# Patient Record
Sex: FEMALE | Race: WHITE | NOT HISPANIC OR LATINO | Employment: UNEMPLOYED | URBAN - METROPOLITAN AREA
[De-identification: names, ages, dates, MRNs, and addresses within clinical notes are randomized per-mention and may not be internally consistent; named-entity substitution may affect disease eponyms.]

---

## 2017-08-31 ENCOUNTER — HOSPITAL ENCOUNTER (OUTPATIENT)
Dept: FAMILY MEDICINE CLINIC | Facility: CLINIC | Age: 47
Setting detail: SPECIMEN
Discharge: HOME | End: 2017-08-31
Attending: FAMILY MEDICINE | Admitting: FAMILY MEDICINE

## 2017-08-31 LAB
ALBUMIN SERPL-MCNC: 3.5 G/DL (ref 3.5–4.8)
ALBUMIN/GLOB SERPL: 1.2 {RATIO} (ref 1–1.7)
ALP SERPL-CCNC: 50 IU/L (ref 32–91)
ALT SERPL-CCNC: 15 IU/L (ref 14–54)
ANION GAP SERPL CALC-SCNC: 17.3 MMOL/L (ref 10–20)
AST SERPL-CCNC: 23 IU/L (ref 15–41)
BASOPHILS # BLD AUTO: 0.1 10*3/UL (ref 0–0.2)
BASOPHILS NFR BLD AUTO: 0 % (ref 0–2)
BILIRUB SERPL-MCNC: 0.2 MG/DL (ref 0.3–1.2)
BUN SERPL-MCNC: 24 MG/DL (ref 8–20)
BUN/CREAT SERPL: 16 (ref 5.4–26.2)
CALCIUM SERPL-MCNC: 9.6 MG/DL (ref 8.9–10.3)
CHLORIDE SERPL-SCNC: 105 MMOL/L (ref 101–111)
CHOLEST SERPL-MCNC: 245 MG/DL
CHOLEST/HDLC SERPL: 6.6 {RATIO}
CONV CO2: 20 MMOL/L (ref 22–32)
CONV LDL CHOLESTEROL DIRECT: 148 MG/DL (ref 0–100)
CONV TOTAL PROTEIN: 6.5 G/DL (ref 6.1–7.9)
CREAT UR-MCNC: 1.5 MG/DL (ref 0.4–1)
DIFFERENTIAL METHOD BLD: (no result)
EOSINOPHIL # BLD AUTO: 0 % (ref 0–3)
EOSINOPHIL # BLD AUTO: 0 10*3/UL (ref 0–0.3)
ERYTHROCYTE [DISTWIDTH] IN BLOOD BY AUTOMATED COUNT: 13 % (ref 11.5–14.5)
GLOBULIN UR ELPH-MCNC: 3 G/DL (ref 2.5–3.8)
GLUCOSE SERPL-MCNC: 154 MG/DL (ref 65–99)
HCT VFR BLD AUTO: 35.6 % (ref 35–49)
HDLC SERPL-MCNC: 37 MG/DL
HGB BLD-MCNC: 11.5 G/DL (ref 12–15)
LDLC/HDLC SERPL: 4 {RATIO}
LIPID INTERPRETATION: ABNORMAL
LYMPHOCYTES # BLD AUTO: 2.4 10*3/UL (ref 0.8–4.8)
LYMPHOCYTES NFR BLD AUTO: 14 % (ref 18–42)
MCH RBC QN AUTO: 27.9 PG (ref 26–32)
MCHC RBC AUTO-ENTMCNC: 32.2 G/DL (ref 32–36)
MCV RBC AUTO: 86.6 FL (ref 80–94)
MONOCYTES # BLD AUTO: 0.9 10*3/UL (ref 0.1–1.3)
MONOCYTES NFR BLD AUTO: 5 % (ref 2–11)
NEUTROPHILS # BLD AUTO: 14.3 10*3/UL (ref 2.3–8.6)
NEUTROPHILS NFR BLD AUTO: 81 % (ref 50–75)
NRBC BLD AUTO-RTO: 0 /100{WBCS}
NRBC/RBC NFR BLD MANUAL: 0 10*3/UL
PLATELET # BLD AUTO: 446 10*3/UL (ref 150–450)
PMV BLD AUTO: 8.1 FL (ref 7.4–10.4)
POTASSIUM SERPL-SCNC: 4.3 MMOL/L (ref 3.6–5.1)
RBC # BLD AUTO: 4.11 10*6/UL (ref 4–5.4)
SODIUM SERPL-SCNC: 138 MMOL/L (ref 136–144)
TRIGL SERPL-MCNC: 439 MG/DL
VLDLC SERPL CALC-MCNC: 59.8 MG/DL
WBC # BLD AUTO: 17.6 10*3/UL (ref 4.5–11.5)

## 2018-01-15 ENCOUNTER — HOSPITAL ENCOUNTER (OUTPATIENT)
Dept: FAMILY MEDICINE CLINIC | Facility: CLINIC | Age: 48
Setting detail: SPECIMEN
Discharge: HOME | End: 2018-01-15
Attending: FAMILY MEDICINE | Admitting: FAMILY MEDICINE

## 2018-01-15 LAB
ALBUMIN SERPL-MCNC: 3.5 G/DL (ref 3.5–4.8)
ALBUMIN/GLOB SERPL: 1.2 {RATIO} (ref 1–1.7)
ALP SERPL-CCNC: 72 IU/L (ref 32–91)
ALT SERPL-CCNC: 18 IU/L (ref 14–54)
ANION GAP SERPL CALC-SCNC: 14.9 MMOL/L (ref 10–20)
AST SERPL-CCNC: 22 IU/L (ref 15–41)
BASOPHILS # BLD AUTO: 0.1 10*3/UL (ref 0–0.2)
BASOPHILS NFR BLD AUTO: 1 % (ref 0–2)
BILIRUB SERPL-MCNC: 0.3 MG/DL (ref 0.3–1.2)
BUN SERPL-MCNC: 17 MG/DL (ref 8–20)
BUN/CREAT SERPL: 13.1 (ref 5.4–26.2)
CALCIUM SERPL-MCNC: 9.4 MG/DL (ref 8.9–10.3)
CHLORIDE SERPL-SCNC: 103 MMOL/L (ref 101–111)
CHOLEST SERPL-MCNC: 143 MG/DL
CHOLEST/HDLC SERPL: 4.2 {RATIO}
CONV CO2: 25 MMOL/L (ref 22–32)
CONV LDL CHOLESTEROL DIRECT: 78 MG/DL (ref 0–100)
CONV TOTAL PROTEIN: 6.4 G/DL (ref 6.1–7.9)
CREAT UR-MCNC: 1.3 MG/DL (ref 0.4–1)
DIFFERENTIAL METHOD BLD: (no result)
EOSINOPHIL # BLD AUTO: 0.3 10*3/UL (ref 0–0.3)
EOSINOPHIL # BLD AUTO: 2 % (ref 0–3)
ERYTHROCYTE [DISTWIDTH] IN BLOOD BY AUTOMATED COUNT: 14.5 % (ref 11.5–14.5)
GLOBULIN UR ELPH-MCNC: 2.9 G/DL (ref 2.5–3.8)
GLUCOSE SERPL-MCNC: 155 MG/DL (ref 65–99)
HCT VFR BLD AUTO: 38.7 % (ref 35–49)
HDLC SERPL-MCNC: 34 MG/DL
HGB BLD-MCNC: 12.1 G/DL (ref 12–15)
LDLC/HDLC SERPL: 2.3 {RATIO}
LIPID INTERPRETATION: ABNORMAL
LYMPHOCYTES # BLD AUTO: 2.7 10*3/UL (ref 0.8–4.8)
LYMPHOCYTES NFR BLD AUTO: 21 % (ref 18–42)
MCH RBC QN AUTO: 25.8 PG (ref 26–32)
MCHC RBC AUTO-ENTMCNC: 31.3 G/DL (ref 32–36)
MCV RBC AUTO: 82.3 FL (ref 80–94)
MONOCYTES # BLD AUTO: 0.6 10*3/UL (ref 0.1–1.3)
MONOCYTES NFR BLD AUTO: 5 % (ref 2–11)
NEUTROPHILS # BLD AUTO: 9.3 10*3/UL (ref 2.3–8.6)
NEUTROPHILS NFR BLD AUTO: 71 % (ref 50–75)
NRBC BLD AUTO-RTO: 0 /100{WBCS}
NRBC/RBC NFR BLD MANUAL: 0 10*3/UL
PLATELET # BLD AUTO: 426 10*3/UL (ref 150–450)
PMV BLD AUTO: 9.1 FL (ref 7.4–10.4)
POTASSIUM SERPL-SCNC: 3.9 MMOL/L (ref 3.6–5.1)
RBC # BLD AUTO: 4.71 10*6/UL (ref 4–5.4)
SODIUM SERPL-SCNC: 139 MMOL/L (ref 136–144)
TRIGL SERPL-MCNC: 232 MG/DL
VLDLC SERPL CALC-MCNC: 31.1 MG/DL
WBC # BLD AUTO: 13 10*3/UL (ref 4.5–11.5)

## 2018-03-08 ENCOUNTER — HOSPITAL ENCOUNTER (OUTPATIENT)
Dept: FAMILY MEDICINE CLINIC | Facility: CLINIC | Age: 48
Setting detail: SPECIMEN
Discharge: HOME | End: 2018-03-08
Attending: FAMILY MEDICINE | Admitting: FAMILY MEDICINE

## 2018-05-02 ENCOUNTER — HOSPITAL ENCOUNTER (OUTPATIENT)
Dept: FAMILY MEDICINE CLINIC | Facility: CLINIC | Age: 48
Setting detail: SPECIMEN
Discharge: HOME | End: 2018-05-02
Attending: FAMILY MEDICINE | Admitting: FAMILY MEDICINE

## 2018-07-20 ENCOUNTER — HOSPITAL ENCOUNTER (OUTPATIENT)
Dept: OTHER | Facility: HOSPITAL | Age: 48
Setting detail: SPECIMEN
Discharge: HOME | End: 2018-07-20
Attending: NURSE PRACTITIONER | Admitting: NURSE PRACTITIONER

## 2018-07-20 LAB
ALBUMIN SERPL-MCNC: 3.4 G/DL (ref 3.5–4.8)
ALBUMIN/GLOB SERPL: 1 {RATIO} (ref 1–1.7)
ALP SERPL-CCNC: 72 IU/L (ref 32–91)
ALT SERPL-CCNC: 19 IU/L (ref 14–54)
ANION GAP SERPL CALC-SCNC: 15.5 MMOL/L (ref 10–20)
AST SERPL-CCNC: 30 IU/L (ref 15–41)
BASOPHILS # BLD AUTO: 0.1 10*3/UL (ref 0–0.2)
BASOPHILS NFR BLD AUTO: 1 % (ref 0–2)
BILIRUB SERPL-MCNC: 0.3 MG/DL (ref 0.3–1.2)
BUN SERPL-MCNC: 15 MG/DL (ref 8–20)
BUN/CREAT SERPL: 13.6 (ref 5.4–26.2)
CALCIUM SERPL-MCNC: 9.1 MG/DL (ref 8.9–10.3)
CHLORIDE SERPL-SCNC: 102 MMOL/L (ref 101–111)
CHOLEST SERPL-MCNC: 138 MG/DL
CHOLEST/HDLC SERPL: 4.8 {RATIO}
CONV CO2: 22 MMOL/L (ref 22–32)
CONV LDL CHOLESTEROL DIRECT: 61 MG/DL (ref 0–100)
CONV TOTAL PROTEIN: 6.7 G/DL (ref 6.1–7.9)
CREAT UR-MCNC: 1.1 MG/DL (ref 0.4–1)
DIFFERENTIAL METHOD BLD: (no result)
EOSINOPHIL # BLD AUTO: 0.2 10*3/UL (ref 0–0.3)
EOSINOPHIL # BLD AUTO: 2 % (ref 0–3)
ERYTHROCYTE [DISTWIDTH] IN BLOOD BY AUTOMATED COUNT: 15.5 % (ref 11.5–14.5)
GLOBULIN UR ELPH-MCNC: 3.3 G/DL (ref 2.5–3.8)
GLUCOSE SERPL-MCNC: 162 MG/DL (ref 65–99)
HCT VFR BLD AUTO: 38.1 % (ref 35–49)
HDLC SERPL-MCNC: 29 MG/DL
HGB BLD-MCNC: 12.5 G/DL (ref 12–15)
IRON SATN MFR SERPL: 6 % (ref 15–50)
IRON SERPL-MCNC: 27 UG/DL (ref 28–170)
LDLC/HDLC SERPL: 2.1 {RATIO}
LIPID INTERPRETATION: ABNORMAL
LYMPHOCYTES # BLD AUTO: 2.9 10*3/UL (ref 0.8–4.8)
LYMPHOCYTES NFR BLD AUTO: 28 % (ref 18–42)
MCH RBC QN AUTO: 25.8 PG (ref 26–32)
MCHC RBC AUTO-ENTMCNC: 32.8 G/DL (ref 32–36)
MCV RBC AUTO: 78.8 FL (ref 80–94)
MONOCYTES # BLD AUTO: 0.5 10*3/UL (ref 0.1–1.3)
MONOCYTES NFR BLD AUTO: 5 % (ref 2–11)
NEUTROPHILS # BLD AUTO: 6.8 10*3/UL (ref 2.3–8.6)
NEUTROPHILS NFR BLD AUTO: 64 % (ref 50–75)
NRBC BLD AUTO-RTO: 0 /100{WBCS}
NRBC/RBC NFR BLD MANUAL: 0 10*3/UL
PLATELET # BLD AUTO: 483 10*3/UL (ref 150–450)
PMV BLD AUTO: 8.6 FL (ref 7.4–10.4)
POTASSIUM SERPL-SCNC: 3.5 MMOL/L (ref 3.6–5.1)
RBC # BLD AUTO: 4.83 10*6/UL (ref 4–5.4)
SODIUM SERPL-SCNC: 136 MMOL/L (ref 136–144)
TIBC SERPL-MCNC: 457 UG/DL (ref 228–428)
TRIGL SERPL-MCNC: 442 MG/DL
VLDLC SERPL CALC-MCNC: 48.1 MG/DL
WBC # BLD AUTO: 10.5 10*3/UL (ref 4.5–11.5)

## 2019-06-19 RX ORDER — METFORMIN HYDROCHLORIDE 500 MG/1
TABLET, EXTENDED RELEASE ORAL
Qty: 60 TABLET | Refills: 0 | Status: SHIPPED | OUTPATIENT
Start: 2019-06-19 | End: 2019-09-07 | Stop reason: SDUPTHER

## 2019-06-19 RX ORDER — HYDROCHLOROTHIAZIDE 25 MG/1
TABLET ORAL
Qty: 30 TABLET | Refills: 1 | Status: SHIPPED | OUTPATIENT
Start: 2019-06-19 | End: 2019-08-23 | Stop reason: SDUPTHER

## 2019-06-20 RX ORDER — HYDRALAZINE HYDROCHLORIDE 25 MG/1
TABLET, FILM COATED ORAL
Qty: 60 TABLET | Refills: 0 | Status: SHIPPED | OUTPATIENT
Start: 2019-06-20 | End: 2019-07-21 | Stop reason: SDUPTHER

## 2019-07-12 RX ORDER — METOPROLOL TARTRATE 50 MG/1
TABLET, FILM COATED ORAL
Qty: 60 TABLET | Refills: 1 | Status: SHIPPED | OUTPATIENT
Start: 2019-07-12 | End: 2019-09-13 | Stop reason: SDUPTHER

## 2019-07-22 RX ORDER — HYDRALAZINE HYDROCHLORIDE 25 MG/1
TABLET, FILM COATED ORAL
Qty: 60 TABLET | Refills: 0 | Status: SHIPPED | OUTPATIENT
Start: 2019-07-22 | End: 2019-08-23 | Stop reason: SDUPTHER

## 2019-08-19 RX ORDER — HYDRALAZINE HYDROCHLORIDE 25 MG/1
TABLET, FILM COATED ORAL
Qty: 60 TABLET | Refills: 0 | OUTPATIENT
Start: 2019-08-19

## 2019-08-19 RX ORDER — HYDROCHLOROTHIAZIDE 25 MG/1
TABLET ORAL
Qty: 30 TABLET | Refills: 0 | OUTPATIENT
Start: 2019-08-19

## 2019-08-23 RX ORDER — HYDRALAZINE HYDROCHLORIDE 25 MG/1
TABLET, FILM COATED ORAL
Qty: 60 TABLET | Refills: 0 | Status: SHIPPED | OUTPATIENT
Start: 2019-08-23 | End: 2019-09-23 | Stop reason: SDUPTHER

## 2019-08-23 RX ORDER — HYDROCHLOROTHIAZIDE 25 MG/1
25 TABLET ORAL DAILY
Qty: 30 TABLET | Refills: 1 | Status: SHIPPED | OUTPATIENT
Start: 2019-08-23 | End: 2019-10-21 | Stop reason: SDUPTHER

## 2019-08-30 RX ORDER — LOSARTAN POTASSIUM 100 MG/1
TABLET ORAL
Qty: 90 TABLET | Refills: 0 | Status: SHIPPED | OUTPATIENT
Start: 2019-08-30 | End: 2019-11-27 | Stop reason: SDUPTHER

## 2019-09-04 ENCOUNTER — OFFICE VISIT (OUTPATIENT)
Dept: FAMILY MEDICINE CLINIC | Facility: CLINIC | Age: 49
End: 2019-09-04

## 2019-09-04 VITALS
WEIGHT: 243 LBS | BODY MASS INDEX: 39.05 KG/M2 | SYSTOLIC BLOOD PRESSURE: 119 MMHG | HEIGHT: 66 IN | HEART RATE: 84 BPM | OXYGEN SATURATION: 96 % | DIASTOLIC BLOOD PRESSURE: 80 MMHG

## 2019-09-04 DIAGNOSIS — E11.9 CONTROLLED TYPE 2 DIABETES MELLITUS WITHOUT COMPLICATION, WITHOUT LONG-TERM CURRENT USE OF INSULIN (HCC): ICD-10-CM

## 2019-09-04 DIAGNOSIS — F31.9 BIPOLAR AFFECTIVE DISORDER, REMISSION STATUS UNSPECIFIED (HCC): ICD-10-CM

## 2019-09-04 DIAGNOSIS — Z00.00 PREVENTATIVE HEALTH CARE: ICD-10-CM

## 2019-09-04 DIAGNOSIS — I10 ESSENTIAL HYPERTENSION: Primary | ICD-10-CM

## 2019-09-04 DIAGNOSIS — Z12.11 SCREENING FOR MALIGNANT NEOPLASM OF COLON: ICD-10-CM

## 2019-09-04 DIAGNOSIS — D50.9 IRON DEFICIENCY ANEMIA, UNSPECIFIED IRON DEFICIENCY ANEMIA TYPE: ICD-10-CM

## 2019-09-04 DIAGNOSIS — E53.8 VITAMIN B12 DEFICIENCY: ICD-10-CM

## 2019-09-04 DIAGNOSIS — N17.9 ACUTE RENAL FAILURE, UNSPECIFIED ACUTE RENAL FAILURE TYPE (HCC): ICD-10-CM

## 2019-09-04 DIAGNOSIS — R53.83 FATIGUE, UNSPECIFIED TYPE: ICD-10-CM

## 2019-09-04 DIAGNOSIS — N92.0 MENORRHAGIA WITH REGULAR CYCLE: ICD-10-CM

## 2019-09-04 PROBLEM — R01.1 HEART MURMUR: Status: ACTIVE | Noted: 2018-07-20

## 2019-09-04 PROBLEM — F41.9 ANXIETY DISORDER: Status: ACTIVE | Noted: 2019-09-04

## 2019-09-04 PROBLEM — N18.30 CHRONIC RENAL INSUFFICIENCY, STAGE III (MODERATE): Status: ACTIVE | Noted: 2018-07-20

## 2019-09-04 PROBLEM — Z30.9 CONTRACEPTION MANAGEMENT: Status: ACTIVE | Noted: 2018-12-05

## 2019-09-04 PROBLEM — R94.31 ABNORMAL EKG: Status: ACTIVE | Noted: 2018-12-05

## 2019-09-04 PROCEDURE — 99213 OFFICE O/P EST LOW 20 MIN: CPT | Performed by: NURSE PRACTITIONER

## 2019-09-04 RX ORDER — CHLORAL HYDRATE 500 MG
CAPSULE ORAL
COMMUNITY
Start: 2018-07-23 | End: 2021-08-17 | Stop reason: SINTOL

## 2019-09-04 RX ORDER — LAMOTRIGINE 100 MG/1
100 TABLET ORAL DAILY
COMMUNITY
Start: 2013-12-15 | End: 2021-05-17 | Stop reason: SDUPTHER

## 2019-09-04 RX ORDER — OMEPRAZOLE 20 MG/1
CAPSULE, DELAYED RELEASE ORAL
COMMUNITY
Start: 2017-08-30 | End: 2020-03-16 | Stop reason: SDUPTHER

## 2019-09-04 RX ORDER — ATORVASTATIN CALCIUM 40 MG/1
TABLET, FILM COATED ORAL EVERY 24 HOURS
COMMUNITY
Start: 2018-04-13 | End: 2020-03-16 | Stop reason: SDUPTHER

## 2019-09-04 RX ORDER — MONTELUKAST SODIUM 10 MG/1
TABLET ORAL EVERY 24 HOURS
COMMUNITY
Start: 2018-01-15 | End: 2019-12-06 | Stop reason: SDUPTHER

## 2019-09-04 RX ORDER — DULOXETIN HYDROCHLORIDE 60 MG/1
1 CAPSULE, DELAYED RELEASE ORAL EVERY 24 HOURS
COMMUNITY
Start: 2019-03-06 | End: 2019-11-27 | Stop reason: SDUPTHER

## 2019-09-04 RX ORDER — HYDROXYZINE HYDROCHLORIDE 25 MG/1
25 TABLET, FILM COATED ORAL DAILY PRN
COMMUNITY
Start: 2019-03-06 | End: 2020-04-16 | Stop reason: SDUPTHER

## 2019-09-04 NOTE — PROGRESS NOTES
Santosh Siegel is a 48 y.o. female.     Patient presents today for follow up. She continues to be without insurance. Patient is treated for hypertension with stable BP. She is treated for diabetes mellitus II and reports blood sugars are in good range.  Patient does have hx of CKD and is avoiding NSAIDs.  She is treated for GERD which is stable. Patient is also treated for hyperlipidemia.  She is followed by psych for bipolar d/o and depression. Patient is requesting referral to OBGYN. 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  She is wishing to have ablation.  She was noted to have low iron and anemia.               The following portions of the patient's history were reviewed and updated as appropriate: allergies, current medications, past family history, past medical history, past social history, past surgical history and problem list.    Review of Systems   Constitutional: Positive for fatigue. Negative for activity change, chills, diaphoresis and fever.   HENT: Negative for congestion, ear pain, facial swelling, mouth sores, rhinorrhea, sinus pressure and sore throat.    Eyes: Negative for blurred vision, double vision, photophobia, pain and visual disturbance.   Respiratory: Negative for cough, choking, chest tightness, shortness of breath, wheezing and stridor.    Cardiovascular: Negative for chest pain, palpitations and leg swelling.   Gastrointestinal: Negative for abdominal distention, abdominal pain, anal bleeding, blood in stool, constipation, diarrhea, nausea, rectal pain, vomiting, GERD and indigestion.   Endocrine: Negative for polydipsia, polyphagia and polyuria.   Genitourinary: Positive for menstrual problem and vaginal bleeding. Negative for difficulty urinating, frequency, hematuria, urgency and urinary incontinence.   Musculoskeletal: Negative for arthralgias, back pain and neck pain.   Skin: Negative for rash  and skin lesions.   Neurological: Negative for dizziness, tremors, weakness, light-headedness and headache.   Psychiatric/Behavioral: Positive for depressed mood and stress. Negative for agitation, behavioral problems and suicidal ideas. The patient is nervous/anxious.        Objective   Physical Exam   Constitutional: She is oriented to person, place, and time. She appears well-developed and well-nourished. No distress.   HENT:   Head: Normocephalic and atraumatic.   Right Ear: External ear normal.   Left Ear: External ear normal.   Nose: Nose normal.   Mouth/Throat: Oropharynx is clear and moist. No oropharyngeal exudate.   Eyes: Conjunctivae and EOM are normal. Pupils are equal, round, and reactive to light. Right eye exhibits no discharge. Left eye exhibits no discharge. No scleral icterus.   Neck: Normal range of motion. Neck supple. No JVD present. Carotid bruit is not present. No tracheal deviation present. No thyromegaly present.   Cardiovascular: Normal rate, regular rhythm, normal heart sounds and intact distal pulses. Exam reveals no gallop and no friction rub.   No murmur heard.  Pulmonary/Chest: Breath sounds normal. No stridor. No respiratory distress. She has no wheezes. She has no rales. She exhibits no tenderness.   Abdominal: Soft. Bowel sounds are normal. She exhibits no distension. There is no tenderness.   Musculoskeletal: Normal range of motion. She exhibits no edema, tenderness or deformity.   Lymphadenopathy:     She has no cervical adenopathy.   Neurological: She is alert and oriented to person, place, and time. No sensory deficit. She exhibits normal muscle tone. Coordination normal.   Skin: Skin is warm and dry. Capillary refill takes less than 2 seconds. No rash noted. She is not diaphoretic.   Psychiatric: She has a normal mood and affect. Her behavior is normal. Judgment and thought content normal.         Assessment/Plan   Rosita was seen today for hypertension.    Diagnoses and all  orders for this visit:    Essential hypertension  -     Comprehensive Metabolic Panel; Future  - Stable    Iron deficiency anemia, unspecified iron deficiency anemia type  -     Iron Profile; Future  -     CBC & Differential; Future  - Likely secondary to heavy menses but I do recommend Colonoscopy and EGD.  She thinks she will get insurance soon so we will go ahead and order colonoscopy. Referral to OBGYN for ablation.    Controlled type 2 diabetes mellitus without complication, without long-term current use of insulin (CMS/[facility])  -     Lipid Panel; Future  -     Hemoglobin A1c; Future    Menorrhagia with regular cycle  -     Ambulatory Referral to Obstetrics / Gynecology    Fatigue, unspecified type  -     TSH; Future    Vitamin B12 deficiency  -     Vitamin B12; Future    Screening for malignant neoplasm of colon  -     Ambulatory Referral For Screening Colonoscopy    Acute renal failure, unspecified acute renal failure type (CMS/HCC)   -  Repeat kidney function  Bipolar affective disorder, remission status unspecified (CMS/HCC)   -  Followed by psych.  Preventative health care   -  Patient due for mammogram, she will wait for insurance. She expects it to be active this year.    -  Recommend annual flu shot.

## 2019-09-09 RX ORDER — METFORMIN HYDROCHLORIDE 500 MG/1
TABLET, EXTENDED RELEASE ORAL
Qty: 60 TABLET | Refills: 0 | Status: SHIPPED | OUTPATIENT
Start: 2019-09-09 | End: 2019-10-08 | Stop reason: SDUPTHER

## 2019-09-13 RX ORDER — METOPROLOL TARTRATE 50 MG/1
TABLET, FILM COATED ORAL
Qty: 60 TABLET | Refills: 0 | Status: SHIPPED | OUTPATIENT
Start: 2019-09-13 | End: 2019-10-12 | Stop reason: SDUPTHER

## 2019-09-23 RX ORDER — HYDRALAZINE HYDROCHLORIDE 25 MG/1
25 TABLET, FILM COATED ORAL 2 TIMES DAILY
Qty: 60 TABLET | Refills: 0 | Status: SHIPPED | OUTPATIENT
Start: 2019-09-23 | End: 2019-10-22 | Stop reason: SDUPTHER

## 2019-10-08 RX ORDER — METFORMIN HYDROCHLORIDE 500 MG/1
1000 TABLET, EXTENDED RELEASE ORAL EVERY MORNING
Qty: 60 TABLET | Refills: 0 | Status: SHIPPED | OUTPATIENT
Start: 2019-10-08 | End: 2019-11-06 | Stop reason: SDUPTHER

## 2019-10-14 RX ORDER — METOPROLOL TARTRATE 50 MG/1
TABLET, FILM COATED ORAL
Qty: 60 TABLET | Refills: 0 | Status: SHIPPED | OUTPATIENT
Start: 2019-10-14 | End: 2019-11-14 | Stop reason: SDUPTHER

## 2019-10-21 RX ORDER — HYDROCHLOROTHIAZIDE 25 MG/1
25 TABLET ORAL DAILY
Qty: 30 TABLET | Refills: 1 | Status: SHIPPED | OUTPATIENT
Start: 2019-10-21 | End: 2020-01-17 | Stop reason: SDUPTHER

## 2019-10-22 RX ORDER — HYDRALAZINE HYDROCHLORIDE 25 MG/1
TABLET, FILM COATED ORAL
Qty: 60 TABLET | Refills: 0 | Status: SHIPPED | OUTPATIENT
Start: 2019-10-22 | End: 2019-11-21 | Stop reason: SDUPTHER

## 2019-10-24 ENCOUNTER — TELEPHONE (OUTPATIENT)
Dept: FAMILY MEDICINE CLINIC | Facility: CLINIC | Age: 49
End: 2019-10-24

## 2019-10-24 RX ORDER — FLUCONAZOLE 150 MG/1
150 TABLET ORAL ONCE
Qty: 1 TABLET | Refills: 0 | Status: SHIPPED | OUTPATIENT
Start: 2019-10-24 | End: 2021-04-26 | Stop reason: SDUPTHER

## 2019-11-06 RX ORDER — METFORMIN HYDROCHLORIDE 500 MG/1
TABLET, EXTENDED RELEASE ORAL
Qty: 60 TABLET | Refills: 0 | Status: SHIPPED | OUTPATIENT
Start: 2019-11-06 | End: 2019-12-09 | Stop reason: SDUPTHER

## 2019-11-15 RX ORDER — METOPROLOL TARTRATE 50 MG/1
TABLET, FILM COATED ORAL
Qty: 60 TABLET | Refills: 2 | Status: SHIPPED | OUTPATIENT
Start: 2019-11-15 | End: 2019-12-16 | Stop reason: SDUPTHER

## 2019-11-21 RX ORDER — HYDRALAZINE HYDROCHLORIDE 25 MG/1
TABLET, FILM COATED ORAL
Qty: 60 TABLET | Refills: 1 | Status: SHIPPED | OUTPATIENT
Start: 2019-11-21 | End: 2020-02-07

## 2019-11-27 RX ORDER — LOSARTAN POTASSIUM 100 MG/1
100 TABLET ORAL DAILY
Qty: 90 TABLET | Refills: 0 | Status: SHIPPED | OUTPATIENT
Start: 2019-11-27 | End: 2019-12-02 | Stop reason: SDUPTHER

## 2019-11-27 RX ORDER — DULOXETIN HYDROCHLORIDE 60 MG/1
CAPSULE, DELAYED RELEASE ORAL
Qty: 90 CAPSULE | Refills: 0 | Status: SHIPPED | OUTPATIENT
Start: 2019-11-27 | End: 2020-03-02 | Stop reason: SDUPTHER

## 2019-12-02 RX ORDER — LOSARTAN POTASSIUM 100 MG/1
100 TABLET ORAL DAILY
Qty: 90 TABLET | Refills: 0 | Status: SHIPPED | OUTPATIENT
Start: 2019-12-02 | End: 2019-12-16 | Stop reason: SDUPTHER

## 2019-12-06 RX ORDER — MONTELUKAST SODIUM 10 MG/1
10 TABLET ORAL EVERY 24 HOURS
Qty: 90 TABLET | Refills: 0 | Status: SHIPPED | OUTPATIENT
Start: 2019-12-06 | End: 2020-03-12

## 2019-12-09 RX ORDER — METFORMIN HYDROCHLORIDE 500 MG/1
1000 TABLET, EXTENDED RELEASE ORAL EVERY MORNING
Qty: 60 TABLET | Refills: 2 | Status: SHIPPED | OUTPATIENT
Start: 2019-12-09 | End: 2019-12-09 | Stop reason: SDUPTHER

## 2019-12-09 RX ORDER — METFORMIN HYDROCHLORIDE 500 MG/1
1000 TABLET, EXTENDED RELEASE ORAL EVERY MORNING
Qty: 180 TABLET | Refills: 0 | Status: SHIPPED | OUTPATIENT
Start: 2019-12-09 | End: 2019-12-16 | Stop reason: SDUPTHER

## 2019-12-16 RX ORDER — METFORMIN HYDROCHLORIDE 500 MG/1
1000 TABLET, EXTENDED RELEASE ORAL EVERY MORNING
Qty: 180 TABLET | Refills: 0 | Status: SHIPPED | OUTPATIENT
Start: 2019-12-16 | End: 2020-03-12

## 2019-12-16 RX ORDER — LOSARTAN POTASSIUM 100 MG/1
100 TABLET ORAL DAILY
Qty: 90 TABLET | Refills: 0 | Status: SHIPPED | OUTPATIENT
Start: 2019-12-16 | End: 2020-03-12

## 2019-12-16 RX ORDER — METOPROLOL TARTRATE 50 MG/1
50 TABLET, FILM COATED ORAL 2 TIMES DAILY
Qty: 60 TABLET | Refills: 2 | Status: SHIPPED | OUTPATIENT
Start: 2019-12-16 | End: 2020-03-16 | Stop reason: SDUPTHER

## 2020-01-17 RX ORDER — HYDROCHLOROTHIAZIDE 25 MG/1
25 TABLET ORAL DAILY
Qty: 30 TABLET | Refills: 1 | Status: SHIPPED | OUTPATIENT
Start: 2020-01-17 | End: 2020-03-16

## 2020-02-07 RX ORDER — HYDRALAZINE HYDROCHLORIDE 25 MG/1
TABLET, FILM COATED ORAL
Qty: 60 TABLET | Refills: 0 | Status: SHIPPED | OUTPATIENT
Start: 2020-02-07 | End: 2020-03-09

## 2020-03-02 RX ORDER — DULOXETIN HYDROCHLORIDE 60 MG/1
60 CAPSULE, DELAYED RELEASE ORAL DAILY
Qty: 90 CAPSULE | Refills: 0 | Status: SHIPPED | OUTPATIENT
Start: 2020-03-02 | End: 2020-03-03 | Stop reason: SDUPTHER

## 2020-03-03 RX ORDER — DULOXETIN HYDROCHLORIDE 60 MG/1
60 CAPSULE, DELAYED RELEASE ORAL DAILY
Qty: 90 CAPSULE | Refills: 0 | Status: SHIPPED | OUTPATIENT
Start: 2020-03-03 | End: 2020-03-09 | Stop reason: SDUPTHER

## 2020-03-09 ENCOUNTER — TELEPHONE (OUTPATIENT)
Dept: FAMILY MEDICINE CLINIC | Facility: CLINIC | Age: 50
End: 2020-03-09

## 2020-03-09 RX ORDER — HYDRALAZINE HYDROCHLORIDE 25 MG/1
TABLET, FILM COATED ORAL
Qty: 60 TABLET | Refills: 0 | Status: SHIPPED | OUTPATIENT
Start: 2020-03-09 | End: 2020-03-16 | Stop reason: SDUPTHER

## 2020-03-09 RX ORDER — DULOXETIN HYDROCHLORIDE 60 MG/1
60 CAPSULE, DELAYED RELEASE ORAL DAILY
Qty: 90 CAPSULE | Refills: 0 | Status: SHIPPED | OUTPATIENT
Start: 2020-03-09 | End: 2020-03-11 | Stop reason: SDUPTHER

## 2020-03-09 NOTE — TELEPHONE ENCOUNTER
Needs Zimbolta refilled until she can get in next week.  Said pharmacy had been trying to contact us. bm

## 2020-03-11 RX ORDER — DULOXETIN HYDROCHLORIDE 60 MG/1
60 CAPSULE, DELAYED RELEASE ORAL DAILY
Qty: 90 CAPSULE | Refills: 0 | Status: SHIPPED | OUTPATIENT
Start: 2020-03-11 | End: 2020-03-16

## 2020-03-12 RX ORDER — METFORMIN HYDROCHLORIDE 500 MG/1
TABLET, EXTENDED RELEASE ORAL
Qty: 180 TABLET | Refills: 0 | Status: SHIPPED | OUTPATIENT
Start: 2020-03-12 | End: 2020-06-15 | Stop reason: SDUPTHER

## 2020-03-12 RX ORDER — LOSARTAN POTASSIUM 100 MG/1
100 TABLET ORAL DAILY
Qty: 90 TABLET | Refills: 0 | Status: SHIPPED | OUTPATIENT
Start: 2020-03-12 | End: 2020-06-16

## 2020-03-12 RX ORDER — MONTELUKAST SODIUM 10 MG/1
TABLET ORAL
Qty: 90 TABLET | Refills: 0 | Status: SHIPPED | OUTPATIENT
Start: 2020-03-12 | End: 2020-03-16

## 2020-03-16 ENCOUNTER — OFFICE VISIT (OUTPATIENT)
Dept: FAMILY MEDICINE CLINIC | Facility: CLINIC | Age: 50
End: 2020-03-16

## 2020-03-16 VITALS
WEIGHT: 234.6 LBS | BODY MASS INDEX: 36.82 KG/M2 | DIASTOLIC BLOOD PRESSURE: 90 MMHG | HEIGHT: 67 IN | SYSTOLIC BLOOD PRESSURE: 129 MMHG | OXYGEN SATURATION: 95 % | HEART RATE: 104 BPM

## 2020-03-16 DIAGNOSIS — F41.9 ANXIETY DISORDER, UNSPECIFIED TYPE: ICD-10-CM

## 2020-03-16 DIAGNOSIS — E11.9 CONTROLLED TYPE 2 DIABETES MELLITUS WITHOUT COMPLICATION, WITHOUT LONG-TERM CURRENT USE OF INSULIN (HCC): Primary | ICD-10-CM

## 2020-03-16 DIAGNOSIS — I10 ESSENTIAL HYPERTENSION: ICD-10-CM

## 2020-03-16 DIAGNOSIS — J30.89 SEASONAL ALLERGIC RHINITIS DUE TO OTHER ALLERGIC TRIGGER: ICD-10-CM

## 2020-03-16 DIAGNOSIS — M26.649 TMJ ARTHRITIS: ICD-10-CM

## 2020-03-16 PROBLEM — F32.A DEPRESSION: Status: ACTIVE | Noted: 2020-03-16

## 2020-03-16 PROBLEM — J30.9 ALLERGIC RHINITIS DUE TO ALLERGEN: Status: ACTIVE | Noted: 2020-03-16

## 2020-03-16 PROCEDURE — 99212 OFFICE O/P EST SF 10 MIN: CPT | Performed by: NURSE PRACTITIONER

## 2020-03-16 RX ORDER — DESVENLAFAXINE 25 MG/1
25 TABLET, EXTENDED RELEASE ORAL DAILY
COMMUNITY
End: 2020-10-08 | Stop reason: SDUPTHER

## 2020-03-16 RX ORDER — HYDRALAZINE HYDROCHLORIDE 25 MG/1
25 TABLET, FILM COATED ORAL 2 TIMES DAILY
Qty: 180 TABLET | Refills: 1 | Status: SHIPPED | OUTPATIENT
Start: 2020-03-16 | End: 2020-10-08 | Stop reason: SDUPTHER

## 2020-03-16 RX ORDER — METOPROLOL TARTRATE 50 MG/1
50 TABLET, FILM COATED ORAL 2 TIMES DAILY
Qty: 180 TABLET | Refills: 1 | Status: SHIPPED | OUTPATIENT
Start: 2020-03-16 | End: 2020-10-08 | Stop reason: SDUPTHER

## 2020-03-16 RX ORDER — HYDROCHLOROTHIAZIDE 25 MG/1
TABLET ORAL
Qty: 30 TABLET | Refills: 0 | Status: SHIPPED | OUTPATIENT
Start: 2020-03-16 | End: 2020-03-16 | Stop reason: SDUPTHER

## 2020-03-16 RX ORDER — HYDROCHLOROTHIAZIDE 25 MG/1
25 TABLET ORAL DAILY
Qty: 90 TABLET | Refills: 1 | Status: SHIPPED | OUTPATIENT
Start: 2020-03-16 | End: 2020-10-08 | Stop reason: SDUPTHER

## 2020-03-16 RX ORDER — OMEPRAZOLE 20 MG/1
20 CAPSULE, DELAYED RELEASE ORAL DAILY
Qty: 90 CAPSULE | Refills: 1 | Status: SHIPPED | OUTPATIENT
Start: 2020-03-16 | End: 2020-09-15

## 2020-03-16 RX ORDER — ATORVASTATIN CALCIUM 40 MG/1
40 TABLET, FILM COATED ORAL NIGHTLY
Qty: 90 TABLET | Refills: 1 | Status: SHIPPED | OUTPATIENT
Start: 2020-03-16 | End: 2020-09-15

## 2020-03-16 NOTE — PROGRESS NOTES
Chief Complaint   Patient presents with   • Establish Care       HPI     HTN, stable on meds and takes as directed, denies chest pain, headache, shortness of air, palpitations and swelling of extremities.     Hyperlipidemia, The patient denies muscle aches, constipation, diarrhea, GI upset and fatigue.  Patient denies  chest pain/pressure, exercise intolerance, dyspnea, palpitations, syncope and pedal edema.      Diabetes mellitus type II, feels stable on meds, denies any signs/symptoms of hyper/hypoglycemia, blurry vision, polydipsia, polyuria, nocturia, and unintentional weight loss. BG <150 and checks 1-2 times/week.  Patient does not have insurance currently and last A1c in 2018 6.8    Depression/Anxiety, pt on multi meds, follows with Carmina Arora, psych. she reports she had a mental breakdown, quit her job, stopped duloxetine, now on pristiq. patient denies significant weight loss/gain, insomnia, hypersomnia, psychomotor agitation, psychomotor retardation, fatigue (loss of energy), feelings of worthlessness (guilt), impaired concentration (indecisiveness), thoughts of death or suicide.       Allergic rhinitis, stopped singulair d/t strong potential for depression exacerbation.     Past Medical History:   Diagnosis Date   • Allergies    • Depression    • Diabetes mellitus (CMS/[facility])    • GERD (gastroesophageal reflux disease)    • Hyperlipidemia    • Hypertension    • Pneumonia      Past Surgical History:   Procedure Laterality Date   • COLONOSCOPY       Family History   Problem Relation Age of Onset   • Heart failure Mother    • Cancer Father    • Heart failure Father    • Ovarian cancer Maternal Grandmother      Social History     Tobacco Use   • Smoking status: Never Smoker   • Smokeless tobacco: Never Used   Substance Use Topics   • Alcohol use: No     Frequency: Never         Current Outpatient Medications:   •  albuterol sulfate  (90 Base) MCG/ACT inhaler, Inhale 2 puffs Every 4 (Four) Hours As  Needed for Wheezing or Shortness of Air., Disp: 1 inhaler, Rfl: 0  •  Ascorbic Acid (VITAMIN C PO), Take  by mouth., Disp: , Rfl:   •  atorvastatin (LIPITOR) 40 MG tablet, Take 1 tablet by mouth Every Night., Disp: 90 tablet, Rfl: 1  •  Cetirizine HCl (ZYRTEC ALLERGY) 10 MG capsule, ZYRTEC ALLERGY 10 MG CAPS, Disp: , Rfl:   •  Desvenlafaxine Succinate ER (PRISTIQ) 25 MG tablet sustained-release 24 hour, Take 25 mg by mouth Daily., Disp: , Rfl:   •  hydrALAZINE (APRESOLINE) 25 MG tablet, Take 1 tablet by mouth 2 (Two) Times a Day., Disp: 180 tablet, Rfl: 1  •  hydroCHLOROthiazide (HYDRODIURIL) 25 MG tablet, Take 1 tablet by mouth Daily., Disp: 90 tablet, Rfl: 1  •  hydrOXYzine (ATARAX) 25 MG tablet, Take 25 mg by mouth Daily As Needed for Anxiety., Disp: , Rfl:   •  lamoTRIgine (LAMICTAL) 100 MG tablet, Take 100 mg by mouth Daily., Disp: , Rfl:   •  losartan (COZAAR) 100 MG tablet, Take 1 tablet by mouth Daily., Disp: 90 tablet, Rfl: 0  •  MAGNESIUM PO, Take  by mouth., Disp: , Rfl:   •  metFORMIN ER (GLUCOPHAGE-XR) 500 MG 24 hr tablet, TAKE TWO TABLETS BY MOUTH EVERY MORNING, Disp: 180 tablet, Rfl: 0  •  metoprolol tartrate (LOPRESSOR) 50 MG tablet, Take 1 tablet by mouth 2 (Two) Times a Day., Disp: 180 tablet, Rfl: 1  •  Omega-3 Fatty Acids (FISH OIL) 1000 MG capsule capsule, FISH OIL 1000 MG CAPS, Disp: , Rfl:   •  omeprazole (PrilOSEC) 20 MG capsule, Take 1 capsule by mouth Daily., Disp: 90 capsule, Rfl: 1  •  POTASSIUM PO, Take  by mouth., Disp: , Rfl:         The following portions of the patient's history were reviewed and updated as appropriate: allergies, current medications, past family history, past medical history, past social history, past surgical history and problem list.    Review of Systems   Constitutional: Negative for chills, fatigue and fever.   HENT: Positive for ear pain ( Right ear pain and jaw, frequently grinds her teeth). Negative for dental problem, sinus pressure and sore throat.    Eyes:  "Negative for visual disturbance.   Respiratory: Negative for cough, shortness of breath and wheezing.    Gastrointestinal: Negative for abdominal pain, blood in stool, constipation, diarrhea, nausea, vomiting and GERD.   Genitourinary: Negative for difficulty urinating, frequency, urgency and urinary incontinence.   Musculoskeletal: Negative for arthralgias, back pain, gait problem, joint swelling, myalgias and neck pain.   Skin: Negative for dry skin, pallor and rash.   Neurological: Negative for dizziness, seizures, speech difficulty and weakness.   Hematological: Negative for adenopathy.   Psychiatric/Behavioral: Positive for sleep disturbance, depressed mood and stress. The patient is nervous/anxious.         Vitals:    03/16/20 1305   BP: 129/90   BP Location: Left arm   Patient Position: Sitting   Cuff Size: Large Adult   Pulse: 104   SpO2: 95%   Weight: 106 kg (234 lb 9.6 oz)   Height: 170.2 cm (67.01\")     Body mass index is 36.73 kg/m².     Physical Exam   Constitutional: She is oriented to person, place, and time. She appears well-developed and well-nourished. No distress.   HENT:   Normal TMs, OP erythema with cobblestoning present.  Crepitus at right TM joint opening of the jaw   Eyes: Pupils are equal, round, and reactive to light.   Neck: Normal range of motion. Neck supple. No JVD present. No thyromegaly present.   Cardiovascular: Normal rate, regular rhythm, normal heart sounds and intact distal pulses.   No murmur heard.  Pulmonary/Chest: Effort normal and breath sounds normal. No respiratory distress.   Abdominal: Soft. Bowel sounds are normal. She exhibits no distension. There is no tenderness.   Musculoskeletal: Normal range of motion. She exhibits no tenderness.   Neurological: She is alert and oriented to person, place, and time. No sensory deficit.   Skin: Skin is warm and dry. She is not diaphoretic.   Psychiatric: Her behavior is normal. Judgment and thought content normal. Her mood appears " anxious. She exhibits a depressed mood.   Nursing note and vitals reviewed.       No visits with results within 7 Day(s) from this visit.   Latest known visit with results is:   No results found for any previous visit.         Diagnoses and all orders for this visit:    1. Controlled type 2 diabetes mellitus without complication, without long-term current use of insulin (CMS/[facility]) (Primary)  -     Basic Metabolic Panel; Future  -     Hemoglobin A1c; Future  -     CBC (No Diff); Future  Patient checks blood glucose 1-2 times weekly at home and less than 150.  Continue metformin XR 1000 mg daily.  Patient given order for BMP CBC and hemoglobin A1c to have done at [facility] for cash price    2. Seasonal allergic rhinitis due to other allergic trigger  Stopped Singulair due to potential for depression exacerbation.  Recommend patient to get Xyzal and Flonase over-the-counter    3. TMJ arthritis  Okay for intermittent ibuprofen as needed, limit chewy/hard candy and foods  Teeth should not touch unless chewing    4. Anxiety disorder, unspecified type  Keep follow-ups with Carmina Kohler, continue medications    5. Essential hypertension  BP stable on medications, refill and plan to return to clinic in 6 months or earlier as needed    Other orders  -     hydroCHLOROthiazide (HYDRODIURIL) 25 MG tablet; Take 1 tablet by mouth Daily.  Dispense: 90 tablet; Refill: 1  -     metoprolol tartrate (LOPRESSOR) 50 MG tablet; Take 1 tablet by mouth 2 (Two) Times a Day.  Dispense: 180 tablet; Refill: 1  -     omeprazole (PrilOSEC) 20 MG capsule; Take 1 capsule by mouth Daily.  Dispense: 90 capsule; Refill: 1  -     hydrALAZINE (APRESOLINE) 25 MG tablet; Take 1 tablet by mouth 2 (Two) Times a Day.  Dispense: 180 tablet; Refill: 1  -     atorvastatin (LIPITOR) 40 MG tablet; Take 1 tablet by mouth Every Night.  Dispense: 90 tablet; Refill: 1       Unable to obtain mammogram due to no insurance at this time, will re-evaluate in the  future  Return to office in 6 months or earlier as needed

## 2020-03-17 ENCOUNTER — RESULTS ENCOUNTER (OUTPATIENT)
Dept: FAMILY MEDICINE CLINIC | Facility: CLINIC | Age: 50
End: 2020-03-17

## 2020-03-17 DIAGNOSIS — E11.9 CONTROLLED TYPE 2 DIABETES MELLITUS WITHOUT COMPLICATION, WITHOUT LONG-TERM CURRENT USE OF INSULIN (HCC): ICD-10-CM

## 2020-04-16 RX ORDER — HYDROXYZINE HYDROCHLORIDE 25 MG/1
25 TABLET, FILM COATED ORAL DAILY PRN
Qty: 90 TABLET | Refills: 0 | Status: SHIPPED | OUTPATIENT
Start: 2020-04-16 | End: 2020-06-01

## 2020-06-01 RX ORDER — HYDROXYZINE HYDROCHLORIDE 25 MG/1
TABLET, FILM COATED ORAL
Qty: 90 TABLET | Refills: 2 | Status: SHIPPED | OUTPATIENT
Start: 2020-06-01 | End: 2021-04-29 | Stop reason: SDUPTHER

## 2020-06-15 ENCOUNTER — OFFICE VISIT (OUTPATIENT)
Dept: FAMILY MEDICINE CLINIC | Facility: CLINIC | Age: 50
End: 2020-06-15

## 2020-06-15 VITALS
HEIGHT: 67 IN | HEART RATE: 68 BPM | TEMPERATURE: 97.3 F | BODY MASS INDEX: 34.75 KG/M2 | DIASTOLIC BLOOD PRESSURE: 69 MMHG | SYSTOLIC BLOOD PRESSURE: 100 MMHG | WEIGHT: 221.4 LBS | OXYGEN SATURATION: 97 %

## 2020-06-15 DIAGNOSIS — I10 ESSENTIAL HYPERTENSION: ICD-10-CM

## 2020-06-15 DIAGNOSIS — E78.2 MIXED HYPERLIPIDEMIA: ICD-10-CM

## 2020-06-15 DIAGNOSIS — R53.83 FATIGUE, UNSPECIFIED TYPE: ICD-10-CM

## 2020-06-15 DIAGNOSIS — E11.65 CONTROLLED TYPE 2 DIABETES MELLITUS WITH HYPERGLYCEMIA, WITHOUT LONG-TERM CURRENT USE OF INSULIN (HCC): Primary | ICD-10-CM

## 2020-06-15 DIAGNOSIS — B37.31 CANDIDA VAGINITIS: ICD-10-CM

## 2020-06-15 PROCEDURE — 99212 OFFICE O/P EST SF 10 MIN: CPT | Performed by: NURSE PRACTITIONER

## 2020-06-15 RX ORDER — FLUCONAZOLE 150 MG/1
150 TABLET ORAL ONCE
Qty: 1 TABLET | Refills: 1 | Status: SHIPPED | OUTPATIENT
Start: 2020-06-15 | End: 2020-06-15

## 2020-06-15 RX ORDER — METFORMIN HYDROCHLORIDE 500 MG/1
1000 TABLET, EXTENDED RELEASE ORAL 2 TIMES DAILY
Qty: 360 TABLET | Refills: 1 | Status: SHIPPED | OUTPATIENT
Start: 2020-06-15 | End: 2020-08-06

## 2020-06-15 NOTE — PROGRESS NOTES
"Chief Complaint   Patient presents with   • Diabetes     pt says she is having issues with regulating BG   • Depression       HPI     Diabetes mellitus type II, on metformin mg XR BID, pt reports poor diet lately with increased depression, reports symptoms of hypoglycemia, including dizziness, fatigue, blurry vision, polydipsia, polyuria, she reports she checks her blood glucose and typically runs 110-130, however in the office glucose was checked today and unable to register glucometer reported \"hi\" she is self-pay, will not have insurance for another 80 days and has not been able to get cash pay lab work done at [facility] yet.  She denies nocturia, and unintentional weight loss.     HTN, patient on 50 mg twice daily of metoprolol, BP running low 100s systolic, patient reports severe fatigue, denies chest pain, headache, shortness of air, palpitations and swelling of extremities.     Hyperlipidemia, The patient denies muscle aches, constipation, diarrhea, GI upset, she does report severe fatigue, denies chest pain/pressure, exercise intolerance, dyspnea, palpitations, syncope and pedal edema.      Depression/anxiety, patient denies significant weight loss/gain, insomnia, hypersomnia, psychomotor agitation, psychomotor retardation, fatigue (loss of energy), feelings of worthlessness (guilt), impaired concentration (indecisiveness), thoughts of death or suicide.            The following portions of the patient's history were reviewed and updated as appropriate: allergies, current medications, past family history, past medical history, past social history, past surgical history and problem list.    Past Medical History:   Diagnosis Date   • Allergies    • Depression    • Diabetes mellitus (CMS/[facility])    • GERD (gastroesophageal reflux disease)    • Hyperlipidemia    • Hypertension    • Pneumonia      Past Surgical History:   Procedure Laterality Date   • COLONOSCOPY       Family History   Problem Relation Age of Onset   • " Heart failure Mother    • Cancer Father    • Heart failure Father    • Ovarian cancer Maternal Grandmother      Social History     Tobacco Use   • Smoking status: Never Smoker   • Smokeless tobacco: Never Used   Substance Use Topics   • Alcohol use: No     Frequency: Never         Current Outpatient Medications:   •  albuterol sulfate  (90 Base) MCG/ACT inhaler, Inhale 2 puffs Every 4 (Four) Hours As Needed for Wheezing or Shortness of Air., Disp: 1 inhaler, Rfl: 0  •  Ascorbic Acid (VITAMIN C PO), Take  by mouth., Disp: , Rfl:   •  atorvastatin (LIPITOR) 40 MG tablet, Take 1 tablet by mouth Every Night., Disp: 90 tablet, Rfl: 1  •  Cetirizine HCl (ZYRTEC ALLERGY) 10 MG capsule, ZYRTEC ALLERGY 10 MG CAPS, Disp: , Rfl:   •  Desvenlafaxine Succinate ER (PRISTIQ) 25 MG tablet sustained-release 24 hour, Take 25 mg by mouth Daily., Disp: , Rfl:   •  hydrALAZINE (APRESOLINE) 25 MG tablet, Take 1 tablet by mouth 2 (Two) Times a Day., Disp: 180 tablet, Rfl: 1  •  hydroCHLOROthiazide (HYDRODIURIL) 25 MG tablet, Take 1 tablet by mouth Daily., Disp: 90 tablet, Rfl: 1  •  hydrOXYzine (ATARAX) 25 MG tablet, TAKE ONE TABLET BY MOUTH DAILY AS NEEDED FOR ANXIETY, Disp: 90 tablet, Rfl: 2  •  lamoTRIgine (LAMICTAL) 100 MG tablet, Take 100 mg by mouth Daily., Disp: , Rfl:   •  losartan (COZAAR) 100 MG tablet, Take 1 tablet by mouth Daily., Disp: 90 tablet, Rfl: 0  •  MAGNESIUM PO, Take  by mouth., Disp: , Rfl:   •  metFORMIN ER (GLUCOPHAGE-XR) 500 MG 24 hr tablet, Take 2 tablets by mouth 2 (Two) Times a Day., Disp: 360 tablet, Rfl: 1  •  metoprolol tartrate (LOPRESSOR) 50 MG tablet, Take 1 tablet by mouth 2 (Two) Times a Day., Disp: 180 tablet, Rfl: 1  •  Omega-3 Fatty Acids (FISH OIL) 1000 MG capsule capsule, FISH OIL 1000 MG CAPS, Disp: , Rfl:   •  omeprazole (PrilOSEC) 20 MG capsule, Take 1 capsule by mouth Daily., Disp: 90 capsule, Rfl: 1  •  POTASSIUM PO, Take  by mouth., Disp: , Rfl:   •  fluconazole (Diflucan) 150 MG  "tablet, Take 1 tablet by mouth 1 (One) Time for 1 dose. May repeat in 72 hours prn, Disp: 1 tablet, Rfl: 1      Review of Systems       A full 12 point review of systems has been obtained as mentioned in HPI, otherwise negative      Vitals:    06/15/20 1209   BP: 100/69   BP Location: Left arm   Patient Position: Sitting   Cuff Size: Large Adult   Pulse: 68   Temp: 97.3 °F (36.3 °C)   TempSrc: Skin   SpO2: 97%   Weight: 100 kg (221 lb 6.4 oz)   Height: 170.2 cm (67.01\")     Body mass index is 34.67 kg/m².    Physical Exam   Constitutional: She is oriented to person, place, and time. She appears well-developed and well-nourished. No distress.   Eyes: Pupils are equal, round, and reactive to light.   Neck: Normal range of motion. Neck supple. No JVD present. No thyromegaly present.   Cardiovascular: Normal rate, regular rhythm, normal heart sounds and intact distal pulses.   No murmur heard.  Pulmonary/Chest: Effort normal and breath sounds normal. No respiratory distress.   Abdominal: Soft. Bowel sounds are normal. She exhibits no distension. There is no tenderness.   Musculoskeletal: Normal range of motion. She exhibits no tenderness.   Neurological: She is alert and oriented to person, place, and time. No sensory deficit.   Skin: Skin is warm and dry. She is not diaphoretic.   Psychiatric: Her speech is normal and behavior is normal. Judgment and thought content normal. Her mood appears anxious. She exhibits a depressed mood.   Nursing note and vitals reviewed.      No visits with results within 7 Day(s) from this visit.   Latest known visit with results is:   No results found for any previous visit.       Diagnoses and all orders for this visit:    1. Controlled type 2 diabetes mellitus with hyperglycemia, without long-term current use of insulin (CMS/[facility]) (Primary)  -     metFORMIN ER (GLUCOPHAGE-XR) 500 MG 24 hr tablet; Take 2 tablets by mouth 2 (Two) Times a Day.  Dispense: 360 tablet; Refill: 1  -Patient " "instructed to take orders to [facility] ASAP, need to see hemoglobin A1c -glucose is likely very high  -Glucometer reading \"high\" in office today  -Metformin XR increased to 1000 mg twice daily  -We will add other medications if needed pending hemoglobin A1c  -Patient also instructed to make an eye appointment    2. Fatigue, unspecified type  -     TSH; Future    3. Essential hypertension  -Patient instructed to cut metoprolol back to 25 mg twice daily  -Continue hydrochlorothiazide and losartan    4. Mixed hyperlipidemia  -     Lipid Panel; Future  -Patient had yogurt approximately 5 hours ago, she is going to have labs drawn at [facility] today  -Will notify patient results once received    5. Candida vaginitis  -     fluconazole (Diflucan) 150 MG tablet; Take 1 tablet by mouth 1 (One) Time for 1 dose. May repeat in 72 hours prn  Dispense: 1 tablet; Refill: 1  Candida likely related to elevated glucose    -Patient currently does not have health insurance  -We will adjust medicines via telephone and plan to see patient in the office again in 3 months or earlier if needed    "

## 2020-06-16 ENCOUNTER — RESULTS ENCOUNTER (OUTPATIENT)
Dept: FAMILY MEDICINE CLINIC | Facility: CLINIC | Age: 50
End: 2020-06-16

## 2020-06-16 DIAGNOSIS — R53.83 FATIGUE, UNSPECIFIED TYPE: ICD-10-CM

## 2020-06-16 DIAGNOSIS — E78.2 MIXED HYPERLIPIDEMIA: ICD-10-CM

## 2020-06-16 RX ORDER — LOSARTAN POTASSIUM 100 MG/1
TABLET ORAL
Qty: 90 TABLET | Refills: 0 | Status: SHIPPED | OUTPATIENT
Start: 2020-06-16 | End: 2020-09-15

## 2020-06-18 ENCOUNTER — TELEPHONE (OUTPATIENT)
Dept: FAMILY MEDICINE CLINIC | Facility: CLINIC | Age: 50
End: 2020-06-18

## 2020-06-18 LAB
BUN SERPL-MCNC: 29 MG/DL (ref 6–24)
BUN/CREAT SERPL: 15 (ref 9–23)
CALCIUM SERPL-MCNC: 10.1 MG/DL (ref 8.7–10.2)
CHLORIDE SERPL-SCNC: 84 MMOL/L (ref 96–106)
CHOLEST SERPL-MCNC: 129 MG/DL (ref 100–199)
CO2 SERPL-SCNC: 23 MMOL/L (ref 20–29)
CREAT SERPL-MCNC: 1.99 MG/DL (ref 0.57–1)
ERYTHROCYTE [DISTWIDTH] IN BLOOD BY AUTOMATED COUNT: 13.6 % (ref 11.7–15.4)
GLUCOSE SERPL-MCNC: 701 MG/DL (ref 65–99)
HBA1C MFR BLD: 14.5 % (ref 4.8–5.6)
HCT VFR BLD AUTO: 41.7 % (ref 34–46.6)
HDLC SERPL-MCNC: 24 MG/DL
HGB BLD-MCNC: 12.7 G/DL (ref 11.1–15.9)
LDLC SERPL CALC-MCNC: ABNORMAL MG/DL (ref 0–99)
MCH RBC QN AUTO: 25.7 PG (ref 26.6–33)
MCHC RBC AUTO-ENTMCNC: 30.5 G/DL (ref 31.5–35.7)
MCV RBC AUTO: 84 FL (ref 79–97)
POTASSIUM SERPL-SCNC: 5.1 MMOL/L (ref 3.5–5.2)
RBC # BLD AUTO: 4.95 X10E6/UL (ref 3.77–5.28)
SODIUM SERPL-SCNC: 128 MMOL/L (ref 134–144)
TRIGL SERPL-MCNC: 654 MG/DL (ref 0–149)
TSH SERPL DL<=0.005 MIU/L-ACNC: 2.59 UIU/ML (ref 0.45–4.5)
VLDLC SERPL CALC-MCNC: ABNORMAL MG/DL (ref 5–40)
WBC # BLD AUTO: 10 X10E3/UL (ref 3.4–10.8)

## 2020-06-18 RX ORDER — PIOGLITAZONEHYDROCHLORIDE 30 MG/1
30 TABLET ORAL DAILY
Qty: 30 TABLET | Refills: 2 | Status: SHIPPED | OUTPATIENT
Start: 2020-06-18 | End: 2020-06-18 | Stop reason: SDUPTHER

## 2020-06-18 RX ORDER — PIOGLITAZONEHYDROCHLORIDE 30 MG/1
30 TABLET ORAL DAILY
Qty: 30 TABLET | Refills: 2 | Status: SHIPPED | OUTPATIENT
Start: 2020-06-18 | End: 2020-10-08 | Stop reason: SDUPTHER

## 2020-06-18 NOTE — TELEPHONE ENCOUNTER
Her hgba1c is very high at 14.5, trigs very high as well over 600, she has to make major diet changes, <30g carbs per meal, limiting breads/sugars/sodas/sweets, fatty, fried, greasy foods and increasing exercise habits. Kidney function slightly off and needs to drink more water but also r/t high glucose, She really needs insulin, but I know she doesn't have insurance. She needs to start actos, I am sending to pharmacy, is on [facility] $4 list. Needs to return for for labs as soon as she has insurance.

## 2020-06-23 ENCOUNTER — TELEPHONE (OUTPATIENT)
Dept: FAMILY MEDICINE CLINIC | Facility: CLINIC | Age: 50
End: 2020-06-23

## 2020-06-23 NOTE — TELEPHONE ENCOUNTER
Patient called to report that her blood sugar is dropping.  She said it was 300-400s on Sat and Monday morning it was 273.

## 2020-06-24 NOTE — TELEPHONE ENCOUNTER
Yes preferably if she has insurance and can get them prior to the appt. She'll need a diabetic panel.

## 2020-06-25 NOTE — TELEPHONE ENCOUNTER
When I spoke to patient, I did put her on the lab schedule.  I told her to assume to come in the week prior unless I tell her otherwise.

## 2020-06-29 ENCOUNTER — TELEPHONE (OUTPATIENT)
Dept: FAMILY MEDICINE CLINIC | Facility: CLINIC | Age: 50
End: 2020-06-29

## 2020-06-29 NOTE — TELEPHONE ENCOUNTER
Patient went below 200s this weekend.  Has been fluctuating 188-200s.  Last night she began having heart palpitations and sweating.  Her BG dropped below 117.  The lowest it got was 98.  She asked what kind of changes you would like to make.  She has been taking 20 units of insulin.

## 2020-06-29 NOTE — TELEPHONE ENCOUNTER
Pt called and I also put her thru to MA line to leave detailed message but she needs a call back today.  Her Blood sugar has dropped very low. She is concerned. bm

## 2020-06-29 NOTE — TELEPHONE ENCOUNTER
Those ranges are perfect, 98 will feel low right now until her body adjusts back to what normal glucose feels like, but really is in good range. If she is mostly 170-200 I don't want to change. If at nighttime before insulin she is <160 she can take 1/2 the dose, 10 units. Thanks. She should keep a small snack on hand for if she starts to feel low, peanut butter pretzels/crackers, or 4 oz of juice

## 2020-07-20 ENCOUNTER — TELEPHONE (OUTPATIENT)
Dept: FAMILY MEDICINE CLINIC | Facility: CLINIC | Age: 50
End: 2020-07-20

## 2020-08-06 DIAGNOSIS — E11.65 CONTROLLED TYPE 2 DIABETES MELLITUS WITH HYPERGLYCEMIA, WITHOUT LONG-TERM CURRENT USE OF INSULIN (HCC): ICD-10-CM

## 2020-08-06 RX ORDER — METFORMIN HYDROCHLORIDE 500 MG/1
1000 TABLET, EXTENDED RELEASE ORAL 2 TIMES DAILY
Qty: 120 TABLET | Refills: 5 | Status: SHIPPED | OUTPATIENT
Start: 2020-08-06 | End: 2020-10-08 | Stop reason: SDUPTHER

## 2020-09-15 RX ORDER — ATORVASTATIN CALCIUM 40 MG/1
TABLET, FILM COATED ORAL
Qty: 90 TABLET | Refills: 0 | Status: SHIPPED | OUTPATIENT
Start: 2020-09-15 | End: 2020-10-08 | Stop reason: SDUPTHER

## 2020-09-15 RX ORDER — LOSARTAN POTASSIUM 100 MG/1
TABLET ORAL
Qty: 90 TABLET | Refills: 0 | Status: SHIPPED | OUTPATIENT
Start: 2020-09-15 | End: 2020-10-08 | Stop reason: SDUPTHER

## 2020-09-15 RX ORDER — OMEPRAZOLE 20 MG/1
CAPSULE, DELAYED RELEASE ORAL
Qty: 90 CAPSULE | Refills: 0 | Status: SHIPPED | OUTPATIENT
Start: 2020-09-15 | End: 2020-10-08 | Stop reason: SDUPTHER

## 2020-10-08 ENCOUNTER — OFFICE VISIT (OUTPATIENT)
Dept: FAMILY MEDICINE CLINIC | Facility: CLINIC | Age: 50
End: 2020-10-08

## 2020-10-08 VITALS
WEIGHT: 223.8 LBS | TEMPERATURE: 96.8 F | BODY MASS INDEX: 35.12 KG/M2 | DIASTOLIC BLOOD PRESSURE: 74 MMHG | HEART RATE: 84 BPM | HEIGHT: 67 IN | SYSTOLIC BLOOD PRESSURE: 108 MMHG | OXYGEN SATURATION: 97 %

## 2020-10-08 DIAGNOSIS — E11.9 TYPE 2 DIABETES MELLITUS WITHOUT COMPLICATION, WITH LONG-TERM CURRENT USE OF INSULIN (HCC): Primary | ICD-10-CM

## 2020-10-08 DIAGNOSIS — Z12.31 BREAST CANCER SCREENING BY MAMMOGRAM: ICD-10-CM

## 2020-10-08 DIAGNOSIS — I10 ESSENTIAL HYPERTENSION: ICD-10-CM

## 2020-10-08 DIAGNOSIS — F41.9 ANXIETY: ICD-10-CM

## 2020-10-08 DIAGNOSIS — Z12.11 COLON CANCER SCREENING: ICD-10-CM

## 2020-10-08 DIAGNOSIS — F33.1 MODERATE EPISODE OF RECURRENT MAJOR DEPRESSIVE DISORDER (HCC): ICD-10-CM

## 2020-10-08 DIAGNOSIS — N94.6 DYSMENORRHEA: ICD-10-CM

## 2020-10-08 DIAGNOSIS — K58.2 IRRITABLE BOWEL SYNDROME WITH BOTH CONSTIPATION AND DIARRHEA: ICD-10-CM

## 2020-10-08 DIAGNOSIS — Z00.00 PREVENTATIVE HEALTH CARE: ICD-10-CM

## 2020-10-08 DIAGNOSIS — Z79.4 TYPE 2 DIABETES MELLITUS WITHOUT COMPLICATION, WITH LONG-TERM CURRENT USE OF INSULIN (HCC): Primary | ICD-10-CM

## 2020-10-08 DIAGNOSIS — E78.2 MIXED HYPERLIPIDEMIA: ICD-10-CM

## 2020-10-08 PROCEDURE — 90686 IIV4 VACC NO PRSV 0.5 ML IM: CPT | Performed by: NURSE PRACTITIONER

## 2020-10-08 PROCEDURE — 83036 HEMOGLOBIN GLYCOSYLATED A1C: CPT | Performed by: NURSE PRACTITIONER

## 2020-10-08 PROCEDURE — 80053 COMPREHEN METABOLIC PANEL: CPT | Performed by: NURSE PRACTITIONER

## 2020-10-08 PROCEDURE — 36415 COLL VENOUS BLD VENIPUNCTURE: CPT | Performed by: NURSE PRACTITIONER

## 2020-10-08 PROCEDURE — 90471 IMMUNIZATION ADMIN: CPT | Performed by: NURSE PRACTITIONER

## 2020-10-08 PROCEDURE — 85027 COMPLETE CBC AUTOMATED: CPT | Performed by: NURSE PRACTITIONER

## 2020-10-08 PROCEDURE — 84443 ASSAY THYROID STIM HORMONE: CPT | Performed by: NURSE PRACTITIONER

## 2020-10-08 PROCEDURE — 80061 LIPID PANEL: CPT | Performed by: NURSE PRACTITIONER

## 2020-10-08 PROCEDURE — 99214 OFFICE O/P EST MOD 30 MIN: CPT | Performed by: NURSE PRACTITIONER

## 2020-10-08 PROCEDURE — 86803 HEPATITIS C AB TEST: CPT | Performed by: NURSE PRACTITIONER

## 2020-10-08 PROCEDURE — 82043 UR ALBUMIN QUANTITATIVE: CPT | Performed by: NURSE PRACTITIONER

## 2020-10-08 RX ORDER — ATORVASTATIN CALCIUM 40 MG/1
40 TABLET, FILM COATED ORAL EVERY EVENING
Qty: 90 TABLET | Refills: 3 | Status: SHIPPED | OUTPATIENT
Start: 2020-10-08 | End: 2021-12-06

## 2020-10-08 RX ORDER — HYDRALAZINE HYDROCHLORIDE 25 MG/1
25 TABLET, FILM COATED ORAL 2 TIMES DAILY
Qty: 180 TABLET | Refills: 1 | Status: SHIPPED | OUTPATIENT
Start: 2020-10-08 | End: 2021-04-06

## 2020-10-08 RX ORDER — LOSARTAN POTASSIUM 100 MG/1
100 TABLET ORAL DAILY
Qty: 90 TABLET | Refills: 1 | Status: SHIPPED | OUTPATIENT
Start: 2020-10-08 | End: 2021-05-17 | Stop reason: SDUPTHER

## 2020-10-08 RX ORDER — DESVENLAFAXINE SUCCINATE 50 MG/1
50 TABLET, EXTENDED RELEASE ORAL DAILY
Qty: 90 TABLET | Refills: 1 | Status: SHIPPED | OUTPATIENT
Start: 2020-10-08 | End: 2021-05-17 | Stop reason: SDUPTHER

## 2020-10-08 RX ORDER — HYDROCHLOROTHIAZIDE 25 MG/1
25 TABLET ORAL DAILY
Qty: 90 TABLET | Refills: 1 | Status: SHIPPED | OUTPATIENT
Start: 2020-10-08 | End: 2021-04-14

## 2020-10-08 RX ORDER — METFORMIN HYDROCHLORIDE 500 MG/1
500 TABLET, EXTENDED RELEASE ORAL 2 TIMES DAILY
Qty: 180 TABLET | Refills: 1 | Status: SHIPPED | OUTPATIENT
Start: 2020-10-08 | End: 2021-04-23

## 2020-10-08 RX ORDER — PIOGLITAZONEHYDROCHLORIDE 30 MG/1
30 TABLET ORAL DAILY
Qty: 90 TABLET | Refills: 1 | Status: SHIPPED | OUTPATIENT
Start: 2020-10-08 | End: 2021-05-17 | Stop reason: SDUPTHER

## 2020-10-08 RX ORDER — OMEPRAZOLE 20 MG/1
20 CAPSULE, DELAYED RELEASE ORAL DAILY
Qty: 90 CAPSULE | Refills: 3 | Status: SHIPPED | OUTPATIENT
Start: 2020-10-08 | End: 2021-08-02

## 2020-10-08 RX ORDER — METOPROLOL TARTRATE 50 MG/1
50 TABLET, FILM COATED ORAL 2 TIMES DAILY
Qty: 180 TABLET | Refills: 1 | Status: SHIPPED | OUTPATIENT
Start: 2020-10-08 | End: 2021-04-14

## 2020-10-08 NOTE — PROGRESS NOTES
Chief Complaint   Patient presents with   • Diabetes   • Follow-up     would like to discuss starting birth control. would like pneumo and flu vax today, as well as ref to [facility].  she reports that she has had a colonoscopy but it has been 16-17 yrs.   • Med Refill     pt reports that she has been out of actos for several weeks.    • Constipation     alternating diarrhea       Diabetes mellitus type II, pt now on metformin xr 1000mg BID w/ inc GI effects, tresiba 20u daily, and out of actos 30 mg for approx 2 wks. She reports worsening of IBS since on tresiba vs inc of metformin. BG now ranging 120-130. Denies any signs/symptoms of hyper/hypoglycemia, blurry vision has resolved, denies polydipsia, polyuria, nocturia, and unintentional weight loss    IBS alternating constipation/diarrhea, LLQ pain, denies nausea, vomiting. Denies blood/mucus in stool.     HTN, stable on meds and takes as directed, denies chest pain, headache, shortness of air, palpitations and swelling of extremities.     Anxiety, patient denies significant weight loss/gain, insomnia, hypersomnia, psychomotor agitation, psychomotor retardation, fatigue (loss of energy), feelings of worthlessness (guilt), impaired concentration (indecisiveness), thoughts of death or suicide.  follows with Ulysses remy, on pristiq would like to inc.      Hyperlipidemia, stable on statin, the patient denies muscle aches, constipation, diarrhea, GI upset, fatigue, chest pain/pressure, exercise intolerance, dyspnea, palpitations, syncope and pedal edema.      Abnormal menses, follows with GYN, would like to discuss ocp vs ablation      The following portions of the patient's history were reviewed and updated as appropriate: allergies, current medications, past family history, past medical history, past social history, past surgical history and problem list.    Past Medical History:   Diagnosis Date   • Allergies    • Depression    • Diabetes mellitus (CMS/[facility])     • GERD (gastroesophageal reflux disease)    • Hyperlipidemia    • Hypertension    • Pneumonia      Past Surgical History:   Procedure Laterality Date   • COLONOSCOPY       Family History   Problem Relation Age of Onset   • Heart failure Mother    • Cancer Father    • Heart failure Father    • Ovarian cancer Maternal Grandmother      Social History     Tobacco Use   • Smoking status: Never Smoker   • Smokeless tobacco: Never Used   Substance Use Topics   • Alcohol use: No     Frequency: Never         Current Outpatient Medications:   •  albuterol sulfate  (90 Base) MCG/ACT inhaler, Inhale 2 puffs Every 4 (Four) Hours As Needed for Wheezing or Shortness of Air., Disp: 1 inhaler, Rfl: 0  •  Ascorbic Acid (VITAMIN C PO), Take  by mouth., Disp: , Rfl:   •  atorvastatin (LIPITOR) 40 MG tablet, Take 1 tablet by mouth Every Evening., Disp: 90 tablet, Rfl: 3  •  Cetirizine HCl (ZYRTEC ALLERGY) 10 MG capsule, ZYRTEC ALLERGY 10 MG CAPS, Disp: , Rfl:   •  desvenlafaxine (PRISTIQ) 50 MG 24 hr tablet, Take 1 tablet by mouth Daily., Disp: 90 tablet, Rfl: 1  •  hydrALAZINE (APRESOLINE) 25 MG tablet, Take 1 tablet by mouth 2 (Two) Times a Day., Disp: 180 tablet, Rfl: 1  •  hydroCHLOROthiazide (HYDRODIURIL) 25 MG tablet, Take 1 tablet by mouth Daily., Disp: 90 tablet, Rfl: 1  •  hydrOXYzine (ATARAX) 25 MG tablet, TAKE ONE TABLET BY MOUTH DAILY AS NEEDED FOR ANXIETY, Disp: 90 tablet, Rfl: 2  •  lamoTRIgine (LAMICTAL) 100 MG tablet, Take 100 mg by mouth Daily., Disp: , Rfl:   •  losartan (COZAAR) 100 MG tablet, Take 1 tablet by mouth Daily., Disp: 90 tablet, Rfl: 1  •  MAGNESIUM PO, Take  by mouth., Disp: , Rfl:   •  metFORMIN ER (GLUCOPHAGE-XR) 500 MG 24 hr tablet, Take 1 tablet by mouth 2 (Two) Times a Day., Disp: 180 tablet, Rfl: 1  •  metoprolol tartrate (LOPRESSOR) 50 MG tablet, Take 1 tablet by mouth 2 (Two) Times a Day., Disp: 180 tablet, Rfl: 1  •  Omega-3 Fatty Acids (FISH OIL) 1000 MG capsule capsule, FISH OIL 1000  "MG CAPS, Disp: , Rfl:   •  omeprazole (priLOSEC) 20 MG capsule, Take 1 capsule by mouth Daily., Disp: 90 capsule, Rfl: 3  •  POTASSIUM PO, Take  by mouth., Disp: , Rfl:   •  insulin degludec (TRESIBA FLEXTOUCH) 100 UNIT/ML solution pen-injector injection, Inject 20 Units under the skin into the appropriate area as directed Daily., Disp: 6 mL, Rfl: 11  •  pioglitazone (Actos) 30 MG tablet, Take 1 tablet by mouth Daily., Disp: 90 tablet, Rfl: 1  No current facility-administered medications for this visit.       Review of Systems   Constitutional: Negative for fatigue and fever.   HENT: Positive for congestion and rhinorrhea.    Respiratory: Positive for cough.    Cardiovascular: Negative for chest pain, palpitations and leg swelling.   Gastrointestinal: Positive for abdominal pain, constipation and diarrhea. Negative for nausea, rectal pain, GERD and indigestion.   Genitourinary: Positive for menstrual problem. Negative for amenorrhea, breast lump, breast pain, difficulty urinating and dyspareunia.   Musculoskeletal: Negative.    Neurological: Negative.    Psychiatric/Behavioral: Negative for depressed mood. The patient is nervous/anxious.        Vitals:    10/08/20 0810   BP: 108/74   BP Location: Left arm   Patient Position: Sitting   Cuff Size: Large Adult   Pulse: 84   Temp: 96.8 °F (36 °C)   TempSrc: Skin   SpO2: 97%   Weight: 102 kg (223 lb 12.8 oz)   Height: 170.2 cm (67.01\")     Body mass index is 35.04 kg/m².    Physical Exam  Vitals signs and nursing note reviewed.   Constitutional:       General: She is not in acute distress.     Appearance: She is well-developed. She is not diaphoretic.   Eyes:      Pupils: Pupils are equal, round, and reactive to light.   Neck:      Musculoskeletal: Normal range of motion and neck supple.      Thyroid: No thyromegaly.      Vascular: No JVD.   Cardiovascular:      Rate and Rhythm: Normal rate and regular rhythm.      Heart sounds: Normal heart sounds. No murmur. "   Pulmonary:      Effort: Pulmonary effort is normal. No respiratory distress.      Breath sounds: Normal breath sounds.   Abdominal:      General: Bowel sounds are normal. There is no distension.      Palpations: Abdomen is soft.      Tenderness: There is no abdominal tenderness.   Musculoskeletal: Normal range of motion.         General: No tenderness.   Skin:     General: Skin is warm and dry.   Neurological:      Mental Status: She is alert and oriented to person, place, and time.      Sensory: No sensory deficit.   Psychiatric:         Behavior: Behavior normal.         Thought Content: Thought content normal.         Judgment: Judgment normal.         No visits with results within 7 Day(s) from this visit.   Latest known visit with results is:   Orders Only on 06/15/2020   Component Date Value Ref Range Status   • WBC 06/15/2020 10.0  3.4 - 10.8 x10E3/uL Final   • RBC 06/15/2020 4.95  3.77 - 5.28 x10E6/uL Final   • Hemoglobin 06/15/2020 12.7  11.1 - 15.9 g/dL Final   • Hematocrit 06/15/2020 41.7  34.0 - 46.6 % Final   • MCV 06/15/2020 84  79 - 97 fL Final   • MCH 06/15/2020 25.7* 26.6 - 33.0 pg Final   • MCHC 06/15/2020 30.5* 31.5 - 35.7 g/dL Final   • RDW 06/15/2020 13.6  11.7 - 15.4 % Final   • Glucose 06/15/2020 701* 65 - 99 mg/dL Final    **Verified by repeat analysis**   • BUN 06/15/2020 29* 6 - 24 mg/dL Final   • Creatinine 06/15/2020 1.99* 0.57 - 1.00 mg/dL Final   • eGFR Non African Am 06/15/2020 29* >59 mL/min/1.73 Final   • eGFR African Am 06/15/2020 34* >59 mL/min/1.73 Final   • BUN/Creatinine Ratio 06/15/2020 15  9 - 23 Final   • Sodium 06/15/2020 128* 134 - 144 mmol/L Final   • Potassium 06/15/2020 5.1  3.5 - 5.2 mmol/L Final   • Chloride 06/15/2020 84* 96 - 106 mmol/L Final   • Total CO2 06/15/2020 23  20 - 29 mmol/L Final   • Calcium 06/15/2020 10.1  8.7 - 10.2 mg/dL Final   • Total Cholesterol 06/15/2020 129  100 - 199 mg/dL Final   • Triglycerides 06/15/2020 654* 0 - 149 mg/dL Final   • HDL  Cholesterol 06/15/2020 24* >39 mg/dL Final   • VLDL Cholesterol 06/15/2020 Comment  5 - 40 mg/dL Final    Comment: The calculation for the VLDL cholesterol is not valid when  triglyceride level is >400 mg/dL.     • LDL Cholesterol  06/15/2020 Comment  0 - 99 mg/dL Final    Comment: Triglyceride result indicated is too high for an accurate LDL  cholesterol estimation.     • Hemoglobin A1C 06/15/2020 14.5* 4.8 - 5.6 % Final    Comment:          Prediabetes: 5.7 - 6.4           Diabetes: >6.4           Glycemic control for adults with diabetes: <7.0     • TSH 06/15/2020 2.590  0.450 - 4.500 uIU/mL Final       Diagnoses and all orders for this visit:    1. Type 2 diabetes mellitus without complication, with long-term current use of insulin (CMS/[facility]) (Primary)  Comments:  last a1c 14.5, now BG ~120's daily, cont Tresiba 20 units, rec trial decrease metformin to 500 BID d/t GI s/e. cont/resume Actos. labs today, notify results  Orders:  -     insulin degludec (TRESIBA FLEXTOUCH) 100 UNIT/ML solution pen-injector injection; Inject 20 Units under the skin into the appropriate area as directed Daily.  Dispense: 6 mL; Refill: 11  -     pioglitazone (Actos) 30 MG tablet; Take 1 tablet by mouth Daily.  Dispense: 90 tablet; Refill: 1  -     metFORMIN ER (GLUCOPHAGE-XR) 500 MG 24 hr tablet; Take 1 tablet by mouth 2 (Two) Times a Day.  Dispense: 180 tablet; Refill: 1  -     Comprehensive Metabolic Panel  -     CBC (No Diff)  -     Hemoglobin A1c  -     Lipid Panel  -     TSH  -     MicroAlbumin, Urine, Random - Urine, Clean Catch    2. Dysmenorrhea  Comments:  Recommend patient discuss OCP versus ablation with GYN, Dr. Fernandez, likely not a great candidate for OCP    3. Irritable bowel syndrome with both constipation and diarrhea  Comments:  ?  Exacerbated by increase of metformin, continue Imodium and/or Gas-X as needed, patient declines Bentyl    4. Anxiety  Comments:  Worse, lately, keep follow-up with psych    5. Moderate  episode of recurrent major depressive disorder (CMS/HCC)  Comments:  Worse lately, increased Pristiq until patient is able to follow-up with psychiatry  Orders:  -     desvenlafaxine (PRISTIQ) 50 MG 24 hr tablet; Take 1 tablet by mouth Daily.  Dispense: 90 tablet; Refill: 1    6. Colon cancer screening  -     Cologuard - Stool, Per Rectum; Future    7. Preventative health care  -     Fluarix/Fluzone/Afluria Quad>6 Months  -     pneumococcal polysaccharide 23-valent (PNEUMOVAX-23) vaccine 0.5 mL  -     Hepatitis C Antibody    8. Breast cancer screening by mammogram  -     Mammo Screening Digital Tomosynthesis Bilateral With CAD; Future    9. Mixed hyperlipidemia  Comments:  very high trigs 3mo ago, cont, rf statin.   Orders:  -     atorvastatin (LIPITOR) 40 MG tablet; Take 1 tablet by mouth Every Evening.  Dispense: 90 tablet; Refill: 3    10. Essential hypertension  Comments:  bp stable, rf meds. rtc in 3mo or earlier prn.   Orders:  -     metoprolol tartrate (LOPRESSOR) 50 MG tablet; Take 1 tablet by mouth 2 (Two) Times a Day.  Dispense: 180 tablet; Refill: 1  -     hydrALAZINE (APRESOLINE) 25 MG tablet; Take 1 tablet by mouth 2 (Two) Times a Day.  Dispense: 180 tablet; Refill: 1  -     hydroCHLOROthiazide (HYDRODIURIL) 25 MG tablet; Take 1 tablet by mouth Daily.  Dispense: 90 tablet; Refill: 1  -     losartan (COZAAR) 100 MG tablet; Take 1 tablet by mouth Daily.  Dispense: 90 tablet; Refill: 1    Other orders  -     omeprazole (priLOSEC) 20 MG capsule; Take 1 capsule by mouth Daily.  Dispense: 90 capsule; Refill: 3       Will titrate tresiba prn    Return in about 3 months (around 1/8/2021) for dm, htn, lipids.

## 2020-10-09 ENCOUNTER — RESULTS ENCOUNTER (OUTPATIENT)
Dept: FAMILY MEDICINE CLINIC | Facility: CLINIC | Age: 50
End: 2020-10-09

## 2020-10-09 DIAGNOSIS — Z12.11 COLON CANCER SCREENING: ICD-10-CM

## 2020-10-09 LAB
ALBUMIN SERPL-MCNC: 4.8 G/DL (ref 3.5–5.2)
ALBUMIN UR-MCNC: 3.2 MG/DL
ALBUMIN/GLOB SERPL: 1.5 G/DL
ALP SERPL-CCNC: 138 U/L (ref 39–117)
ALT SERPL W P-5'-P-CCNC: 44 U/L (ref 1–33)
ANION GAP SERPL CALCULATED.3IONS-SCNC: 14.3 MMOL/L (ref 5–15)
AST SERPL-CCNC: 75 U/L (ref 1–32)
BILIRUB SERPL-MCNC: 0.4 MG/DL (ref 0–1.2)
BUN SERPL-MCNC: 18 MG/DL (ref 6–20)
BUN/CREAT SERPL: 13.3 (ref 7–25)
CALCIUM SPEC-SCNC: 9.8 MG/DL (ref 8.6–10.5)
CHLORIDE SERPL-SCNC: 94 MMOL/L (ref 98–107)
CHOLEST SERPL-MCNC: 182 MG/DL (ref 0–200)
CO2 SERPL-SCNC: 27.7 MMOL/L (ref 22–29)
CREAT SERPL-MCNC: 1.35 MG/DL (ref 0.57–1)
DEPRECATED RDW RBC AUTO: 38.7 FL (ref 37–54)
ERYTHROCYTE [DISTWIDTH] IN BLOOD BY AUTOMATED COUNT: 13.8 % (ref 12.3–15.4)
GFR SERPL CREATININE-BSD FRML MDRD: 42 ML/MIN/1.73
GLOBULIN UR ELPH-MCNC: 3.1 GM/DL
GLUCOSE SERPL-MCNC: 155 MG/DL (ref 65–99)
HBA1C MFR BLD: 7.3 % (ref 3.5–5.6)
HCT VFR BLD AUTO: 37.4 % (ref 34–46.6)
HCV AB SER DONR QL: NORMAL
HDLC SERPL-MCNC: 33 MG/DL (ref 40–60)
HGB BLD-MCNC: 11.9 G/DL (ref 12–15.9)
LDLC SERPL CALC-MCNC: 101 MG/DL (ref 0–100)
LDLC/HDLC SERPL: 2.79 {RATIO}
MCH RBC QN AUTO: 24.9 PG (ref 26.6–33)
MCHC RBC AUTO-ENTMCNC: 31.8 G/DL (ref 31.5–35.7)
MCV RBC AUTO: 78.2 FL (ref 79–97)
PLATELET # BLD AUTO: 578 10*3/MM3 (ref 140–450)
PMV BLD AUTO: 10.9 FL (ref 6–12)
POTASSIUM SERPL-SCNC: 3.9 MMOL/L (ref 3.5–5.2)
PROT SERPL-MCNC: 7.9 G/DL (ref 6–8.5)
RBC # BLD AUTO: 4.78 10*6/MM3 (ref 3.77–5.28)
SODIUM SERPL-SCNC: 136 MMOL/L (ref 136–145)
TRIGL SERPL-MCNC: 284 MG/DL (ref 0–150)
TSH SERPL DL<=0.05 MIU/L-ACNC: 2.09 UIU/ML (ref 0.27–4.2)
VLDLC SERPL-MCNC: 48 MG/DL (ref 5–40)
WBC # BLD AUTO: 12.15 10*3/MM3 (ref 3.4–10.8)

## 2020-10-12 DIAGNOSIS — I10 ESSENTIAL HYPERTENSION: ICD-10-CM

## 2020-10-12 RX ORDER — HYDROCHLOROTHIAZIDE 25 MG/1
TABLET ORAL
Qty: 90 TABLET | Refills: 0 | OUTPATIENT
Start: 2020-10-12

## 2020-10-28 DIAGNOSIS — Z79.4 TYPE 2 DIABETES MELLITUS WITHOUT COMPLICATION, WITH LONG-TERM CURRENT USE OF INSULIN (HCC): ICD-10-CM

## 2020-10-28 DIAGNOSIS — E11.9 TYPE 2 DIABETES MELLITUS WITHOUT COMPLICATION, WITH LONG-TERM CURRENT USE OF INSULIN (HCC): ICD-10-CM

## 2021-01-22 ENCOUNTER — TELEMEDICINE (OUTPATIENT)
Dept: FAMILY MEDICINE CLINIC | Facility: CLINIC | Age: 51
End: 2021-01-22

## 2021-01-22 VITALS — TEMPERATURE: 99.1 F | RESPIRATION RATE: 18 BRPM | HEIGHT: 67 IN | BODY MASS INDEX: 37.67 KG/M2 | WEIGHT: 240 LBS

## 2021-01-22 DIAGNOSIS — U07.1 COVID-19: ICD-10-CM

## 2021-01-22 DIAGNOSIS — A09 DIARRHEA OF INFECTIOUS ORIGIN: Primary | ICD-10-CM

## 2021-01-22 PROCEDURE — 99213 OFFICE O/P EST LOW 20 MIN: CPT | Performed by: NURSE PRACTITIONER

## 2021-01-22 RX ORDER — DIPHENOXYLATE HYDROCHLORIDE AND ATROPINE SULFATE 2.5; .025 MG/1; MG/1
1 TABLET ORAL 4 TIMES DAILY PRN
Qty: 20 TABLET | Refills: 0 | Status: SHIPPED | OUTPATIENT
Start: 2021-01-22 | End: 2021-01-27

## 2021-01-22 RX ORDER — ONDANSETRON 4 MG/1
4 TABLET, FILM COATED ORAL EVERY 8 HOURS PRN
Qty: 30 TABLET | Refills: 0 | Status: SHIPPED | OUTPATIENT
Start: 2021-01-22 | End: 2021-02-01

## 2021-01-22 NOTE — PROGRESS NOTES
"Chief Complaint  GI Problem (r/t COVID)    Subjective          You have chosen to receive care through a telehealth visit. Do you consent to use a telehealth visit for your medical care today? Yes    Rosita Siegel presents to [facility] for   History of Present Illness    Diarrhea: Patient diagnosed with COVID19 on 1/13. She reports that she has been having persistent diarrhea since 1/11. Patient reports that she had 2-3 episodes daily over the last 48 hours after taking Imodium. Stool is watery but without blood.  Patient c/o nausea, denies any vomiting. She is also running a fever at night only ranging from 99 to 100.4 degrees.  Patient does have a history of irritable bowel syndrome but reports she is diet controlled.  Patient reports mild abdominal pain to the left lower quadrant.  Patient is able to drink but reports her appetite is poor.  She does have a history of diabetes and is monitoring her glucose carefully to ensure she does not have any hypoglycemia.  Objective   Vital Signs:   Temp 99.1 °F (37.3 °C)   Resp 18   Ht 170.2 cm (67\")   Wt 109 kg (240 lb)   BMI 37.59 kg/m²     Physical Exam  Constitutional:       Appearance: She is obese.   Neurological:      Mental Status: She is alert and oriented to person, place, and time.   Psychiatric:         Attention and Perception: Attention normal.         Mood and Affect: Mood normal.         Speech: Speech normal.        Result Review :                 Assessment and Plan    Problem List Items Addressed This Visit        Infectious Diseases    Diarrhea of infectious origin - Primary    Current Assessment & Plan     1. Start Lomotil PRN diarrhea, do not exceed 4 doses in one day  2. Zofran 4mg TID PRN nausea  3. Push clear fluids  4. BRAT diet  5. CBC CMP ESR CRP to be drawn 1/25 - COVID19 vs IBS flare  6. Stool studies ordered   7. Proceed to ER with severe abdominal pain or inability to keep food/drink down         " Relevant Medications    diphenoxylate-atropine (Lomotil) 2.5-0.025 MG per tablet    Other Relevant Orders    CBC & Differential    Comprehensive Metabolic Panel    Clostridium Difficile EIA - Stool, Per Rectum    C-reactive protein    Sedimentation rate, automated    Gastrointestinal Panel, PCR - Stool, Per Rectum       Other    COVID-19        I spent 25 minutes caring for Rosita on this date of service. This time includes time spent by me in the following activities:preparing for the visit, obtaining and/or reviewing a separately obtained history, counseling and educating the patient/family/caregiver, ordering medications, tests, or procedures, documenting information in the medical record and care coordination  Follow Up    Return for labs on 1/25. .    Patient was given instructions and counseling regarding her condition or for health maintenance advice. Please see specific information pulled into the AVS if appropriate.     This was an audio and video enabled telemedicine encounter.

## 2021-01-22 NOTE — ASSESSMENT & PLAN NOTE
1. Start Lomotil PRN diarrhea, do not exceed 4 doses in one day  2. Zofran 4mg TID PRN nausea  3. Push clear fluids  4. BRAT diet  5. CBC CMP ESR CRP to be drawn 1/25 - COVID19 vs IBS flare  6. Stool studies ordered   7. Proceed to ER with severe abdominal pain or inability to keep food/drink down

## 2021-01-25 ENCOUNTER — TELEMEDICINE (OUTPATIENT)
Dept: FAMILY MEDICINE CLINIC | Facility: CLINIC | Age: 51
End: 2021-01-25

## 2021-01-25 ENCOUNTER — OFFICE VISIT (OUTPATIENT)
Dept: FAMILY MEDICINE CLINIC | Facility: CLINIC | Age: 51
End: 2021-01-25

## 2021-01-25 VITALS
DIASTOLIC BLOOD PRESSURE: 86 MMHG | HEIGHT: 67 IN | WEIGHT: 230.6 LBS | HEART RATE: 87 BPM | TEMPERATURE: 96.9 F | BODY MASS INDEX: 36.19 KG/M2 | OXYGEN SATURATION: 97 % | SYSTOLIC BLOOD PRESSURE: 119 MMHG

## 2021-01-25 VITALS — BODY MASS INDEX: 37.58 KG/M2 | HEIGHT: 67 IN | TEMPERATURE: 99.1 F

## 2021-01-25 DIAGNOSIS — U07.1: ICD-10-CM

## 2021-01-25 DIAGNOSIS — H67.2: ICD-10-CM

## 2021-01-25 DIAGNOSIS — Z53.21 PATIENT LEFT WITHOUT BEING SEEN: Primary | ICD-10-CM

## 2021-01-25 PROCEDURE — 99213 OFFICE O/P EST LOW 20 MIN: CPT | Performed by: NURSE PRACTITIONER

## 2021-01-25 RX ORDER — AZITHROMYCIN 250 MG/1
TABLET, FILM COATED ORAL
Qty: 6 TABLET | Refills: 1 | Status: SHIPPED | OUTPATIENT
Start: 2021-01-25 | End: 2021-05-17

## 2021-01-25 NOTE — PROGRESS NOTES
"Chief Complaint  Earache (left ear with drainage and pain ), GI Problem, Follow-up (from last friday), and Fatigue    Subjective          Rosita APPLE Siegel presents to [facility] for   Earache   There is pain in the left ear. This is a new problem. The current episode started 1 to 4 weeks ago. The problem occurs constantly. The problem has been rapidly worsening. The maximum temperature recorded prior to her arrival was 100.4 - 100.9 F. The fever has been present for 5 days or more. The pain is at a severity of 8/10. The pain is moderate. Associated symptoms include abdominal pain, diarrhea, ear discharge and neck pain. Pertinent negatives include no coughing. She has tried nothing for the symptoms. The treatment provided no relief.   GI Problem  The primary symptoms include fatigue, abdominal pain and diarrhea. The illness began more than 7 days ago. The problem has been resolved.   Fatigue  The problem occurs constantly. The problem has been unchanged. Associated symptoms include abdominal pain, fatigue and neck pain. Pertinent negatives include no coughing. Nothing aggravates the symptoms. She has tried nothing for the symptoms.        Tested pos for covid on 1/13, 1/14 received IV infusion, has had ear pain/congestion since with diarrhea resolved.     Objective   Vital Signs:   /86 (BP Location: Left arm, Patient Position: Sitting, Cuff Size: Large Adult)   Pulse 87   Temp 96.9 °F (36.1 °C) (Infrared)   Ht 170.2 cm (67.01\")   Wt 105 kg (230 lb 9.6 oz)   SpO2 97%   BMI 36.11 kg/m²       Physical Exam  Vitals signs and nursing note reviewed.   Constitutional:       General: She is not in acute distress.     Appearance: She is well-developed. She is not diaphoretic.   HENT:      Right Ear: Tympanic membrane, ear canal and external ear normal.      Ears:      Comments: L TM with effusion and erythematous canal  Eyes:      Pupils: Pupils are equal, round, and reactive to " light.   Neck:      Musculoskeletal: Normal range of motion and neck supple.      Thyroid: No thyromegaly.      Vascular: No JVD.   Cardiovascular:      Rate and Rhythm: Normal rate and regular rhythm.      Heart sounds: Normal heart sounds. No murmur.   Pulmonary:      Effort: Pulmonary effort is normal. No respiratory distress.      Breath sounds: Normal breath sounds.   Abdominal:      General: Bowel sounds are normal. There is no distension.      Palpations: Abdomen is soft.      Tenderness: There is no abdominal tenderness.   Musculoskeletal: Normal range of motion.         General: No tenderness.   Skin:     General: Skin is warm and dry.   Neurological:      Mental Status: She is alert and oriented to person, place, and time.      Sensory: No sensory deficit.   Psychiatric:         Behavior: Behavior normal.         Thought Content: Thought content normal.         Judgment: Judgment normal.          Result Review :                 Assessment and Plan    Problem List Items Addressed This Visit     None      Visit Diagnoses     Otitis media of left ear due to severe acute respiratory syndrome coronavirus 2 (SARS-CoV-2)        start zpack, drops for 5 days. otc's for temporary symptom tx prn. rtc or call if wonb in 3-4 days.         I spent 15 minutes caring for Rosita on this date of service. This time includes time spent by me in the following activities:preparing for the visit, reviewing tests, ordering medications, tests, or procedures and documenting information in the medical record  Follow Up   Return in about 4 weeks (around 2/22/2021) for diabetic panel 1 week prior to appt. f/u on DM, GERD, HTN.  Patient was given instructions and counseling regarding her condition or for health maintenance advice. Please see specific information pulled into the AVS if appropriate.

## 2021-02-08 ENCOUNTER — TELEPHONE (OUTPATIENT)
Dept: FAMILY MEDICINE CLINIC | Facility: CLINIC | Age: 51
End: 2021-02-08

## 2021-02-08 NOTE — TELEPHONE ENCOUNTER
PATIENT IS CALLING ABOUT SAMPLES FOR insulin degludec (TRESIBA FLEXTOUCH) 100 UNIT/ML solution pen-injector injection. SHE HAS BEEN TRYING FOR WEEKS TO SEE IF YOU HAD GOTTEN ANY SAMPLES IN. PLEASE ADVISE  [phone]     SHE WANTED ME TO SEND MESSAGE AND TRANSFER HER TO OFFICE.

## 2021-03-18 ENCOUNTER — OFFICE VISIT (OUTPATIENT)
Dept: FAMILY MEDICINE CLINIC | Facility: CLINIC | Age: 51
End: 2021-03-18

## 2021-03-18 ENCOUNTER — TELEPHONE (OUTPATIENT)
Dept: FAMILY MEDICINE CLINIC | Facility: CLINIC | Age: 51
End: 2021-03-18

## 2021-03-18 VITALS
DIASTOLIC BLOOD PRESSURE: 73 MMHG | SYSTOLIC BLOOD PRESSURE: 110 MMHG | HEIGHT: 67 IN | HEART RATE: 74 BPM | BODY MASS INDEX: 37.57 KG/M2 | OXYGEN SATURATION: 97 % | TEMPERATURE: 97.3 F | WEIGHT: 239.4 LBS

## 2021-03-18 DIAGNOSIS — S93.402A SPRAIN OF LEFT ANKLE, UNSPECIFIED LIGAMENT, INITIAL ENCOUNTER: Primary | ICD-10-CM

## 2021-03-18 DIAGNOSIS — E11.9 TYPE 2 DIABETES MELLITUS WITHOUT COMPLICATION, WITH LONG-TERM CURRENT USE OF INSULIN (HCC): ICD-10-CM

## 2021-03-18 DIAGNOSIS — I10 ESSENTIAL HYPERTENSION: ICD-10-CM

## 2021-03-18 DIAGNOSIS — Z79.4 TYPE 2 DIABETES MELLITUS WITHOUT COMPLICATION, WITH LONG-TERM CURRENT USE OF INSULIN (HCC): ICD-10-CM

## 2021-03-18 PROCEDURE — 99213 OFFICE O/P EST LOW 20 MIN: CPT | Performed by: NURSE PRACTITIONER

## 2021-03-18 NOTE — TELEPHONE ENCOUNTER
I called patient to let her know that we are unable to email info with her PHI d/t [facility], but I did fax the order to [place].

## 2021-03-18 NOTE — TELEPHONE ENCOUNTER
Caller: Rosita Siegel    Relationship to patient: Self    Best call back number: [phone] (H)    Patient is needing: PATIENT CALLING IN REGARDS TO GETTING XRAY REFERRAL FROM [facility] PATIENT WOULD LIKE REFERRAL EMAILED TO HER IF POSSIBLE       PLEASE ADVISE

## 2021-03-18 NOTE — PROGRESS NOTES
Chief Complaint  Ankle Injury and Immunizations (discuss covid vaccine)    Subjective          Rosita Siegel presents to [facility] for   Ankle Injury   The incident occurred more than 1 week ago. The incident occurred at home. The injury mechanism was a fall. The pain is present in the left ankle. The quality of the pain is described as aching and shooting. The pain is moderate. The pain has been constant since onset. Associated symptoms include an inability to bear weight and muscle weakness. Pertinent negatives include no loss of motion, loss of sensation, numbness or tingling. She reports no foreign bodies present. The symptoms are aggravated by movement and weight bearing. She has tried ice, elevation, rest and NSAIDs for the symptoms. The treatment provided moderate relief.       The following portions of the patient's history were reviewed and updated as appropriate: allergies, current medications, past family history, past medical history, past social history, past surgical history and problem list.    Past Medical History:   Diagnosis Date   • Allergies    • Depression    • Diabetes mellitus (CMS/HCC)    • GERD (gastroesophageal reflux disease)    • Hyperlipidemia    • Hypertension    • Pneumonia      Past Surgical History:   Procedure Laterality Date   • COLONOSCOPY       Family History   Problem Relation Age of Onset   • Heart failure Mother    • Cancer Father    • Heart failure Father    • Ovarian cancer Maternal Grandmother      Social History     Tobacco Use   • Smoking status: Never Smoker   • Smokeless tobacco: Never Used   Substance Use Topics   • Alcohol use: No       Current Outpatient Medications:   •  albuterol sulfate  (90 Base) MCG/ACT inhaler, Inhale 2 puffs Every 4 (Four) Hours As Needed for Wheezing or Shortness of Air., Disp: 1 inhaler, Rfl: 0  •  atorvastatin (LIPITOR) 40 MG tablet, Take 1 tablet by mouth Every Evening., Disp: 90 tablet, Rfl: 3  •   azithromycin (Zithromax) 250 MG tablet, Take 2 tablets the first day, then 1 tablet daily for 4 days., Disp: 6 tablet, Rfl: 1  •  Cetirizine HCl (ZYRTEC ALLERGY) 10 MG capsule, ZYRTEC ALLERGY 10 MG CAPS, Disp: , Rfl:   •  desvenlafaxine (PRISTIQ) 50 MG 24 hr tablet, Take 1 tablet by mouth Daily., Disp: 90 tablet, Rfl: 1  •  hydrALAZINE (APRESOLINE) 25 MG tablet, Take 1 tablet by mouth 2 (Two) Times a Day., Disp: 180 tablet, Rfl: 1  •  hydroCHLOROthiazide (HYDRODIURIL) 25 MG tablet, Take 1 tablet by mouth Daily., Disp: 90 tablet, Rfl: 1  •  hydrOXYzine (ATARAX) 25 MG tablet, TAKE ONE TABLET BY MOUTH DAILY AS NEEDED FOR ANXIETY, Disp: 90 tablet, Rfl: 2  •  insulin degludec (TRESIBA FLEXTOUCH) 100 UNIT/ML solution pen-injector injection, Inject 20 Units under the skin into the appropriate area as directed Daily., Disp: 2 pen, Rfl: 0  •  lamoTRIgine (LAMICTAL) 100 MG tablet, Take 100 mg by mouth Daily., Disp: , Rfl:   •  losartan (COZAAR) 100 MG tablet, Take 1 tablet by mouth Daily., Disp: 90 tablet, Rfl: 1  •  MAGNESIUM PO, Take  by mouth., Disp: , Rfl:   •  metFORMIN ER (GLUCOPHAGE-XR) 500 MG 24 hr tablet, Take 1 tablet by mouth 2 (Two) Times a Day., Disp: 180 tablet, Rfl: 1  •  metoprolol tartrate (LOPRESSOR) 50 MG tablet, Take 1 tablet by mouth 2 (Two) Times a Day., Disp: 180 tablet, Rfl: 1  •  neomycin-polymyxin-hydrocortisone (CORTISPORIN) 3.5-69754-8 otic solution, Administer 3 drops into the left ear 4 (Four) Times a Day. 5 days, Disp: 10 mL, Rfl: 0  •  Omega-3 Fatty Acids (FISH OIL) 1000 MG capsule capsule, FISH OIL 1000 MG CAPS, Disp: , Rfl:   •  omeprazole (priLOSEC) 20 MG capsule, Take 1 capsule by mouth Daily., Disp: 90 capsule, Rfl: 3  •  pioglitazone (Actos) 30 MG tablet, Take 1 tablet by mouth Daily., Disp: 90 tablet, Rfl: 1  •  POTASSIUM PO, Take  by mouth., Disp: , Rfl:   •  Ascorbic Acid (VITAMIN C PO), Take  by mouth., Disp: , Rfl:     Objective   Vital Signs:   /73 (BP Location: Left arm,  "Patient Position: Sitting, Cuff Size: Large Adult)   Pulse 74   Temp 97.3 °F (36.3 °C) (Infrared)   Ht 170.2 cm (67.01\")   Wt 109 kg (239 lb 6.4 oz)   SpO2 97%   BMI 37.49 kg/m²     Physical Exam  Vitals and nursing note reviewed.   Constitutional:       General: She is not in acute distress.     Appearance: She is well-developed. She is not diaphoretic.   Eyes:      Pupils: Pupils are equal, round, and reactive to light.   Neck:      Thyroid: No thyromegaly.      Vascular: No JVD.   Cardiovascular:      Rate and Rhythm: Normal rate and regular rhythm.      Heart sounds: Normal heart sounds. No murmur heard.     Pulmonary:      Effort: Pulmonary effort is normal. No respiratory distress.      Breath sounds: Normal breath sounds.   Abdominal:      General: Bowel sounds are normal. There is no distension.      Palpations: Abdomen is soft.      Tenderness: There is no abdominal tenderness.   Musculoskeletal:         General: Swelling ( Left lateral malleolus edematous and tender, with tenderness into anterior lower leg, good active/passive range of motion, difficulty with weightbearing and ambulation), tenderness and signs of injury present. Normal range of motion.      Cervical back: Normal range of motion and neck supple.   Skin:     General: Skin is warm and dry.   Neurological:      Mental Status: She is alert and oriented to person, place, and time.      Sensory: No sensory deficit.   Psychiatric:         Behavior: Behavior normal.         Thought Content: Thought content normal.         Judgment: Judgment normal.          Result Review :     No visits with results within 7 Day(s) from this visit.   Latest known visit with results is:   Office Visit on 10/08/2020   Component Date Value Ref Range Status   • Glucose 10/08/2020 155* 65 - 99 mg/dL Final   • BUN 10/08/2020 18  6 - 20 mg/dL Final   • Creatinine 10/08/2020 1.35* 0.57 - 1.00 mg/dL Final   • Sodium 10/08/2020 136  136 - 145 mmol/L Final   • Potassium " 10/08/2020 3.9  3.5 - 5.2 mmol/L Final   • Chloride 10/08/2020 94* 98 - 107 mmol/L Final   • CO2 10/08/2020 27.7  22.0 - 29.0 mmol/L Final   • Calcium 10/08/2020 9.8  8.6 - 10.5 mg/dL Final   • Total Protein 10/08/2020 7.9  6.0 - 8.5 g/dL Final   • Albumin 10/08/2020 4.80  3.50 - 5.20 g/dL Final   • ALT (SGPT) 10/08/2020 44* 1 - 33 U/L Final   • AST (SGOT) 10/08/2020 75* 1 - 32 U/L Final   • Alkaline Phosphatase 10/08/2020 138* 39 - 117 U/L Final   • Total Bilirubin 10/08/2020 0.4  0.0 - 1.2 mg/dL Final   • eGFR Non African Amer 10/08/2020 42* >60 mL/min/1.73 Final   • Globulin 10/08/2020 3.1  gm/dL Final   • A/G Ratio 10/08/2020 1.5  g/dL Final   • BUN/Creatinine Ratio 10/08/2020 13.3  7.0 - 25.0 Final   • Anion Gap 10/08/2020 14.3  5.0 - 15.0 mmol/L Final   • WBC 10/08/2020 12.15* 3.40 - 10.80 10*3/mm3 Final   • RBC 10/08/2020 4.78  3.77 - 5.28 10*6/mm3 Final   • Hemoglobin 10/08/2020 11.9* 12.0 - 15.9 g/dL Final   • Hematocrit 10/08/2020 37.4  34.0 - 46.6 % Final   • MCV 10/08/2020 78.2* 79.0 - 97.0 fL Final   • MCH 10/08/2020 24.9* 26.6 - 33.0 pg Final   • MCHC 10/08/2020 31.8  31.5 - 35.7 g/dL Final   • RDW 10/08/2020 13.8  12.3 - 15.4 % Final   • RDW-SD 10/08/2020 38.7  37.0 - 54.0 fl Final   • MPV 10/08/2020 10.9  6.0 - 12.0 fL Final   • Platelets 10/08/2020 578* 140 - 450 10*3/mm3 Final   • Hemoglobin A1C 10/08/2020 7.3* 3.5 - 5.6 % Final   • Total Cholesterol 10/08/2020 182  0 - 200 mg/dL Final   • Triglycerides 10/08/2020 284* 0 - 150 mg/dL Final   • HDL Cholesterol 10/08/2020 33* 40 - 60 mg/dL Final   • LDL Cholesterol  10/08/2020 101* 0 - 100 mg/dL Final   • VLDL Cholesterol 10/08/2020 48* 5 - 40 mg/dL Final   • LDL/HDL Ratio 10/08/2020 2.79   Final   • TSH 10/08/2020 2.090  0.270 - 4.200 uIU/mL Final   • Microalbumin, Urine 10/08/2020 3.2  mg/dL Final   • Hepatitis C Ab 10/08/2020 Non-Reactive  Non-Reactive Final                       Assessment and Plan    Diagnoses and all orders for this visit:    1.  Sprain of left ankle, unspecified ligament, initial encounter (Primary)  Comments:  CHRISTIANNE, okay to continue ankle brace, xray today and will notify results, start physical therapy, will recheck at appointment in 2 weeks  Orders:  -     XR Ankle 3+ View Left; Future  -     Ambulatory Referral to Physical Therapy Ortho, Evaluate and treat; ROM, Strengthening, Stretching; Full weight bearing (as toleratefd)    2. Type 2 diabetes mellitus without complication, with long-term current use of insulin (CMS/[facility])  Comments:  Patient missed appointment in February, needs fasting labs next week, follow-up in 2 weeks to discuss results.  Tresiba samples given today.  Orders:  -     Comprehensive Metabolic Panel; Future  -     CBC (No Diff); Future  -     Hemoglobin A1c; Future    3. Essential hypertension  Comments:  BP stable, check labs as ordered, will discuss all conditions further in 2 weeks  Orders:  -     Lipid Panel; Future  -     TSH; Future        Patient had COVID-19 virus in January, recommend waiting 90 days until receiving the vaccine.     I spent 15 minutes caring for Rosita on this date of service. This time includes time spent by me in the following activities:preparing for the visit, ordering medications, tests, or procedures and documenting information in the medical record    Follow Up   Return in about 2 weeks (around 4/1/2021) for DM, HTN, DM panel next week and appt following. .  Patient was given instructions and counseling regarding her condition or for health maintenance advice. Please see specific information pulled into the AVS if appropriate.

## 2021-04-06 DIAGNOSIS — I10 ESSENTIAL HYPERTENSION: ICD-10-CM

## 2021-04-06 RX ORDER — HYDRALAZINE HYDROCHLORIDE 25 MG/1
TABLET, FILM COATED ORAL
Qty: 180 TABLET | Refills: 0 | Status: SHIPPED | OUTPATIENT
Start: 2021-04-06 | End: 2021-04-06 | Stop reason: SDUPTHER

## 2021-04-06 RX ORDER — HYDRALAZINE HYDROCHLORIDE 25 MG/1
25 TABLET, FILM COATED ORAL 2 TIMES DAILY
Qty: 180 TABLET | Refills: 0 | Status: SHIPPED | OUTPATIENT
Start: 2021-04-06 | End: 2021-05-17 | Stop reason: SDUPTHER

## 2021-04-06 NOTE — TELEPHONE ENCOUNTER
Patient was seen in March for acute sprain of ankle.  She is diabetic and hypertensive, majority of her labs in October 2020 mostly abnormal.  Please see last note she was to be seen around this time with labs prior. thanks

## 2021-04-11 DIAGNOSIS — I10 ESSENTIAL HYPERTENSION: ICD-10-CM

## 2021-04-13 ENCOUNTER — TELEPHONE (OUTPATIENT)
Dept: FAMILY MEDICINE CLINIC | Facility: CLINIC | Age: 51
End: 2021-04-13

## 2021-04-13 NOTE — TELEPHONE ENCOUNTER
Left message for patient to remind her to have her lab work done and to also have her xray completed that was ordered. I advised that she contact the office to schedule the labs and to contact MA to schedule the xray or to let us know that it has been completed so we can request the results.

## 2021-04-14 RX ORDER — METOPROLOL TARTRATE 50 MG/1
TABLET, FILM COATED ORAL
Qty: 180 TABLET | Refills: 0 | Status: SHIPPED | OUTPATIENT
Start: 2021-04-14 | End: 2021-05-17 | Stop reason: SDUPTHER

## 2021-04-14 RX ORDER — HYDROCHLOROTHIAZIDE 25 MG/1
TABLET ORAL
Qty: 90 TABLET | Refills: 0 | Status: SHIPPED | OUTPATIENT
Start: 2021-04-14 | End: 2021-05-17 | Stop reason: SDUPTHER

## 2021-04-21 DIAGNOSIS — Z79.4 TYPE 2 DIABETES MELLITUS WITHOUT COMPLICATION, WITH LONG-TERM CURRENT USE OF INSULIN (HCC): ICD-10-CM

## 2021-04-21 DIAGNOSIS — E11.9 TYPE 2 DIABETES MELLITUS WITHOUT COMPLICATION, WITH LONG-TERM CURRENT USE OF INSULIN (HCC): ICD-10-CM

## 2021-04-21 NOTE — TELEPHONE ENCOUNTER
LOV 1/25/21  Left several message for patient about completing lab work.     Last refill 10/8/20 #180 with 1 refill

## 2021-04-23 RX ORDER — METFORMIN HYDROCHLORIDE 500 MG/1
TABLET, EXTENDED RELEASE ORAL
Qty: 60 TABLET | Refills: 0 | Status: SHIPPED | OUTPATIENT
Start: 2021-04-23 | End: 2021-05-17 | Stop reason: SDUPTHER

## 2021-04-23 NOTE — TELEPHONE ENCOUNTER
Pls also send letter and mychart msg to pt, this will be the last refill w/o labs and appt. She has not been seen for diabetes since 10/2020

## 2021-04-26 RX ORDER — FLUCONAZOLE 150 MG/1
150 TABLET ORAL DAILY
Qty: 3 TABLET | Refills: 0 | Status: SHIPPED | OUTPATIENT
Start: 2021-04-26 | End: 2021-04-29

## 2021-04-29 ENCOUNTER — LAB (OUTPATIENT)
Dept: FAMILY MEDICINE CLINIC | Facility: CLINIC | Age: 51
End: 2021-04-29

## 2021-04-29 DIAGNOSIS — Z79.4 TYPE 2 DIABETES MELLITUS WITHOUT COMPLICATION, WITH LONG-TERM CURRENT USE OF INSULIN (HCC): ICD-10-CM

## 2021-04-29 DIAGNOSIS — I10 ESSENTIAL HYPERTENSION: ICD-10-CM

## 2021-04-29 DIAGNOSIS — E11.9 TYPE 2 DIABETES MELLITUS WITHOUT COMPLICATION, WITH LONG-TERM CURRENT USE OF INSULIN (HCC): ICD-10-CM

## 2021-04-29 LAB — HBA1C MFR BLD: 8.4 % (ref 3.5–5.6)

## 2021-04-29 PROCEDURE — 84443 ASSAY THYROID STIM HORMONE: CPT | Performed by: NURSE PRACTITIONER

## 2021-04-29 PROCEDURE — 36415 COLL VENOUS BLD VENIPUNCTURE: CPT | Performed by: NURSE PRACTITIONER

## 2021-04-29 PROCEDURE — 83036 HEMOGLOBIN GLYCOSYLATED A1C: CPT | Performed by: NURSE PRACTITIONER

## 2021-04-29 PROCEDURE — 80053 COMPREHEN METABOLIC PANEL: CPT | Performed by: NURSE PRACTITIONER

## 2021-04-29 PROCEDURE — 80061 LIPID PANEL: CPT | Performed by: NURSE PRACTITIONER

## 2021-04-29 PROCEDURE — 85027 COMPLETE CBC AUTOMATED: CPT | Performed by: NURSE PRACTITIONER

## 2021-04-29 RX ORDER — HYDROXYZINE HYDROCHLORIDE 25 MG/1
25 TABLET, FILM COATED ORAL 2 TIMES DAILY PRN
Qty: 30 TABLET | Refills: 0 | Status: SHIPPED | OUTPATIENT
Start: 2021-04-29 | End: 2021-05-27

## 2021-04-30 LAB
ALBUMIN SERPL-MCNC: 4.4 G/DL (ref 3.5–5.2)
ALBUMIN/GLOB SERPL: 1.7 G/DL
ALP SERPL-CCNC: 118 U/L (ref 39–117)
ALT SERPL W P-5'-P-CCNC: 39 U/L (ref 1–33)
ANION GAP SERPL CALCULATED.3IONS-SCNC: 15 MMOL/L (ref 5–15)
AST SERPL-CCNC: 65 U/L (ref 1–32)
BILIRUB SERPL-MCNC: 0.3 MG/DL (ref 0–1.2)
BUN SERPL-MCNC: 19 MG/DL (ref 6–20)
BUN/CREAT SERPL: 15.7 (ref 7–25)
CALCIUM SPEC-SCNC: 9.2 MG/DL (ref 8.6–10.5)
CHLORIDE SERPL-SCNC: 100 MMOL/L (ref 98–107)
CHOLEST SERPL-MCNC: 181 MG/DL (ref 0–200)
CO2 SERPL-SCNC: 23 MMOL/L (ref 22–29)
CREAT SERPL-MCNC: 1.21 MG/DL (ref 0.57–1)
DEPRECATED RDW RBC AUTO: 40.6 FL (ref 37–54)
ERYTHROCYTE [DISTWIDTH] IN BLOOD BY AUTOMATED COUNT: 15.8 % (ref 12.3–15.4)
GFR SERPL CREATININE-BSD FRML MDRD: 47 ML/MIN/1.73
GLOBULIN UR ELPH-MCNC: 2.6 GM/DL
GLUCOSE SERPL-MCNC: 195 MG/DL (ref 65–99)
HCT VFR BLD AUTO: 32.7 % (ref 34–46.6)
HDLC SERPL-MCNC: 29 MG/DL (ref 40–60)
HGB BLD-MCNC: 10.3 G/DL (ref 12–15.9)
LDLC SERPL CALC-MCNC: 104 MG/DL (ref 0–100)
LDLC/HDLC SERPL: 3.33 {RATIO}
MCH RBC QN AUTO: 22.9 PG (ref 26.6–33)
MCHC RBC AUTO-ENTMCNC: 31.5 G/DL (ref 31.5–35.7)
MCV RBC AUTO: 72.8 FL (ref 79–97)
PLATELET # BLD AUTO: 509 10*3/MM3 (ref 140–450)
PMV BLD AUTO: 11.2 FL (ref 6–12)
POTASSIUM SERPL-SCNC: 3.8 MMOL/L (ref 3.5–5.2)
PROT SERPL-MCNC: 7 G/DL (ref 6–8.5)
RBC # BLD AUTO: 4.49 10*6/MM3 (ref 3.77–5.28)
SODIUM SERPL-SCNC: 138 MMOL/L (ref 136–145)
TRIGL SERPL-MCNC: 277 MG/DL (ref 0–150)
TSH SERPL DL<=0.05 MIU/L-ACNC: 2.36 UIU/ML (ref 0.27–4.2)
VLDLC SERPL-MCNC: 48 MG/DL (ref 5–40)
WBC # BLD AUTO: 10.8 10*3/MM3 (ref 3.4–10.8)

## 2021-05-17 ENCOUNTER — OFFICE VISIT (OUTPATIENT)
Dept: FAMILY MEDICINE CLINIC | Facility: CLINIC | Age: 51
End: 2021-05-17

## 2021-05-17 VITALS
OXYGEN SATURATION: 97 % | HEART RATE: 69 BPM | SYSTOLIC BLOOD PRESSURE: 100 MMHG | DIASTOLIC BLOOD PRESSURE: 66 MMHG | WEIGHT: 241 LBS | HEIGHT: 67 IN | TEMPERATURE: 97.7 F | BODY MASS INDEX: 37.83 KG/M2

## 2021-05-17 DIAGNOSIS — E11.22 CKD STAGE 3 DUE TO TYPE 2 DIABETES MELLITUS (HCC): ICD-10-CM

## 2021-05-17 DIAGNOSIS — N18.30 CKD STAGE 3 DUE TO TYPE 2 DIABETES MELLITUS (HCC): ICD-10-CM

## 2021-05-17 DIAGNOSIS — N92.0 MENORRHAGIA WITH REGULAR CYCLE: ICD-10-CM

## 2021-05-17 DIAGNOSIS — I10 ESSENTIAL HYPERTENSION: ICD-10-CM

## 2021-05-17 DIAGNOSIS — Z79.4 TYPE 2 DIABETES MELLITUS WITHOUT COMPLICATION, WITH LONG-TERM CURRENT USE OF INSULIN (HCC): ICD-10-CM

## 2021-05-17 DIAGNOSIS — K58.2 IRRITABLE BOWEL SYNDROME WITH BOTH CONSTIPATION AND DIARRHEA: ICD-10-CM

## 2021-05-17 DIAGNOSIS — F33.1 MODERATE EPISODE OF RECURRENT MAJOR DEPRESSIVE DISORDER (HCC): ICD-10-CM

## 2021-05-17 DIAGNOSIS — E53.8 VITAMIN B12 DEFICIENCY: ICD-10-CM

## 2021-05-17 DIAGNOSIS — D75.839 THROMBOCYTOSIS: ICD-10-CM

## 2021-05-17 DIAGNOSIS — D50.9 IRON DEFICIENCY ANEMIA, UNSPECIFIED IRON DEFICIENCY ANEMIA TYPE: Primary | ICD-10-CM

## 2021-05-17 DIAGNOSIS — E78.2 MIXED HYPERLIPIDEMIA: ICD-10-CM

## 2021-05-17 DIAGNOSIS — E11.9 TYPE 2 DIABETES MELLITUS WITHOUT COMPLICATION, WITH LONG-TERM CURRENT USE OF INSULIN (HCC): ICD-10-CM

## 2021-05-17 DIAGNOSIS — R79.89 ELEVATED LIVER FUNCTION TESTS: ICD-10-CM

## 2021-05-17 PROCEDURE — 99214 OFFICE O/P EST MOD 30 MIN: CPT | Performed by: NURSE PRACTITIONER

## 2021-05-17 RX ORDER — LAMOTRIGINE 100 MG/1
100 TABLET ORAL DAILY
Qty: 90 TABLET | Refills: 0 | Status: SHIPPED | OUTPATIENT
Start: 2021-05-17

## 2021-05-17 RX ORDER — METOPROLOL TARTRATE 50 MG/1
50 TABLET, FILM COATED ORAL 2 TIMES DAILY
Qty: 180 TABLET | Refills: 1 | Status: SHIPPED | OUTPATIENT
Start: 2021-05-17 | End: 2022-01-17 | Stop reason: SDUPTHER

## 2021-05-17 RX ORDER — HYDRALAZINE HYDROCHLORIDE 25 MG/1
25 TABLET, FILM COATED ORAL 2 TIMES DAILY
Qty: 180 TABLET | Refills: 1 | Status: SHIPPED | OUTPATIENT
Start: 2021-05-17 | End: 2022-01-05

## 2021-05-17 RX ORDER — PIOGLITAZONEHYDROCHLORIDE 30 MG/1
30 TABLET ORAL DAILY
Qty: 90 TABLET | Refills: 1 | Status: SHIPPED | OUTPATIENT
Start: 2021-05-17 | End: 2021-12-13 | Stop reason: SDUPTHER

## 2021-05-17 RX ORDER — METFORMIN HYDROCHLORIDE 500 MG/1
500 TABLET, EXTENDED RELEASE ORAL 2 TIMES DAILY
Qty: 180 TABLET | Refills: 1 | Status: SHIPPED | OUTPATIENT
Start: 2021-05-17 | End: 2022-01-31

## 2021-05-17 RX ORDER — FERROUS SULFATE 325(65) MG
325 TABLET ORAL
Qty: 90 TABLET | Refills: 1 | Status: SHIPPED | OUTPATIENT
Start: 2021-05-17

## 2021-05-17 RX ORDER — DESVENLAFAXINE SUCCINATE 50 MG/1
50 TABLET, EXTENDED RELEASE ORAL DAILY
Qty: 90 TABLET | Refills: 1 | Status: SHIPPED | OUTPATIENT
Start: 2021-05-17 | End: 2021-10-20

## 2021-05-17 RX ORDER — HYDROCHLOROTHIAZIDE 25 MG/1
25 TABLET ORAL DAILY
Qty: 90 TABLET | Refills: 1 | Status: SHIPPED | OUTPATIENT
Start: 2021-05-17 | End: 2021-12-17

## 2021-05-17 RX ORDER — LOSARTAN POTASSIUM 100 MG/1
100 TABLET ORAL DAILY
Qty: 90 TABLET | Refills: 1 | Status: SHIPPED | OUTPATIENT
Start: 2021-05-17 | End: 2021-12-06

## 2021-05-17 NOTE — PROGRESS NOTES
Chief Complaint  Diabetes, Hyperlipidemia, Hypertension, Follow-up, and Results    Subjective          Rosita Siegel presents to [facility] for   History of Present Illness     Diabetes mellitus type II, patient reports poor diet over the last several months and minimal exercise, she has been taking Tresiba 25 units, Actos, metformin 500 mg XR twice daily  (which was recently decreased due to GI upset with that 1000 mg xr twice daily), she denies blurry vision, polydipsia, polyuria, nocturia, and unintentional weight loss.  Reports blood glucose had been ranging 180-190 and now after changing her diet and monitoring carbohydrates is running 140's now.     Hyperlipidemia, The patient denies muscle aches, constipation, diarrhea, GI upset, fatigue, chest pain/pressure, exercise intolerance, dyspnea, palpitations, syncope and pedal edema.      HTN, stable on meds and takes as directed, denies chest pain, headache, shortness of air, palpitations and swelling of extremities.     Pt reports since having covid19 she does not feel good, overly tired, and not eating well.      Anemia, pt had not had a period for 3mo, then the week of labs had a very heavy period then another period 2 weeks later.  She is established with Dr. Ortiz. Denies blood in stool.     Anxiety/bipolar, she follows with Carmina Arora.  patient denies significant weight loss/gain, insomnia, hypersomnia, psychomotor agitation, psychomotor retardation, fatigue (loss of energy), feelings of worthlessness (guilt), impaired concentration (indecisiveness), thoughts of death or suicide.             The following portions of the patient's history were reviewed and updated as appropriate: allergies, current medications, past family history, past medical history, past social history, past surgical history and problem list.    Past Medical History:   Diagnosis Date   • Allergies    • Depression    • Diabetes mellitus (CMS/[facility])     • GERD (gastroesophageal reflux disease)    • Hyperlipidemia    • Hypertension    • Pneumonia      Past Surgical History:   Procedure Laterality Date   • COLONOSCOPY       Family History   Problem Relation Age of Onset   • Heart failure Mother    • Cancer Father    • Heart failure Father    • Ovarian cancer Maternal Grandmother      Social History     Tobacco Use   • Smoking status: Never Smoker   • Smokeless tobacco: Never Used   Substance Use Topics   • Alcohol use: No       Current Outpatient Medications:   •  albuterol sulfate  (90 Base) MCG/ACT inhaler, Inhale 2 puffs Every 4 (Four) Hours As Needed for Wheezing or Shortness of Air., Disp: 1 inhaler, Rfl: 0  •  Ascorbic Acid (VITAMIN C PO), Take  by mouth., Disp: , Rfl:   •  atorvastatin (LIPITOR) 40 MG tablet, Take 1 tablet by mouth Every Evening., Disp: 90 tablet, Rfl: 3  •  Cetirizine HCl (ZYRTEC ALLERGY) 10 MG capsule, ZYRTEC ALLERGY 10 MG CAPS, Disp: , Rfl:   •  desvenlafaxine (PRISTIQ) 50 MG 24 hr tablet, Take 1 tablet by mouth Daily., Disp: 90 tablet, Rfl: 1  •  hydrALAZINE (APRESOLINE) 25 MG tablet, Take 1 tablet by mouth 2 (Two) Times a Day., Disp: 180 tablet, Rfl: 1  •  hydroCHLOROthiazide (HYDRODIURIL) 25 MG tablet, Take 1 tablet by mouth Daily., Disp: 90 tablet, Rfl: 1  •  hydrOXYzine (ATARAX) 25 MG tablet, Take 1 tablet by mouth 2 (Two) Times a Day As Needed for Anxiety., Disp: 30 tablet, Rfl: 0  •  insulin degludec (TRESIBA FLEXTOUCH) 100 UNIT/ML solution pen-injector injection, Inject 25 Units under the skin into the appropriate area as directed Daily., Disp: 3 pen, Rfl: 1  •  lamoTRIgine (LaMICtal) 100 MG tablet, Take 1 tablet by mouth Daily., Disp: 90 tablet, Rfl: 0  •  losartan (COZAAR) 100 MG tablet, Take 1 tablet by mouth Daily., Disp: 90 tablet, Rfl: 1  •  MAGNESIUM PO, Take  by mouth., Disp: , Rfl:   •  metFORMIN ER (GLUCOPHAGE-XR) 500 MG 24 hr tablet, Take 1 tablet by mouth 2 (Two) Times a Day., Disp: 180 tablet, Rfl: 1  •   "metoprolol tartrate (LOPRESSOR) 50 MG tablet, Take 1 tablet by mouth 2 (Two) Times a Day., Disp: 180 tablet, Rfl: 1  •  Omega-3 Fatty Acids (FISH OIL) 1000 MG capsule capsule, FISH OIL 1000 MG CAPS, Disp: , Rfl:   •  omeprazole (priLOSEC) 20 MG capsule, Take 1 capsule by mouth Daily., Disp: 90 capsule, Rfl: 3  •  pioglitazone (Actos) 30 MG tablet, Take 1 tablet by mouth Daily., Disp: 90 tablet, Rfl: 1  •  POTASSIUM PO, Take  by mouth., Disp: , Rfl:   •  ferrous sulfate 325 (65 FE) MG tablet, Take 1 tablet by mouth Daily With Breakfast., Disp: 90 tablet, Rfl: 1    Objective   Vital Signs:   /66   Pulse 69   Temp 97.7 °F (36.5 °C) (Infrared)   Ht 170.2 cm (67\")   Wt 109 kg (241 lb)   SpO2 97%   BMI 37.75 kg/m²       Physical Exam  Vitals and nursing note reviewed.   Constitutional:       General: She is not in acute distress.     Appearance: She is well-developed. She is not diaphoretic.   Eyes:      Pupils: Pupils are equal, round, and reactive to light.   Neck:      Thyroid: No thyromegaly.      Vascular: No JVD.   Cardiovascular:      Rate and Rhythm: Normal rate and regular rhythm.      Heart sounds: Normal heart sounds. No murmur heard.     Pulmonary:      Effort: Pulmonary effort is normal. No respiratory distress.      Breath sounds: Normal breath sounds.   Abdominal:      General: Bowel sounds are normal. There is no distension.      Palpations: Abdomen is soft.      Tenderness: There is no abdominal tenderness.   Musculoskeletal:         General: No tenderness. Normal range of motion.      Cervical back: Normal range of motion and neck supple.   Skin:     General: Skin is warm and dry.   Neurological:      Mental Status: She is alert and oriented to person, place, and time.      Sensory: No sensory deficit.   Psychiatric:         Behavior: Behavior normal.         Thought Content: Thought content normal.         Judgment: Judgment normal.          Result Review :     No visits with results within " 7 Day(s) from this visit.   Latest known visit with results is:   Lab on 04/29/2021   Component Date Value Ref Range Status   • Glucose 04/29/2021 195* 65 - 99 mg/dL Final   • BUN 04/29/2021 19  6 - 20 mg/dL Final   • Creatinine 04/29/2021 1.21* 0.57 - 1.00 mg/dL Final   • Sodium 04/29/2021 138  136 - 145 mmol/L Final   • Potassium 04/29/2021 3.8  3.5 - 5.2 mmol/L Final   • Chloride 04/29/2021 100  98 - 107 mmol/L Final   • CO2 04/29/2021 23.0  22.0 - 29.0 mmol/L Final   • Calcium 04/29/2021 9.2  8.6 - 10.5 mg/dL Final   • Total Protein 04/29/2021 7.0  6.0 - 8.5 g/dL Final   • Albumin 04/29/2021 4.40  3.50 - 5.20 g/dL Final   • ALT (SGPT) 04/29/2021 39* 1 - 33 U/L Final   • AST (SGOT) 04/29/2021 65* 1 - 32 U/L Final   • Alkaline Phosphatase 04/29/2021 118* 39 - 117 U/L Final   • Total Bilirubin 04/29/2021 0.3  0.0 - 1.2 mg/dL Final   • eGFR Non African Amer 04/29/2021 47* >60 mL/min/1.73 Final   • Globulin 04/29/2021 2.6  gm/dL Final   • A/G Ratio 04/29/2021 1.7  g/dL Final   • BUN/Creatinine Ratio 04/29/2021 15.7  7.0 - 25.0 Final   • Anion Gap 04/29/2021 15.0  5.0 - 15.0 mmol/L Final   • WBC 04/29/2021 10.80  3.40 - 10.80 10*3/mm3 Final   • RBC 04/29/2021 4.49  3.77 - 5.28 10*6/mm3 Final   • Hemoglobin 04/29/2021 10.3* 12.0 - 15.9 g/dL Final   • Hematocrit 04/29/2021 32.7* 34.0 - 46.6 % Final   • MCV 04/29/2021 72.8* 79.0 - 97.0 fL Final    Not first occurrence     • MCH 04/29/2021 22.9* 26.6 - 33.0 pg Final   • MCHC 04/29/2021 31.5  31.5 - 35.7 g/dL Final   • RDW 04/29/2021 15.8* 12.3 - 15.4 % Final   • RDW-SD 04/29/2021 40.6  37.0 - 54.0 fl Final   • MPV 04/29/2021 11.2  6.0 - 12.0 fL Final   • Platelets 04/29/2021 509* 140 - 450 10*3/mm3 Final   • Hemoglobin A1C 04/29/2021 8.4* 3.5 - 5.6 % Final   • Total Cholesterol 04/29/2021 181  0 - 200 mg/dL Final   • Triglycerides 04/29/2021 277* 0 - 150 mg/dL Final   • HDL Cholesterol 04/29/2021 29* 40 - 60 mg/dL Final   • LDL Cholesterol  04/29/2021 104* 0 - 100 mg/dL  Final   • VLDL Cholesterol 04/29/2021 48* 5 - 40 mg/dL Final   • LDL/HDL Ratio 04/29/2021 3.33   Final   • TSH 04/29/2021 2.360  0.270 - 4.200 uIU/mL Final                       Assessment and Plan    Diagnoses and all orders for this visit:    1. Iron deficiency anemia, unspecified iron deficiency anemia type (Primary)  Comments:  start ferrous sulfate, if develops constipation, get otc docusate 1-4/day prn.   Orders:  -     Ambulatory Referral to Hematology    2. Menorrhagia with regular cycle  Comments:  pt to call Dr. Ortiz for appt to discuss options     3. Essential hypertension  -     metoprolol tartrate (LOPRESSOR) 50 MG tablet; Take 1 tablet by mouth 2 (Two) Times a Day.  Dispense: 180 tablet; Refill: 1  -     losartan (COZAAR) 100 MG tablet; Take 1 tablet by mouth Daily.  Dispense: 90 tablet; Refill: 1  -     hydroCHLOROthiazide (HYDRODIURIL) 25 MG tablet; Take 1 tablet by mouth Daily.  Dispense: 90 tablet; Refill: 1  -     hydrALAZINE (APRESOLINE) 25 MG tablet; Take 1 tablet by mouth 2 (Two) Times a Day.  Dispense: 180 tablet; Refill: 1    4. Irritable bowel syndrome with both constipation and diarrhea    5. Mixed hyperlipidemia  Comments:  lipids elevated, cont atorva, rec resume fish oil 2g daily. inc activity/exercise, work on HHD and incorporate with DM diet.     6. Type 2 diabetes mellitus without complication, with long-term current use of insulin (CMS/[facility])  Comments:  A1c up to 8.4, cont tresiba and inc to 25u, cont metformin xr 500mg BID and actos. could not lisandra met 1000mg d/t GI upset. repeat 3mo  Orders:  -     pioglitazone (Actos) 30 MG tablet; Take 1 tablet by mouth Daily.  Dispense: 90 tablet; Refill: 1  -     metFORMIN ER (GLUCOPHAGE-XR) 500 MG 24 hr tablet; Take 1 tablet by mouth 2 (Two) Times a Day.  Dispense: 180 tablet; Refill: 1  -     insulin degludec (TRESIBA FLEXTOUCH) 100 UNIT/ML solution pen-injector injection; Inject 25 Units under the skin into the appropriate area as directed  Daily.  Dispense: 3 pen; Refill: 1    7. Essential hypertension  Comments:  bp stable, rf meds. rtc in 3mo or earlier prn.   Orders:  -     metoprolol tartrate (LOPRESSOR) 50 MG tablet; Take 1 tablet by mouth 2 (Two) Times a Day.  Dispense: 180 tablet; Refill: 1  -     losartan (COZAAR) 100 MG tablet; Take 1 tablet by mouth Daily.  Dispense: 90 tablet; Refill: 1  -     hydroCHLOROthiazide (HYDRODIURIL) 25 MG tablet; Take 1 tablet by mouth Daily.  Dispense: 90 tablet; Refill: 1  -     hydrALAZINE (APRESOLINE) 25 MG tablet; Take 1 tablet by mouth 2 (Two) Times a Day.  Dispense: 180 tablet; Refill: 1    8. Moderate episode of recurrent major depressive disorder (CMS/HCC)  Comments:  stable and will f/u with Alla Arora, cont/rf lamictal andPristiq until patient is able to follow-up with psychiatry  Orders:  -     desvenlafaxine (PRISTIQ) 50 MG 24 hr tablet; Take 1 tablet by mouth Daily.  Dispense: 90 tablet; Refill: 1    9. CKD stage 3 due to type 2 diabetes mellitus (CMS/HCC)    10. Elevated liver function tests    11. Vitamin B12 deficiency  Comments:  also start b12 1000mcg daily, previous low b12 2018.   Orders:  -     Ambulatory Referral to Hematology    12. Thrombocytosis (CMS/HCC)  Comments:  family hx of clotting d/o, rec start [id number] daily. pt is now also anemic w/ abnormal menses and hx of low b12. will refer to heme to [facility].   Orders:  -     Ambulatory Referral to Hematology    Other orders  -     ferrous sulfate 325 (65 FE) MG tablet; Take 1 tablet by mouth Daily With Breakfast.  Dispense: 90 tablet; Refill: 1  -     lamoTRIgine (LaMICtal) 100 MG tablet; Take 1 tablet by mouth Daily.  Dispense: 90 tablet; Refill: 0        I spent 40 minutes caring for Rosita Siegel on this date of service. This time includes time spent by me in the following activities: preparing for the visit, reviewing tests, performing a medically appropriate examination and/or evaluation , counseling and educating the  patient/family/caregiver, ordering medications, tests, or procedures and documenting information in the medical record        Follow Up     Return in about 3 months (around 8/17/2021) for DM, abnormal menses, HTN, lipids, anemia/b12 def. DM panel and b12 level 1 wk prior to appt.  Patient was given instructions and counseling regarding her condition or for health maintenance advice. Please see specific information pulled into the AVS if appropriate.

## 2021-05-22 DIAGNOSIS — Z79.4 TYPE 2 DIABETES MELLITUS WITHOUT COMPLICATION, WITH LONG-TERM CURRENT USE OF INSULIN (HCC): ICD-10-CM

## 2021-05-22 DIAGNOSIS — E11.9 TYPE 2 DIABETES MELLITUS WITHOUT COMPLICATION, WITH LONG-TERM CURRENT USE OF INSULIN (HCC): ICD-10-CM

## 2021-05-24 RX ORDER — METFORMIN HYDROCHLORIDE 500 MG/1
500 TABLET, EXTENDED RELEASE ORAL 2 TIMES DAILY
Qty: 60 TABLET | Refills: 0 | OUTPATIENT
Start: 2021-05-24

## 2021-05-26 DIAGNOSIS — E11.9 TYPE 2 DIABETES MELLITUS WITHOUT COMPLICATION, WITH LONG-TERM CURRENT USE OF INSULIN (HCC): Primary | ICD-10-CM

## 2021-05-26 DIAGNOSIS — Z79.4 TYPE 2 DIABETES MELLITUS WITHOUT COMPLICATION, WITH LONG-TERM CURRENT USE OF INSULIN (HCC): Primary | ICD-10-CM

## 2021-05-26 RX ORDER — BLOOD-GLUCOSE METER
1 EACH MISCELLANEOUS TAKE AS DIRECTED
Qty: 1 KIT | Refills: 0 | Status: SHIPPED | OUTPATIENT
Start: 2021-05-26

## 2021-05-26 RX ORDER — BLOOD SUGAR DIAGNOSTIC
STRIP MISCELLANEOUS
Qty: 100 EACH | Refills: 12 | Status: SHIPPED | OUTPATIENT
Start: 2021-05-26

## 2021-05-27 RX ORDER — HYDROXYZINE HYDROCHLORIDE 25 MG/1
TABLET, FILM COATED ORAL
Qty: 30 TABLET | Refills: 2 | Status: SHIPPED | OUTPATIENT
Start: 2021-05-27 | End: 2021-07-16

## 2021-06-11 NOTE — PROGRESS NOTES
Hematology/Oncology Outpatient Consultation    Patient name: Rosita Siegel  : 1970  MRN: [id number]  Primary Care Physician: Loren Steel APRN  Referring Physician: Loren Steel APRN  Reason For Consult:     Chief Complaint   Patient presents with   • Appointment     thrombocytosis       History of Present Illness:    This is a 50-year-old female who has been known to have anemia for couple years. Patient had a colonoscopy first irritable bowel syndrome more than 10 years ago. She has intermittent and explosive diarrhea. She denies GI bleed, or blood in her urine. She had Covid back in 2021 and received IVIG. She has been a longtime diabetic with diabetic complications including peripheral neuropathy which has progressively gotten worse over the past 1 year. Patient has been on baby aspirin since May 2021. Due to the anemia she was placed on oral iron tablets in May 2021.  She also has a history of menorrhagia and has been recommended to have uterine ablation. Patient currently sees Dr. Jovanna Fernandez for this evaluation.    She had CBC on 1921 white count was 10.8, hemoglobin is 10.3, MCV was low at 72, RDW was high at 15.8 and platelets where 509.  She also had a chemistry panel BUN is 19, creatinine is 1.2, ALT was high at 39 AST was 65 and alkaline phosphatase was 118.  Her hemoglobin A1c was high at 8.4.  She also had hypercholesterolemia.  She has normal thyroid function.  Today her white count is 15.3, hemoglobin is up to 11.1 she has microcytosis at 72, RDW is still high at 16.6 and platelets are down to 279.  On her differential she has mild lymphocytosis.  Patient had viral hepatitis C antibody which was negative.    Patient has referred secondary to anemia. She is accompanied today by her mother for this appointment    Past Medical History:   Diagnosis Date   • Allergies    • Depression    • Diabetes mellitus (CMS/HCC)    • GERD (gastroesophageal reflux disease)    •  Hyperlipidemia    • Hypertension    • Pneumonia        Past Surgical History:   Procedure Laterality Date   • COLONOSCOPY           Current Outpatient Medications:   •  desvenlafaxine (Pristiq) 100 MG 24 hr tablet, Take 100 mg by mouth Daily., Disp: , Rfl:   •  albuterol sulfate  (90 Base) MCG/ACT inhaler, Inhale 2 puffs Every 4 (Four) Hours As Needed for Wheezing or Shortness of Air., Disp: 1 inhaler, Rfl: 0  •  Ascorbic Acid (VITAMIN C PO), Take  by mouth., Disp: , Rfl:   •  atorvastatin (LIPITOR) 40 MG tablet, Take 1 tablet by mouth Every Evening., Disp: 90 tablet, Rfl: 3  •  Blood Glucose Monitoring Suppl (OneTouch Verio Flex System) w/Device kit, 1 each Take As Directed. To check blood glucose BID, Disp: 1 kit, Rfl: 0  •  Cetirizine HCl (ZYRTEC ALLERGY) 10 MG capsule, ZYRTEC ALLERGY 10 MG CAPS, Disp: , Rfl:   •  desvenlafaxine (PRISTIQ) 50 MG 24 hr tablet, Take 1 tablet by mouth Daily., Disp: 90 tablet, Rfl: 1  •  ferrous sulfate 325 (65 FE) MG tablet, Take 1 tablet by mouth Daily With Breakfast., Disp: 90 tablet, Rfl: 1  •  glucose blood (OneTouch Verio) test strip, Use onetouch verio flex strips as instructed to check to check blood glucose BID, Disp: 100 each, Rfl: 12  •  hydrALAZINE (APRESOLINE) 25 MG tablet, Take 1 tablet by mouth 2 (Two) Times a Day., Disp: 180 tablet, Rfl: 1  •  hydroCHLOROthiazide (HYDRODIURIL) 25 MG tablet, Take 1 tablet by mouth Daily., Disp: 90 tablet, Rfl: 1  •  hydrOXYzine (ATARAX) 25 MG tablet, TAKE ONE TABLET BY MOUTH TWICE A DAY AS NEEDED FOR ANXIETY, Disp: 30 tablet, Rfl: 2  •  insulin degludec (TRESIBA FLEXTOUCH) 100 UNIT/ML solution pen-injector injection, Inject 25 Units under the skin into the appropriate area as directed Daily., Disp: 3 pen, Rfl: 1  •  lamoTRIgine (LaMICtal) 100 MG tablet, Take 1 tablet by mouth Daily., Disp: 90 tablet, Rfl: 0  •  losartan (COZAAR) 100 MG tablet, Take 1 tablet by mouth Daily., Disp: 90 tablet, Rfl: 1  •  MAGNESIUM PO, Take  by  mouth., Disp: , Rfl:   •  metFORMIN ER (GLUCOPHAGE-XR) 500 MG 24 hr tablet, Take 1 tablet by mouth 2 (Two) Times a Day., Disp: 180 tablet, Rfl: 1  •  metoprolol tartrate (LOPRESSOR) 50 MG tablet, Take 1 tablet by mouth 2 (Two) Times a Day., Disp: 180 tablet, Rfl: 1  •  Omega-3 Fatty Acids (FISH OIL) 1000 MG capsule capsule, FISH OIL 1000 MG CAPS, Disp: , Rfl:   •  omeprazole (priLOSEC) 20 MG capsule, Take 1 capsule by mouth Daily., Disp: 90 capsule, Rfl: 3  •  pioglitazone (Actos) 30 MG tablet, Take 1 tablet by mouth Daily., Disp: 90 tablet, Rfl: 1  •  POTASSIUM PO, Take  by mouth., Disp: , Rfl:     Allergies   Allergen Reactions   • Cephalexin Anaphylaxis   • Erythromycin Anaphylaxis   • Lisinopril Angioedema   • Penicillin G Anaphylaxis       Immunization History   Administered Date(s) Administered   • Flulaval/Fluarix/Fluzone Quad 10/08/2020   • Pneumococcal Polysaccharide (PPSV23) 10/08/2020       Family History   Problem Relation Age of Onset   • Heart failure Mother    • Cancer Father    • Heart failure Father    • Ovarian cancer Maternal Grandmother        Cancer-related family history includes Cancer in her father; Ovarian cancer in her maternal grandmother.    Social History     Tobacco Use   • Smoking status: Never Smoker   • Smokeless tobacco: Never Used   Substance Use Topics   • Alcohol use: No   • Drug use: No       ROS:    Review of Systems   Constitutional: Negative for chills and fever.   HENT: Negative for ear pain, mouth sores, nosebleeds and sore throat.    Eyes: Negative for photophobia and visual disturbance.   Respiratory: Negative for wheezing and stridor.    Cardiovascular: Negative for chest pain and palpitations.   Gastrointestinal: Negative for abdominal pain, diarrhea, nausea and vomiting.   Endocrine: Negative for cold intolerance and heat intolerance.   Genitourinary: Negative for dysuria and hematuria.   Musculoskeletal: Negative for joint swelling and neck stiffness.   Skin: Negative  "for color change and rash.   Neurological: Negative for seizures and syncope.   Hematological: Negative for adenopathy.        No obvious bleeding   Psychiatric/Behavioral: Negative for agitation, confusion and hallucinations.       Objective:    Vitals:    06/16/21 1518   BP: 108/71   Pulse: 73   Resp: 18   Temp: 97.8 °F (36.6 °C)   TempSrc: Temporal   Weight: 112 kg (246 lb)   Height: 170.2 cm (67\")   PainSc: 0-No pain     Body mass index is 38.53 kg/m².    ECOG  (0) Fully active, able to carry on all predisease performance without restriction    Physical Exam:  Physical Exam  Vitals and nursing note reviewed.   Constitutional:       General: She is not in acute distress.     Appearance: She is not diaphoretic.   HENT:      Head: Normocephalic and atraumatic.   Eyes:      General: No scleral icterus.        Right eye: No discharge.         Left eye: No discharge.      Conjunctiva/sclera: Conjunctivae normal.   Neck:      Thyroid: No thyromegaly.   Cardiovascular:      Rate and Rhythm: Normal rate and regular rhythm.      Heart sounds: Normal heart sounds. No friction rub. No gallop.    Pulmonary:      Effort: Pulmonary effort is normal. No respiratory distress.      Breath sounds: No stridor. No wheezing.   Abdominal:      General: Bowel sounds are normal.      Palpations: Abdomen is soft. There is no mass.      Tenderness: There is no abdominal tenderness. There is no guarding or rebound.   Musculoskeletal:         General: No tenderness. Normal range of motion.      Cervical back: Normal range of motion and neck supple.   Lymphadenopathy:      Cervical: No cervical adenopathy.   Skin:     General: Skin is warm.      Findings: No erythema or rash.   Neurological:      Mental Status: She is alert and oriented to person, place, and time.      Motor: No abnormal muscle tone.   Psychiatric:         Behavior: Behavior normal.         RECENT LABS  WBC   Date Value Ref Range Status   06/16/2021 15.31 (H) 3.40 - 10.80 " 10*3/mm3 Final   06/15/2020 10.0 3.4 - 10.8 x10E3/uL Final     RBC   Date Value Ref Range Status   06/16/2021 4.99 3.77 - 5.28 10*6/mm3 Final   06/15/2020 4.95 3.77 - 5.28 x10E6/uL Final     Hemoglobin   Date Value Ref Range Status   06/16/2021 11.1 (L) 12.0 - 15.9 g/dL Final     Hematocrit   Date Value Ref Range Status   06/16/2021 36.0 34.0 - 46.6 % Final     MCV   Date Value Ref Range Status   06/16/2021 72.1 (L) 79.0 - 97.0 fL Final     MCH   Date Value Ref Range Status   06/16/2021 22.2 (L) 26.6 - 33.0 pg Final     MCHC   Date Value Ref Range Status   06/16/2021 30.8 (L) 31.5 - 35.7 g/dL Final     RDW   Date Value Ref Range Status   06/16/2021 16.6 (H) 12.3 - 15.4 % Final     RDW-SD   Date Value Ref Range Status   06/16/2021 42.1 37.0 - 54.0 fl Final     MPV   Date Value Ref Range Status   06/16/2021 9.9 6.0 - 12.0 fL Final     Platelets   Date Value Ref Range Status   06/16/2021 279 140 - 450 10*3/mm3 Final     Neutrophil %   Date Value Ref Range Status   06/16/2021 69.6 42.7 - 76.0 % Final     Lymphocyte %   Date Value Ref Range Status   06/16/2021 22.5 19.6 - 45.3 % Final     Monocyte %   Date Value Ref Range Status   06/16/2021 4.5 (L) 5.0 - 12.0 % Final     Eosinophil %   Date Value Ref Range Status   06/16/2021 2.8 0.3 - 6.2 % Final     Basophil %   Date Value Ref Range Status   06/16/2021 0.6 0.0 - 1.5 % Final     Neutrophils, Absolute   Date Value Ref Range Status   06/16/2021 10.66 (H) 1.70 - 7.00 10*3/mm3 Final     Lymphocytes, Absolute   Date Value Ref Range Status   06/16/2021 3.44 (H) 0.70 - 3.10 10*3/mm3 Final     Monocytes, Absolute   Date Value Ref Range Status   06/16/2021 0.69 0.10 - 0.90 10*3/mm3 Final     Eosinophils, Absolute   Date Value Ref Range Status   06/16/2021 0.43 (H) 0.00 - 0.40 10*3/mm3 Final     Basophils, Absolute   Date Value Ref Range Status   06/16/2021 0.09 0.00 - 0.20 10*3/mm3 Final     nRBC   Date Value Ref Range Status   07/20/2018 0 0 /100[WBCs] Final       Lab Results    Component Value Date    GLUCOSE 195 (H) 04/29/2021    BUN 19 04/29/2021    CREATININE 1.21 (H) 04/29/2021    EGFRIFNONA 47 (L) 04/29/2021    EGFRIFAFRI 34 (L) 06/15/2020    BCR 15.7 04/29/2021    K 3.8 04/29/2021    CO2 23.0 04/29/2021    CALCIUM 9.2 04/29/2021    ALBUMIN 4.40 04/29/2021    LABIL2 1.0 07/20/2018    AST 65 (H) 04/29/2021    ALT 39 (H) 04/29/2021         Assessment/Plan   Thrombocytosis (CMS/HCC)  - CBC & Differential  - Ferritin  - Folate  - Haptoglobin  - Iron Profile  - Reticulocytes  - Vitamin B12  - Protein Elec + Interp, Serum  - Lactate Dehydrogenase  - Ambulatory Referral to Gastroenterology    Anemia, unspecified type  - Ferritin  - Folate  - Haptoglobin  - Iron Profile  - Reticulocytes  - Vitamin B12  - Protein Elec + Interp, Serum  - Lactate Dehydrogenase  - Ambulatory Referral to Gastroenterology      1. Microcytic anemia likely secondary to iron deficiency, anemia of chronic disease. Patient has several morbidities which can lead to anemia of chronic disease including diabetes. Was placed on oral iron supplementation in May 2021 and her hemoglobin has increased. She does have problems with GI tolerance of oral iron. Currently she is taking it every other day.  2. Thrombocytosis most likely reactive.  This can be seen with iron deficiency as well. With improvement in her hemoglobin, thrombocytosis has resolved based on the CBC done today  3. Diabetes type 2 poorly controlled  4. Peripheral neuropathy progressive most likely due to uncontrolled diabetes        Plans    · I reviewed her CBC prior to iron supplementation. Her hemoglobin has improved. Thrombocytosis has resolved. This to suggest that she most likely has iron deficiency. We will go ahead and order complete anemia panel. If her ferritin is low, due to poor tolerance of oral iron, would like to switch her to IV iron supplementation.  · We will continue to follow-up with her PCP for management of her diabetes and other medical  problems.  · I will plan to see her back in 2 weeks.      I have reviewed labs results, imaging, vitals, and medications with the patient today.          Patient verbalized understanding and is in agreement of the above plan.          Thank you very much allowing me to participate in the care of Rosita, I will keep you updated on her progress        I spent 45 total minutes, face-to-face, caring for Rosita today.  80% of this time involved counseling and/or coordination of care as documented within this note.

## 2021-06-15 ENCOUNTER — TELEPHONE (OUTPATIENT)
Dept: ONCOLOGY | Facility: CLINIC | Age: 51
End: 2021-06-15

## 2021-06-15 NOTE — TELEPHONE ENCOUNTER
Caller: Rosita Siegel    Relationship: Self    Best call back number: [phone]    What form or medical record are you requesting: NP PKT    Who is requesting this form or medical record from you: PT    How would you like to receive the form or medical records (pick-up, mail, fax): EMAIL  If Email, what is the address: [email or web address]    Timeframe paperwork needed: ASAP    Additional notes    PT NEVER RECEIVED NP PKT IN THE MAIL

## 2021-06-16 ENCOUNTER — CONSULT (OUTPATIENT)
Dept: ONCOLOGY | Facility: CLINIC | Age: 51
End: 2021-06-16

## 2021-06-16 ENCOUNTER — LAB (OUTPATIENT)
Dept: LAB | Facility: HOSPITAL | Age: 51
End: 2021-06-16

## 2021-06-16 VITALS
RESPIRATION RATE: 18 BRPM | WEIGHT: 246 LBS | SYSTOLIC BLOOD PRESSURE: 108 MMHG | HEIGHT: 67 IN | DIASTOLIC BLOOD PRESSURE: 71 MMHG | BODY MASS INDEX: 38.61 KG/M2 | HEART RATE: 73 BPM | TEMPERATURE: 97.8 F

## 2021-06-16 DIAGNOSIS — D64.9 ANEMIA, UNSPECIFIED TYPE: ICD-10-CM

## 2021-06-16 DIAGNOSIS — D75.839 THROMBOCYTOSIS: Primary | ICD-10-CM

## 2021-06-16 DIAGNOSIS — D75.839 THROMBOCYTOSIS: ICD-10-CM

## 2021-06-16 LAB
BASOPHILS # BLD AUTO: 0.09 10*3/MM3 (ref 0–0.2)
BASOPHILS NFR BLD AUTO: 0.6 % (ref 0–1.5)
DEPRECATED RDW RBC AUTO: 42.1 FL (ref 37–54)
EOSINOPHIL # BLD AUTO: 0.43 10*3/MM3 (ref 0–0.4)
EOSINOPHIL NFR BLD AUTO: 2.8 % (ref 0.3–6.2)
ERYTHROCYTE [DISTWIDTH] IN BLOOD BY AUTOMATED COUNT: 16.6 % (ref 12.3–15.4)
FERRITIN SERPL-MCNC: 17.23 NG/ML (ref 13–150)
HCT VFR BLD AUTO: 36 % (ref 34–46.6)
HGB BLD-MCNC: 11.1 G/DL (ref 12–15.9)
IRON 24H UR-MRATE: 22 MCG/DL (ref 37–145)
IRON SATN MFR SERPL: 4 % (ref 20–50)
LDH SERPL-CCNC: 186 U/L (ref 135–214)
LYMPHOCYTES # BLD AUTO: 3.44 10*3/MM3 (ref 0.7–3.1)
LYMPHOCYTES NFR BLD AUTO: 22.5 % (ref 19.6–45.3)
MCH RBC QN AUTO: 22.2 PG (ref 26.6–33)
MCHC RBC AUTO-ENTMCNC: 30.8 G/DL (ref 31.5–35.7)
MCV RBC AUTO: 72.1 FL (ref 79–97)
MONOCYTES # BLD AUTO: 0.69 10*3/MM3 (ref 0.1–0.9)
MONOCYTES NFR BLD AUTO: 4.5 % (ref 5–12)
NEUTROPHILS NFR BLD AUTO: 10.66 10*3/MM3 (ref 1.7–7)
NEUTROPHILS NFR BLD AUTO: 69.6 % (ref 42.7–76)
PLATELET # BLD AUTO: 279 10*3/MM3 (ref 140–450)
PMV BLD AUTO: 9.9 FL (ref 6–12)
RBC # BLD AUTO: 4.99 10*6/MM3 (ref 3.77–5.28)
RETICS # AUTO: 0.07 10*6/MM3 (ref 0.02–0.13)
RETICS/RBC NFR AUTO: 1.53 % (ref 0.7–1.9)
TIBC SERPL-MCNC: 554 MCG/DL (ref 298–536)
TRANSFERRIN SERPL-MCNC: 372 MG/DL (ref 200–360)
WBC # BLD AUTO: 15.31 10*3/MM3 (ref 3.4–10.8)

## 2021-06-16 PROCEDURE — 99204 OFFICE O/P NEW MOD 45 MIN: CPT | Performed by: INTERNAL MEDICINE

## 2021-06-16 PROCEDURE — 85045 AUTOMATED RETICULOCYTE COUNT: CPT | Performed by: INTERNAL MEDICINE

## 2021-06-16 PROCEDURE — 83010 ASSAY OF HAPTOGLOBIN QUANT: CPT | Performed by: INTERNAL MEDICINE

## 2021-06-16 PROCEDURE — 82746 ASSAY OF FOLIC ACID SERUM: CPT | Performed by: INTERNAL MEDICINE

## 2021-06-16 PROCEDURE — 84466 ASSAY OF TRANSFERRIN: CPT | Performed by: INTERNAL MEDICINE

## 2021-06-16 PROCEDURE — 85025 COMPLETE CBC W/AUTO DIFF WBC: CPT

## 2021-06-16 PROCEDURE — 82607 VITAMIN B-12: CPT | Performed by: INTERNAL MEDICINE

## 2021-06-16 PROCEDURE — 36415 COLL VENOUS BLD VENIPUNCTURE: CPT | Performed by: INTERNAL MEDICINE

## 2021-06-16 PROCEDURE — 82728 ASSAY OF FERRITIN: CPT | Performed by: INTERNAL MEDICINE

## 2021-06-16 PROCEDURE — 83615 LACTATE (LD) (LDH) ENZYME: CPT | Performed by: INTERNAL MEDICINE

## 2021-06-16 PROCEDURE — 83540 ASSAY OF IRON: CPT | Performed by: INTERNAL MEDICINE

## 2021-06-16 RX ORDER — DESVENLAFAXINE 100 MG/1
100 TABLET, EXTENDED RELEASE ORAL DAILY
COMMUNITY

## 2021-06-17 LAB
ALBUMIN SERPL ELPH-MCNC: 3.7 G/DL (ref 2.9–4.4)
ALBUMIN/GLOB SERPL: 1 {RATIO} (ref 0.7–1.7)
ALPHA1 GLOB SERPL ELPH-MCNC: 0.3 G/DL (ref 0–0.4)
ALPHA2 GLOB SERPL ELPH-MCNC: 1.4 G/DL (ref 0.4–1)
B-GLOBULIN SERPL ELPH-MCNC: 1.3 G/DL (ref 0.7–1.3)
FOLATE SERPL-MCNC: 5.74 NG/ML (ref 4.78–24.2)
GAMMA GLOB SERPL ELPH-MCNC: 0.7 G/DL (ref 0.4–1.8)
GLOBULIN SER CALC-MCNC: 3.6 G/DL (ref 2.2–3.9)
HAPTOGLOB SERPL-MCNC: 428 MG/DL (ref 30–200)
LABORATORY COMMENT REPORT: ABNORMAL
M PROTEIN SERPL ELPH-MCNC: ABNORMAL G/DL
PROT PATTERN SERPL ELPH-IMP: ABNORMAL
PROT SERPL-MCNC: 7.3 G/DL (ref 6–8.5)
VIT B12 BLD-MCNC: 671 PG/ML (ref 211–946)

## 2021-06-18 ENCOUNTER — TELEPHONE (OUTPATIENT)
Dept: FAMILY MEDICINE CLINIC | Facility: CLINIC | Age: 51
End: 2021-06-18

## 2021-06-18 ENCOUNTER — TELEPHONE (OUTPATIENT)
Dept: ONCOLOGY | Facility: CLINIC | Age: 51
End: 2021-06-18

## 2021-06-18 PROBLEM — K90.9 MALABSORPTION OF IRON: Status: ACTIVE | Noted: 2021-06-18

## 2021-06-18 PROBLEM — T45.4X5A ADVERSE EFFECT OF IRON: Status: ACTIVE | Noted: 2021-06-18

## 2021-06-18 NOTE — TELEPHONE ENCOUNTER
----- Message from Enma Powers MD sent at 6/18/2021  3:21 PM EDT -----  She has iron deficiency.  Go ahead and schedule patient for IV Injectafer 750 mg D1 and D8.  She has poor p.o. oral iron tolerance.Also begin folic acid 1 mg p.o. daily #30 with 2 refills for total of 3 months treatments.She should keep her appointment as discussed.

## 2021-06-18 NOTE — TELEPHONE ENCOUNTER
Left message on identifying VM to let pt know that she has iron deficiency and that Dr. Powers has ordered Injectafer D1 and D8 and folic acid daily for 3 months. Callback number left. Folic acid prescription called into pt's pharmacy.

## 2021-06-18 NOTE — TELEPHONE ENCOUNTER
HUB TO SHARE - Left message for patient to call the office to schedule her 3 month f/u for around August 17th with labs the week before.  hailee

## 2021-06-30 ENCOUNTER — TELEPHONE (OUTPATIENT)
Dept: ONCOLOGY | Facility: CLINIC | Age: 51
End: 2021-06-30

## 2021-06-30 NOTE — TELEPHONE ENCOUNTER
Caller: ANTONIETA    Relationship: PATIENT    Best call back number: [phone]    What is the best time to reach you: ANYTIME    What was the call regarding: SHE HAS BEEN HAVING ISSUES WITH HER INSURANCE, SO SHE WAS NOT ABLE TO START ON THE INSULIN INJECTIONS. FOR THAT REASON, SHE'D LIKE TO KNOW IF SHE SHOULD PUSH OUT HER 07/01 APPT OR IF SHE SHOULD KEEP IT.    Do you require a callback: YES

## 2021-06-30 NOTE — TELEPHONE ENCOUNTER
Pt states that she lost her job and is in the process of filling for [employer] insurance. She was told that it would be effective with 7-10days.  She would like to postpone the f/u for a couple weeks to ensure this happens.  She will also call us the day it is effective to get the iron infusion. Message sent to Dr. Powers and her medical staff.

## 2021-07-16 RX ORDER — HYDROXYZINE HYDROCHLORIDE 25 MG/1
TABLET, FILM COATED ORAL
Qty: 30 TABLET | Refills: 0 | Status: SHIPPED | OUTPATIENT
Start: 2021-07-16 | End: 2021-08-02

## 2021-07-21 ENCOUNTER — TELEPHONE (OUTPATIENT)
Dept: ONCOLOGY | Facility: CLINIC | Age: 51
End: 2021-07-21

## 2021-07-21 NOTE — TELEPHONE ENCOUNTER
Caller: ANTONIETA     Relationship to patient: SELF     Best call back number: [phone]    PATIENT JUST GOT HER INSURANCE STRAIGHTENED OUT SO SHE WOULD LIKE TO R/S HER IRON INFUSION ASAP AND THEN A FOLLOW UP WITH DR. WALKER.   PLEASE CALL AND ADVISE   THANK YOU

## 2021-07-22 ENCOUNTER — TELEMEDICINE (OUTPATIENT)
Dept: FAMILY MEDICINE CLINIC | Facility: CLINIC | Age: 51
End: 2021-07-22

## 2021-07-22 ENCOUNTER — TELEPHONE (OUTPATIENT)
Dept: ONCOLOGY | Facility: CLINIC | Age: 51
End: 2021-07-22

## 2021-07-22 DIAGNOSIS — R10.9 ACUTE ABDOMINAL PAIN: Primary | ICD-10-CM

## 2021-07-22 PROCEDURE — 99213 OFFICE O/P EST LOW 20 MIN: CPT | Performed by: NURSE PRACTITIONER

## 2021-07-22 RX ORDER — CIPROFLOXACIN 500 MG/1
500 TABLET, FILM COATED ORAL 2 TIMES DAILY
Qty: 20 TABLET | Refills: 0 | Status: SHIPPED | OUTPATIENT
Start: 2021-07-22 | End: 2021-08-17

## 2021-07-22 NOTE — ASSESSMENT & PLAN NOTE
1.  Start Cipro 500 mg twice daily x10 days  2.  Order CT abdomen pelvis  3.  Patient to go to ER with worsening pain  4.  Advised to follow-up in office within 48 hours to reassess and check labs

## 2021-07-22 NOTE — PROGRESS NOTES
Chief Complaint  Abdominal Pain    Subjective          Rosita APPLE Siegel presents to [facility]  History of Present Illness    You have chosen to receive care through a telemedicine visit. Do you consent to use a telemedicine visit for your medical care today? Yes    Patient started with LLQ pain/bloating that started yesterday, rates pain 5/10. She reports fever of 102 degrees. Patient had diarrhea this AM, without blood or mucus. She does have a history of IBS and Diverticulitis, last flare of Diverticulitis approximately one year ago. Patient reports she was not feeling well enough to come into office.  Patient has some nausea, denies vomiting.    Objective   Vital Signs:   There were no vitals taken for this visit.      Physical Exam  Pulmonary:      Effort: Pulmonary effort is normal.   Neurological:      Mental Status: She is alert and oriented to person, place, and time.        Result Review :                 Assessment and Plan    Diagnoses and all orders for this visit:    1. Acute abdominal pain (Primary)  Assessment & Plan:  1.  Start Cipro 500 mg twice daily x10 days  2.  Order CT abdomen pelvis  3.  Patient to go to ER with worsening pain  4.  Advised to follow-up in office within 48 hours to reassess and check labs    Orders:  -     CT Abdomen Pelvis With Contrast; Future    Other orders  -     ciprofloxacin (Cipro) 500 MG tablet; Take 1 tablet by mouth 2 (Two) Times a Day.  Dispense: 20 tablet; Refill: 0    I spent 20 minutes caring for Rosita on this date of service. This time includes time spent by me in the following activities:preparing for the visit, reviewing tests, obtaining and/or reviewing a separately obtained history, counseling and educating the patient/family/caregiver, ordering medications, tests, or procedures, documenting information in the medical record and care coordination  Follow Up   Return in about 2 days (around 7/24/2021).  Patient was given  instructions and counseling regarding her condition or for health maintenance advice. Please see specific information pulled into the AVS if appropriate.     This was an audio and video enabled telemedicine encounter.

## 2021-07-27 ENCOUNTER — TELEPHONE (OUTPATIENT)
Dept: FAMILY MEDICINE CLINIC | Facility: CLINIC | Age: 51
End: 2021-07-27

## 2021-07-27 NOTE — TELEPHONE ENCOUNTER
Pt called and wanted to let you know that she has improved some with her pain and is feeling overall better. She is asking for an off work slip for yesterday and today. She does plan to return to work tomorrow since her pain has improved. We have been having issues with her insurance on getting the CT scan approved, but she has called and they are in communication with the patient and myself via email. They are working to get a status update today so hopefully she will be able to get scheduled for the CT soon. She still wants to have this done even thought she has somewhat improved. She said she has doubled up on her acid reflux medication as well because that seems to be bothering her more with all of this too. Is it ok to do an off work slip for yesterday and today for her?

## 2021-07-30 RX ORDER — HYOSCYAMINE SULFATE 0.12 MG/1
0.12 TABLET SUBLINGUAL EVERY 4 HOURS PRN
Qty: 90 EACH | Refills: 0 | Status: SHIPPED | OUTPATIENT
Start: 2021-07-30

## 2021-08-02 RX ORDER — HYDROXYZINE HYDROCHLORIDE 25 MG/1
TABLET, FILM COATED ORAL
Qty: 30 TABLET | Refills: 0 | Status: SHIPPED | OUTPATIENT
Start: 2021-08-02 | End: 2022-01-14 | Stop reason: SDUPTHER

## 2021-08-02 RX ORDER — ESOMEPRAZOLE MAGNESIUM 40 MG/1
40 CAPSULE, DELAYED RELEASE ORAL
Qty: 90 CAPSULE | Refills: 1 | Status: SHIPPED | OUTPATIENT
Start: 2021-08-02 | End: 2021-10-20 | Stop reason: SDUPTHER

## 2021-08-05 ENCOUNTER — TELEPHONE (OUTPATIENT)
Dept: ONCOLOGY | Facility: HOSPITAL | Age: 51
End: 2021-08-05

## 2021-08-05 NOTE — TELEPHONE ENCOUNTER
Spoke with patient in regards to appt time. She asked to be switched to 730 rather than  830 so she could get to work on time. Patient verbalized understanding.

## 2021-08-06 ENCOUNTER — TELEPHONE (OUTPATIENT)
Dept: ONCOLOGY | Facility: CLINIC | Age: 51
End: 2021-08-06

## 2021-08-06 ENCOUNTER — TELEPHONE (OUTPATIENT)
Dept: ONCOLOGY | Facility: HOSPITAL | Age: 51
End: 2021-08-06

## 2021-08-06 NOTE — TELEPHONE ENCOUNTER
Pt. Has not shown up for her iron infusion appointment.   I called pt., no answer at this time. I left a message and contact number for pt. To call and reschedule her infusion appointment.

## 2021-08-10 ENCOUNTER — LAB (OUTPATIENT)
Dept: FAMILY MEDICINE CLINIC | Facility: CLINIC | Age: 51
End: 2021-08-10

## 2021-08-10 ENCOUNTER — TELEPHONE (OUTPATIENT)
Dept: ONCOLOGY | Facility: OTHER | Age: 51
End: 2021-08-10

## 2021-08-10 DIAGNOSIS — E11.65 CONTROLLED TYPE 2 DIABETES MELLITUS WITH HYPERGLYCEMIA, WITHOUT LONG-TERM CURRENT USE OF INSULIN (HCC): ICD-10-CM

## 2021-08-10 DIAGNOSIS — E53.8 VITAMIN B12 DEFICIENCY: ICD-10-CM

## 2021-08-10 DIAGNOSIS — I10 ESSENTIAL HYPERTENSION: Primary | ICD-10-CM

## 2021-08-10 PROCEDURE — 80053 COMPREHEN METABOLIC PANEL: CPT | Performed by: NURSE PRACTITIONER

## 2021-08-10 PROCEDURE — 85027 COMPLETE CBC AUTOMATED: CPT | Performed by: NURSE PRACTITIONER

## 2021-08-10 PROCEDURE — 82607 VITAMIN B-12: CPT | Performed by: NURSE PRACTITIONER

## 2021-08-10 PROCEDURE — 83036 HEMOGLOBIN GLYCOSYLATED A1C: CPT | Performed by: NURSE PRACTITIONER

## 2021-08-10 PROCEDURE — 36415 COLL VENOUS BLD VENIPUNCTURE: CPT | Performed by: NURSE PRACTITIONER

## 2021-08-10 PROCEDURE — 80061 LIPID PANEL: CPT | Performed by: NURSE PRACTITIONER

## 2021-08-10 NOTE — TELEPHONE ENCOUNTER
PATIENT CONFIRMED INFUSION FOR 8/13/21 BUT CANCELLED FU AS THIS WILL BE HER FIRST INFUSION APPT AND SHE STATES SHE IS TO FOLLOW-UP WITH DR. WALKER AFTER HER SECOND INFUSION.     SHE WILL RESCHEDULE ON 8/13/21 WHEN IN OFFICE FOR HER FIRST INFUSION.     TRAVEL SCREEN NEGATIVE.

## 2021-08-11 LAB
ALBUMIN SERPL-MCNC: 4.1 G/DL (ref 3.5–5.2)
ALBUMIN/GLOB SERPL: 1.3 G/DL
ALP SERPL-CCNC: 93 U/L (ref 39–117)
ALT SERPL W P-5'-P-CCNC: 21 U/L (ref 1–33)
ANION GAP SERPL CALCULATED.3IONS-SCNC: 18.8 MMOL/L (ref 5–15)
AST SERPL-CCNC: 29 U/L (ref 1–32)
BILIRUB SERPL-MCNC: 0.2 MG/DL (ref 0–1.2)
BUN SERPL-MCNC: 15 MG/DL (ref 6–20)
BUN/CREAT SERPL: 12.4 (ref 7–25)
CALCIUM SPEC-SCNC: 8.6 MG/DL (ref 8.6–10.5)
CHLORIDE SERPL-SCNC: 97 MMOL/L (ref 98–107)
CHOLEST SERPL-MCNC: 150 MG/DL (ref 0–200)
CO2 SERPL-SCNC: 23.2 MMOL/L (ref 22–29)
CREAT SERPL-MCNC: 1.21 MG/DL (ref 0.57–1)
DEPRECATED RDW RBC AUTO: 42.9 FL (ref 37–54)
ERYTHROCYTE [DISTWIDTH] IN BLOOD BY AUTOMATED COUNT: 16.9 % (ref 12.3–15.4)
GFR SERPL CREATININE-BSD FRML MDRD: 47 ML/MIN/1.73
GLOBULIN UR ELPH-MCNC: 3.1 GM/DL
GLUCOSE SERPL-MCNC: 156 MG/DL (ref 65–99)
HBA1C MFR BLD: 7.7 % (ref 3.5–5.6)
HCT VFR BLD AUTO: 31.5 % (ref 34–46.6)
HDLC SERPL-MCNC: 38 MG/DL (ref 40–60)
HGB BLD-MCNC: 9.7 G/DL (ref 12–15.9)
LDLC SERPL CALC-MCNC: 66 MG/DL (ref 0–100)
LDLC/HDLC SERPL: 1.45 {RATIO}
MCH RBC QN AUTO: 21.9 PG (ref 26.6–33)
MCHC RBC AUTO-ENTMCNC: 30.8 G/DL (ref 31.5–35.7)
MCV RBC AUTO: 71.1 FL (ref 79–97)
PLATELET # BLD AUTO: 496 10*3/MM3 (ref 140–450)
PMV BLD AUTO: 10.6 FL (ref 6–12)
POTASSIUM SERPL-SCNC: 3.4 MMOL/L (ref 3.5–5.2)
PROT SERPL-MCNC: 7.2 G/DL (ref 6–8.5)
RBC # BLD AUTO: 4.43 10*6/MM3 (ref 3.77–5.28)
SODIUM SERPL-SCNC: 139 MMOL/L (ref 136–145)
TRIGL SERPL-MCNC: 285 MG/DL (ref 0–150)
VIT B12 BLD-MCNC: 508 PG/ML (ref 211–946)
VLDLC SERPL-MCNC: 46 MG/DL (ref 5–40)
WBC # BLD AUTO: 9.6 10*3/MM3 (ref 3.4–10.8)

## 2021-08-13 ENCOUNTER — HOSPITAL ENCOUNTER (OUTPATIENT)
Dept: ONCOLOGY | Facility: HOSPITAL | Age: 51
Setting detail: INFUSION SERIES
Discharge: HOME | End: 2021-08-13

## 2021-08-13 VITALS
RESPIRATION RATE: 18 BRPM | HEIGHT: 67 IN | DIASTOLIC BLOOD PRESSURE: 76 MMHG | SYSTOLIC BLOOD PRESSURE: 116 MMHG | BODY MASS INDEX: 39.08 KG/M2 | HEART RATE: 67 BPM | WEIGHT: 249 LBS

## 2021-08-13 DIAGNOSIS — K90.9 MALABSORPTION OF IRON: Primary | ICD-10-CM

## 2021-08-13 DIAGNOSIS — T45.4X5A ADVERSE EFFECT OF IRON, INITIAL ENCOUNTER: ICD-10-CM

## 2021-08-13 DIAGNOSIS — D50.9 IRON DEFICIENCY ANEMIA, UNSPECIFIED IRON DEFICIENCY ANEMIA TYPE: ICD-10-CM

## 2021-08-13 PROCEDURE — 25010000002 FERRIC CARBOXYMALTOSE 750 MG/15ML SOLUTION 15 ML VIAL: Performed by: INTERNAL MEDICINE

## 2021-08-13 PROCEDURE — 36415 COLL VENOUS BLD VENIPUNCTURE: CPT

## 2021-08-13 PROCEDURE — 96365 THER/PROPH/DIAG IV INF INIT: CPT

## 2021-08-13 RX ORDER — SODIUM CHLORIDE 9 MG/ML
250 INJECTION, SOLUTION INTRAVENOUS ONCE
Status: COMPLETED | OUTPATIENT
Start: 2021-08-13 | End: 2021-08-13

## 2021-08-13 RX ADMIN — SODIUM CHLORIDE 250 ML: 9 INJECTION, SOLUTION INTRAVENOUS at 08:00

## 2021-08-13 RX ADMIN — FERRIC CARBOXYMALTOSE INJECTION 750 MG: 50 INJECTION, SOLUTION INTRAVENOUS at 08:00

## 2021-08-13 NOTE — PROGRESS NOTES
Patient here for Injectafer patient has no complaints and tolerated infusion well, patient given AVS for next appt.

## 2021-08-17 ENCOUNTER — OFFICE VISIT (OUTPATIENT)
Dept: FAMILY MEDICINE CLINIC | Facility: CLINIC | Age: 51
End: 2021-08-17

## 2021-08-17 VITALS
SYSTOLIC BLOOD PRESSURE: 130 MMHG | DIASTOLIC BLOOD PRESSURE: 85 MMHG | HEIGHT: 67 IN | OXYGEN SATURATION: 99 % | BODY MASS INDEX: 39.08 KG/M2 | WEIGHT: 249 LBS | HEART RATE: 96 BPM

## 2021-08-17 DIAGNOSIS — E11.65 CONTROLLED TYPE 2 DIABETES MELLITUS WITH HYPERGLYCEMIA, WITHOUT LONG-TERM CURRENT USE OF INSULIN (HCC): ICD-10-CM

## 2021-08-17 DIAGNOSIS — K21.9 GASTROESOPHAGEAL REFLUX DISEASE WITHOUT ESOPHAGITIS: ICD-10-CM

## 2021-08-17 DIAGNOSIS — E78.2 MIXED HYPERLIPIDEMIA: ICD-10-CM

## 2021-08-17 DIAGNOSIS — F33.1 MODERATE EPISODE OF RECURRENT MAJOR DEPRESSIVE DISORDER (HCC): ICD-10-CM

## 2021-08-17 DIAGNOSIS — F41.9 ANXIETY: ICD-10-CM

## 2021-08-17 DIAGNOSIS — E53.8 VITAMIN B12 DEFICIENCY: ICD-10-CM

## 2021-08-17 DIAGNOSIS — I10 ESSENTIAL HYPERTENSION: Primary | ICD-10-CM

## 2021-08-17 DIAGNOSIS — D50.9 IRON DEFICIENCY ANEMIA, UNSPECIFIED IRON DEFICIENCY ANEMIA TYPE: ICD-10-CM

## 2021-08-17 PROCEDURE — 99214 OFFICE O/P EST MOD 30 MIN: CPT | Performed by: NURSE PRACTITIONER

## 2021-08-17 RX ORDER — FENOFIBRATE 145 MG/1
145 TABLET, COATED ORAL DAILY
Qty: 90 TABLET | Refills: 1 | Status: SHIPPED | OUTPATIENT
Start: 2021-08-17 | End: 2022-02-22

## 2021-08-17 NOTE — PROGRESS NOTES
Chief Complaint  Follow-up (3 month follow up DM, HTN)    Subjective          Rosita Siegel presents to [facility] for   History of Present Illness    Diabetes mellitus type II, feels stable on meds, denies any signs/symptoms of hyper/hypoglycemia, blurry vision, polydipsia, polyuria, nocturia, and unintentional weight loss    Hyperlipidemia, The patient denies muscle aches, constipation, diarrhea, GI upset, fatigue, chest pain/pressure, exercise intolerance, dyspnea, palpitations, syncope and pedal edema.      HTN, stable on meds and takes as directed, denies chest pain, headache, shortness of air, palpitations and swelling of extremities.     RAVINDRA, now following with heme and on iron infusions, overall feels much better with improved energy and mood    Depression, follows with SALENA Arora, feels stable on lamictal and pristiq, feeling well, clear mind, denies significant weight loss/gain, insomnia, hypersomnia, psychomotor agitation, psychomotor retardation, fatigue (loss of energy), feelings of worthlessness (guilt), impaired concentration (indecisiveness), thoughts of death or suicide.       Gastroesophageal reflux disease, s/s improved on nexium instead of omeprazole, Completed abx for abd pain/diverticulitis, on probiotic daily, stable on medication, denies nausea, vomiting, constipation, abdominal pain and diarrhea.     Here to review labs      The following portions of the patient's history were reviewed and updated as appropriate: allergies, current medications, past family history, past medical history, past social history, past surgical history and problem list.    Past Medical History:   Diagnosis Date   • Allergies    • Depression    • Diabetes mellitus (CMS/HCC)    • GERD (gastroesophageal reflux disease)    • Hyperlipidemia    • Hypertension    • Pneumonia      Past Surgical History:   Procedure Laterality Date   • COLONOSCOPY       Family History   Problem Relation  Age of Onset   • Heart failure Mother    • Cancer Father    • Heart failure Father    • Ovarian cancer Maternal Grandmother      Social History     Tobacco Use   • Smoking status: Never Smoker   • Smokeless tobacco: Never Used   Substance Use Topics   • Alcohol use: No       Current Outpatient Medications:   •  albuterol sulfate  (90 Base) MCG/ACT inhaler, Inhale 2 puffs Every 4 (Four) Hours As Needed for Wheezing or Shortness of Air., Disp: 1 inhaler, Rfl: 0  •  Ascorbic Acid (VITAMIN C PO), Take  by mouth., Disp: , Rfl:   •  atorvastatin (LIPITOR) 40 MG tablet, Take 1 tablet by mouth Every Evening., Disp: 90 tablet, Rfl: 3  •  Blood Glucose Monitoring Suppl (OneTouch Verio Flex System) w/Device kit, 1 each Take As Directed. To check blood glucose BID, Disp: 1 kit, Rfl: 0  •  Cetirizine HCl (ZYRTEC ALLERGY) 10 MG capsule, ZYRTEC ALLERGY 10 MG CAPS, Disp: , Rfl:   •  desvenlafaxine (Pristiq) 100 MG 24 hr tablet, Take 100 mg by mouth Daily., Disp: , Rfl:   •  desvenlafaxine (PRISTIQ) 50 MG 24 hr tablet, Take 1 tablet by mouth Daily., Disp: 90 tablet, Rfl: 1  •  esomeprazole (nexIUM) 40 MG capsule, Take 1 capsule by mouth Every Morning Before Breakfast., Disp: 90 capsule, Rfl: 1  •  ferrous sulfate 325 (65 FE) MG tablet, Take 1 tablet by mouth Daily With Breakfast., Disp: 90 tablet, Rfl: 1  •  glucose blood (OneTouch Verio) test strip, Use onetouch verio flex strips as instructed to check to check blood glucose BID, Disp: 100 each, Rfl: 12  •  hydrALAZINE (APRESOLINE) 25 MG tablet, Take 1 tablet by mouth 2 (Two) Times a Day., Disp: 180 tablet, Rfl: 1  •  hydroCHLOROthiazide (HYDRODIURIL) 25 MG tablet, Take 1 tablet by mouth Daily., Disp: 90 tablet, Rfl: 1  •  hydrOXYzine (ATARAX) 25 MG tablet, TAKE ONE TABLET BY MOUTH TWICE A DAY AS NEEDED FOR ANXIETY, Disp: 30 tablet, Rfl: 0  •  Hyoscyamine Sulfate SL (Levsin/SL) 0.125 MG sublingual tablet, Place 0.125 mg under the tongue Every 4 (Four) Hours As Needed  "(diarrhea)., Disp: 90 each, Rfl: 0  •  insulin degludec (TRESIBA FLEXTOUCH) 100 UNIT/ML solution pen-injector injection, Inject 25 Units under the skin into the appropriate area as directed Daily., Disp: 3 pen, Rfl: 1  •  lamoTRIgine (LaMICtal) 100 MG tablet, Take 1 tablet by mouth Daily., Disp: 90 tablet, Rfl: 0  •  losartan (COZAAR) 100 MG tablet, Take 1 tablet by mouth Daily., Disp: 90 tablet, Rfl: 1  •  MAGNESIUM PO, Take  by mouth., Disp: , Rfl:   •  metFORMIN ER (GLUCOPHAGE-XR) 500 MG 24 hr tablet, Take 1 tablet by mouth 2 (Two) Times a Day., Disp: 180 tablet, Rfl: 1  •  metoprolol tartrate (LOPRESSOR) 50 MG tablet, Take 1 tablet by mouth 2 (Two) Times a Day., Disp: 180 tablet, Rfl: 1  •  pioglitazone (Actos) 30 MG tablet, Take 1 tablet by mouth Daily., Disp: 90 tablet, Rfl: 1  •  POTASSIUM PO, Take  by mouth., Disp: , Rfl:   •  fenofibrate (TRICOR) 145 MG tablet, Take 1 tablet by mouth Daily., Disp: 90 tablet, Rfl: 1    Objective   Vital Signs:   /85 (BP Location: Left arm, Patient Position: Sitting, Cuff Size: Large Adult)   Pulse 96   Ht 170.2 cm (67\")   Wt 113 kg (249 lb)   SpO2 99%   BMI 39.00 kg/m²       Physical Exam  Vitals and nursing note reviewed.   Constitutional:       General: She is not in acute distress.     Appearance: She is well-developed. She is obese. She is not diaphoretic.   Eyes:      Pupils: Pupils are equal, round, and reactive to light.   Neck:      Thyroid: No thyromegaly.      Vascular: No JVD.   Cardiovascular:      Rate and Rhythm: Normal rate and regular rhythm.      Heart sounds: Normal heart sounds. No murmur heard.     Pulmonary:      Effort: Pulmonary effort is normal. No respiratory distress.      Breath sounds: Normal breath sounds.   Abdominal:      General: Bowel sounds are normal. There is no distension.      Palpations: Abdomen is soft.      Tenderness: There is no abdominal tenderness.   Musculoskeletal:         General: No tenderness. Normal range of " motion.      Cervical back: Normal range of motion and neck supple.   Skin:     General: Skin is warm and dry.   Neurological:      Mental Status: She is alert and oriented to person, place, and time.      Sensory: No sensory deficit.   Psychiatric:         Behavior: Behavior normal.         Thought Content: Thought content normal.         Judgment: Judgment normal.          Result Review :     No visits with results within 7 Day(s) from this visit.   Latest known visit with results is:   Lab on 08/10/2021   Component Date Value Ref Range Status   • WBC 08/10/2021 9.60  3.40 - 10.80 10*3/mm3 Final   • RBC 08/10/2021 4.43  3.77 - 5.28 10*6/mm3 Final   • Hemoglobin 08/10/2021 9.7* 12.0 - 15.9 g/dL Final   • Hematocrit 08/10/2021 31.5* 34.0 - 46.6 % Final   • MCV 08/10/2021 71.1* 79.0 - 97.0 fL Final   • MCH 08/10/2021 21.9* 26.6 - 33.0 pg Final   • MCHC 08/10/2021 30.8* 31.5 - 35.7 g/dL Final   • RDW 08/10/2021 16.9* 12.3 - 15.4 % Final   • RDW-SD 08/10/2021 42.9  37.0 - 54.0 fl Final   • MPV 08/10/2021 10.6  6.0 - 12.0 fL Final   • Platelets 08/10/2021 496* 140 - 450 10*3/mm3 Final   • Glucose 08/10/2021 156* 65 - 99 mg/dL Final   • BUN 08/10/2021 15  6 - 20 mg/dL Final   • Creatinine 08/10/2021 1.21* 0.57 - 1.00 mg/dL Final   • Sodium 08/10/2021 139  136 - 145 mmol/L Final   • Potassium 08/10/2021 3.4* 3.5 - 5.2 mmol/L Final   • Chloride 08/10/2021 97* 98 - 107 mmol/L Final   • CO2 08/10/2021 23.2  22.0 - 29.0 mmol/L Final   • Calcium 08/10/2021 8.6  8.6 - 10.5 mg/dL Final   • Total Protein 08/10/2021 7.2  6.0 - 8.5 g/dL Final   • Albumin 08/10/2021 4.10  3.50 - 5.20 g/dL Final   • ALT (SGPT) 08/10/2021 21  1 - 33 U/L Final   • AST (SGOT) 08/10/2021 29  1 - 32 U/L Final   • Alkaline Phosphatase 08/10/2021 93  39 - 117 U/L Final   • Total Bilirubin 08/10/2021 0.2  0.0 - 1.2 mg/dL Final   • eGFR Non African Amer 08/10/2021 47* >60 mL/min/1.73 Final   • Globulin 08/10/2021 3.1  gm/dL Final   • A/G Ratio 08/10/2021 1.3   g/dL Final   • BUN/Creatinine Ratio 08/10/2021 12.4  7.0 - 25.0 Final   • Anion Gap 08/10/2021 18.8* 5.0 - 15.0 mmol/L Final   • Total Cholesterol 08/10/2021 150  0 - 200 mg/dL Final   • Triglycerides 08/10/2021 285* 0 - 150 mg/dL Final   • HDL Cholesterol 08/10/2021 38* 40 - 60 mg/dL Final   • LDL Cholesterol  08/10/2021 66  0 - 100 mg/dL Final   • VLDL Cholesterol 08/10/2021 46* 5 - 40 mg/dL Final   • LDL/HDL Ratio 08/10/2021 1.45   Final   • Hemoglobin A1C 08/10/2021 7.7* 3.5 - 5.6 % Final   • Vitamin B-12 08/10/2021 508  211 - 946 pg/mL Final                       Assessment and Plan    Diagnoses and all orders for this visit:    1. Essential hypertension (Primary)  Comments:  bp stable, cont to monitor. rec HHD/DM, weight loss.     2. Controlled type 2 diabetes mellitus with hyperglycemia, without long-term current use of insulin (CMS/[facility])  Comments:  A1c improved to 7.7, increase tresiba to 30 u QHS and work on DM diet, exercise. provided samples today and ok in future when available.     3. Vitamin B12 deficiency    4. Iron deficiency anemia, unspecified iron deficiency anemia type  Comments:  cont with heme and infusions, cond improving.     5. Mixed hyperlipidemia  Comments:  cont statin, d/c fishoil d/t nausea/GI upset, trial fenofibrate daily. recheck in 3mo    6. Moderate episode of recurrent major depressive disorder (CMS/[facility])  Comments:  keep f/u with SALENA Arora. cont lamictal and pristiq, s/s improving.     7. Anxiety    8. Gastroesophageal reflux disease without esophagitis  Comments:  improved on nexium    Other orders  -     fenofibrate (TRICOR) 145 MG tablet; Take 1 tablet by mouth Daily.  Dispense: 90 tablet; Refill: 1        I spent 32 minutes caring for Rosita Siegel on this date of service. This time includes time spent by me in the following activities: preparing for the visit, reviewing tests, performing a medically appropriate examination and/or evaluation , counseling and educating the  patient/family/caregiver, ordering medications, tests, or procedures and documenting information in the medical record        Follow Up     Return in about 3 months (around 11/17/2021) for DM, lipids, iron def, DM panel prior to appt.  Patient was given instructions and counseling regarding her condition or for health maintenance advice. Please see specific information pulled into the AVS if appropriate.      EMR Dragon transcription disclaimer:  Some of this encounter note is an electronic transcription translation of spoken language to printed text. The electronic translation of spoken language may permit erroneous, or at times, nonsensical words or phrases to be inadvertently transcribed; Although I have reviewed the note for such errors some may still exist.

## 2021-08-19 ENCOUNTER — TELEPHONE (OUTPATIENT)
Dept: ONCOLOGY | Facility: HOSPITAL | Age: 51
End: 2021-08-19

## 2021-08-19 NOTE — TELEPHONE ENCOUNTER
Called pt to let her know about our no visitor policy that is now in effect due to covid 19. She v/u.

## 2021-08-20 ENCOUNTER — HOSPITAL ENCOUNTER (OUTPATIENT)
Dept: ONCOLOGY | Facility: HOSPITAL | Age: 51
Setting detail: INFUSION SERIES
Discharge: HOME | End: 2021-08-20

## 2021-08-20 ENCOUNTER — TELEPHONE (OUTPATIENT)
Dept: ONCOLOGY | Facility: CLINIC | Age: 51
End: 2021-08-20

## 2021-08-20 VITALS
DIASTOLIC BLOOD PRESSURE: 76 MMHG | WEIGHT: 248 LBS | BODY MASS INDEX: 38.84 KG/M2 | SYSTOLIC BLOOD PRESSURE: 108 MMHG | HEART RATE: 73 BPM | TEMPERATURE: 96.8 F

## 2021-08-20 DIAGNOSIS — D50.9 IRON DEFICIENCY ANEMIA, UNSPECIFIED IRON DEFICIENCY ANEMIA TYPE: ICD-10-CM

## 2021-08-20 DIAGNOSIS — T45.4X5A ADVERSE EFFECT OF IRON, INITIAL ENCOUNTER: ICD-10-CM

## 2021-08-20 DIAGNOSIS — K90.9 MALABSORPTION OF IRON: Primary | ICD-10-CM

## 2021-08-20 PROCEDURE — 25010000002 FERRIC CARBOXYMALTOSE 750 MG/15ML SOLUTION 15 ML VIAL: Performed by: INTERNAL MEDICINE

## 2021-08-20 PROCEDURE — 96365 THER/PROPH/DIAG IV INF INIT: CPT

## 2021-08-20 PROCEDURE — 36415 COLL VENOUS BLD VENIPUNCTURE: CPT

## 2021-08-20 RX ORDER — SODIUM CHLORIDE 9 MG/ML
250 INJECTION, SOLUTION INTRAVENOUS ONCE
Status: COMPLETED | OUTPATIENT
Start: 2021-08-20 | End: 2021-08-20

## 2021-08-20 RX ADMIN — SODIUM CHLORIDE 250 ML: 9 INJECTION, SOLUTION INTRAVENOUS at 08:03

## 2021-08-20 RX ADMIN — FERRIC CARBOXYMALTOSE INJECTION 750 MG: 50 INJECTION, SOLUTION INTRAVENOUS at 08:04

## 2021-08-20 NOTE — TELEPHONE ENCOUNTER
Discussed an Injectafer copay card with patient.  Patient gave permission to enroll into the program.  A copay card was activated for the patient today.

## 2021-08-20 NOTE — PROGRESS NOTES
Pt to the clinic for Injectafer. IV access obtained. Treatment given and tolerated well. EVS given to pt prior to discharge.

## 2021-09-01 RX ORDER — CIPROFLOXACIN 500 MG/1
500 TABLET, FILM COATED ORAL 2 TIMES DAILY
Qty: 20 TABLET | Refills: 0 | Status: SHIPPED | OUTPATIENT
Start: 2021-09-01 | End: 2021-10-20

## 2021-09-15 ENCOUNTER — LAB (OUTPATIENT)
Dept: LAB | Facility: HOSPITAL | Age: 51
End: 2021-09-15

## 2021-09-15 ENCOUNTER — OFFICE VISIT (OUTPATIENT)
Dept: ONCOLOGY | Facility: CLINIC | Age: 51
End: 2021-09-15

## 2021-09-15 VITALS
HEART RATE: 88 BPM | SYSTOLIC BLOOD PRESSURE: 118 MMHG | TEMPERATURE: 97.3 F | DIASTOLIC BLOOD PRESSURE: 75 MMHG | RESPIRATION RATE: 18 BRPM | HEIGHT: 67 IN | BODY MASS INDEX: 38.77 KG/M2 | WEIGHT: 247 LBS

## 2021-09-15 DIAGNOSIS — T45.4X5A ADVERSE EFFECT OF IRON, INITIAL ENCOUNTER: ICD-10-CM

## 2021-09-15 DIAGNOSIS — K90.9 MALABSORPTION OF IRON: Primary | ICD-10-CM

## 2021-09-15 DIAGNOSIS — D75.839 THROMBOCYTOSIS: ICD-10-CM

## 2021-09-15 DIAGNOSIS — D50.9 IRON DEFICIENCY ANEMIA, UNSPECIFIED IRON DEFICIENCY ANEMIA TYPE: ICD-10-CM

## 2021-09-15 DIAGNOSIS — D64.9 ANEMIA, UNSPECIFIED TYPE: ICD-10-CM

## 2021-09-15 DIAGNOSIS — K90.9 MALABSORPTION OF IRON: ICD-10-CM

## 2021-09-15 LAB
BASOPHILS # BLD AUTO: 0.1 10*3/MM3 (ref 0–0.2)
BASOPHILS NFR BLD AUTO: 0.9 % (ref 0–1.5)
DEPRECATED RDW RBC AUTO: 73.1 FL (ref 37–54)
EOSINOPHIL # BLD AUTO: 0.23 10*3/MM3 (ref 0–0.4)
EOSINOPHIL NFR BLD AUTO: 2.1 % (ref 0.3–6.2)
ERYTHROCYTE [DISTWIDTH] IN BLOOD BY AUTOMATED COUNT: 25.8 % (ref 12.3–15.4)
HCT VFR BLD AUTO: 40.6 % (ref 34–46.6)
HGB BLD-MCNC: 12.5 G/DL (ref 12–15.9)
LYMPHOCYTES # BLD AUTO: 2.85 10*3/MM3 (ref 0.7–3.1)
LYMPHOCYTES NFR BLD AUTO: 25.8 % (ref 19.6–45.3)
MCH RBC QN AUTO: 25.6 PG (ref 26.6–33)
MCHC RBC AUTO-ENTMCNC: 30.8 G/DL (ref 31.5–35.7)
MCV RBC AUTO: 83 FL (ref 79–97)
MONOCYTES # BLD AUTO: 0.53 10*3/MM3 (ref 0.1–0.9)
MONOCYTES NFR BLD AUTO: 4.8 % (ref 5–12)
NEUTROPHILS NFR BLD AUTO: 66.4 % (ref 42.7–76)
NEUTROPHILS NFR BLD AUTO: 7.35 10*3/MM3 (ref 1.7–7)
PLATELET # BLD AUTO: 427 10*3/MM3 (ref 140–450)
PMV BLD AUTO: 9.6 FL (ref 6–12)
RBC # BLD AUTO: 4.89 10*6/MM3 (ref 3.77–5.28)
WBC # BLD AUTO: 11.06 10*3/MM3 (ref 3.4–10.8)

## 2021-09-15 PROCEDURE — 36415 COLL VENOUS BLD VENIPUNCTURE: CPT

## 2021-09-15 PROCEDURE — 99214 OFFICE O/P EST MOD 30 MIN: CPT | Performed by: INTERNAL MEDICINE

## 2021-09-15 PROCEDURE — 85025 COMPLETE CBC W/AUTO DIFF WBC: CPT

## 2021-09-15 NOTE — PROGRESS NOTES
Hematology/Oncology Outpatient Follow Up    PATIENT NAME:Rosita Siegel  :1970  MRN: [id number]  PRIMARY CARE PHYSICIAN: Loren Steel APRN  REFERRING PHYSICIAN: Loren Steel APRN    Chief Complaint   Patient presents with   • Follow-up     Thrombocytosis, anemia        HISTORY OF PRESENT ILLNESS:     This is a 50-year-old female who has been known to have anemia for couple years. Patient had a colonoscopy first irritable bowel syndrome more than 10 years ago. She has intermittent and explosive diarrhea. She denies GI bleed, or blood in her urine. She had Covid back in 2021 and received IVIG. She has been a longtime diabetic with diabetic complications including peripheral neuropathy which has progressively gotten worse over the past 1 year. Patient has been on baby aspirin since May 2021. Due to the anemia she was placed on oral iron tablets in May 2021.  She also has a history of menorrhagia and has been recommended to have uterine ablation. Patient currently sees Dr. Jovanna Fernandez for this evaluation.     She had CBC on 1921 white count was 10.8, hemoglobin is 10.3, MCV was low at 72, RDW was high at 15.8 and platelets where 509.  She also had a chemistry panel BUN is 19, creatinine is 1.2, ALT was high at 39 AST was 65 and alkaline phosphatase was 118.  Her hemoglobin A1c was high at 8.4.  She also had hypercholesterolemia.  She has normal thyroid function.  Today her white count is 15.3, hemoglobin is up to 11.1 she has microcytosis at 72, RDW is still high at 16.6 and platelets are down to 279.  On her differential she has mild lymphocytosis.  Patient had viral hepatitis C antibody which was negative.     Patient has referred secondary to anemia. She is accompanied today by her mother for this appointment    · 2021: Patient had anemia work-up B12 which was normal at 5 8, white count was 9.6 hemoglobin 9.7 MCV was low at 71 and platelets were 496.  BUN is 15, creatinine 1.2.   Ferritin was low at 17, TIBC was high at 554, folate was low normal at 5.7, haptoglobin is elevated to 428, reticulocyte count is normal at 1.53, SPEP with SUNIL did not reveal any monoclonal protein.  · 6/18/2021 patient was scheduled to receive IV Injectafer 750 mg D1 98 due to poor oral iron tolerance.  She was also placed on folic acid 1 mg p.o. daily for total of 3 months.  Past Medical History:   Diagnosis Date   • Allergies    • Depression    • Diabetes mellitus (CMS/HCC)    • GERD (gastroesophageal reflux disease)    • Hyperlipidemia    • Hypertension    • Pneumonia        Past Surgical History:   Procedure Laterality Date   • COLONOSCOPY           Current Outpatient Medications:   •  albuterol sulfate  (90 Base) MCG/ACT inhaler, Inhale 2 puffs Every 4 (Four) Hours As Needed for Wheezing or Shortness of Air., Disp: 1 inhaler, Rfl: 0  •  Ascorbic Acid (VITAMIN C PO), Take  by mouth., Disp: , Rfl:   •  atorvastatin (LIPITOR) 40 MG tablet, Take 1 tablet by mouth Every Evening., Disp: 90 tablet, Rfl: 3  •  Blood Glucose Monitoring Suppl (OneTouch Verio Flex System) w/Device kit, 1 each Take As Directed. To check blood glucose BID, Disp: 1 kit, Rfl: 0  •  Cetirizine HCl (ZYRTEC ALLERGY) 10 MG capsule, ZYRTEC ALLERGY 10 MG CAPS, Disp: , Rfl:   •  ciprofloxacin (Cipro) 500 MG tablet, Take 1 tablet by mouth 2 (Two) Times a Day., Disp: 20 tablet, Rfl: 0  •  desvenlafaxine (Pristiq) 100 MG 24 hr tablet, Take 100 mg by mouth Daily., Disp: , Rfl:   •  desvenlafaxine (PRISTIQ) 50 MG 24 hr tablet, Take 1 tablet by mouth Daily., Disp: 90 tablet, Rfl: 1  •  esomeprazole (nexIUM) 40 MG capsule, Take 1 capsule by mouth Every Morning Before Breakfast., Disp: 90 capsule, Rfl: 1  •  fenofibrate (TRICOR) 145 MG tablet, Take 1 tablet by mouth Daily., Disp: 90 tablet, Rfl: 1  •  ferrous sulfate 325 (65 FE) MG tablet, Take 1 tablet by mouth Daily With Breakfast., Disp: 90 tablet, Rfl: 1  •  glucose blood (OneTouch Verio) test  strip, Use onetouch verio flex strips as instructed to check to check blood glucose BID, Disp: 100 each, Rfl: 12  •  hydrALAZINE (APRESOLINE) 25 MG tablet, Take 1 tablet by mouth 2 (Two) Times a Day., Disp: 180 tablet, Rfl: 1  •  hydroCHLOROthiazide (HYDRODIURIL) 25 MG tablet, Take 1 tablet by mouth Daily., Disp: 90 tablet, Rfl: 1  •  hydrOXYzine (ATARAX) 25 MG tablet, TAKE ONE TABLET BY MOUTH TWICE A DAY AS NEEDED FOR ANXIETY, Disp: 30 tablet, Rfl: 0  •  Hyoscyamine Sulfate SL (Levsin/SL) 0.125 MG sublingual tablet, Place 0.125 mg under the tongue Every 4 (Four) Hours As Needed (diarrhea)., Disp: 90 each, Rfl: 0  •  insulin degludec (TRESIBA FLEXTOUCH) 100 UNIT/ML solution pen-injector injection, Inject 25 Units under the skin into the appropriate area as directed Daily., Disp: 3 pen, Rfl: 1  •  lamoTRIgine (LaMICtal) 100 MG tablet, Take 1 tablet by mouth Daily., Disp: 90 tablet, Rfl: 0  •  losartan (COZAAR) 100 MG tablet, Take 1 tablet by mouth Daily., Disp: 90 tablet, Rfl: 1  •  MAGNESIUM PO, Take  by mouth., Disp: , Rfl:   •  metFORMIN ER (GLUCOPHAGE-XR) 500 MG 24 hr tablet, Take 1 tablet by mouth 2 (Two) Times a Day., Disp: 180 tablet, Rfl: 1  •  metoprolol tartrate (LOPRESSOR) 50 MG tablet, Take 1 tablet by mouth 2 (Two) Times a Day., Disp: 180 tablet, Rfl: 1  •  pioglitazone (Actos) 30 MG tablet, Take 1 tablet by mouth Daily., Disp: 90 tablet, Rfl: 1  •  POTASSIUM PO, Take  by mouth., Disp: , Rfl:     Allergies   Allergen Reactions   • Cephalexin Anaphylaxis   • Erythromycin Anaphylaxis   • Lisinopril Angioedema   • Penicillin G Anaphylaxis       Family History   Problem Relation Age of Onset   • Heart failure Mother    • Cancer Father    • Heart failure Father    • Ovarian cancer Maternal Grandmother        Cancer-related family history includes Cancer in her father; Ovarian cancer in her maternal grandmother.    Social History     Tobacco Use   • Smoking status: Never Smoker   • Smokeless tobacco: Never  "Used   Substance Use Topics   • Alcohol use: No   • Drug use: No       HPI, ROS and PFSH have been reviewed and confirmed on 9/15/2021.     SUBJECTIVE:          REVIEW OF SYSTEMS:  Review of Systems   Constitutional: Negative for chills and fever.   HENT: Negative for ear pain, mouth sores, nosebleeds and sore throat.    Eyes: Negative for photophobia and visual disturbance.   Respiratory: Negative for wheezing and stridor.    Cardiovascular: Negative for chest pain and palpitations.   Gastrointestinal: Negative for abdominal pain, diarrhea, nausea and vomiting.   Endocrine: Negative for cold intolerance and heat intolerance.   Genitourinary: Negative for dysuria and hematuria.   Musculoskeletal: Negative for joint swelling and neck stiffness.   Skin: Negative for color change and rash.   Neurological: Negative for seizures and syncope.   Hematological: Negative for adenopathy.        No obvious bleeding   Psychiatric/Behavioral: Negative for agitation, confusion and hallucinations.       OBJECTIVE:    Vitals:    09/15/21 1520   BP: 118/75   Pulse: 88   Resp: 18   Temp: 97.3 °F (36.3 °C)   TempSrc: Infrared   Weight: 112 kg (247 lb)   Height: 170.2 cm (67\")   PainSc: 0-No pain     Body mass index is 38.69 kg/m².    ECOG  (0) Fully active, able to carry on all predisease performance without restriction    Physical Exam  Vitals and nursing note reviewed.   Constitutional:       General: She is not in acute distress.     Appearance: She is not diaphoretic.   HENT:      Head: Normocephalic and atraumatic.   Eyes:      General: No scleral icterus.        Right eye: No discharge.         Left eye: No discharge.      Conjunctiva/sclera: Conjunctivae normal.   Neck:      Thyroid: No thyromegaly.   Cardiovascular:      Rate and Rhythm: Normal rate and regular rhythm.      Heart sounds: Normal heart sounds. No friction rub. No gallop.    Pulmonary:      Effort: Pulmonary effort is normal. No respiratory distress.      Breath " sounds: No stridor. No wheezing.   Abdominal:      General: Bowel sounds are normal.      Palpations: Abdomen is soft. There is no mass.      Tenderness: There is no abdominal tenderness. There is no guarding or rebound.   Musculoskeletal:         General: No tenderness. Normal range of motion.      Cervical back: Normal range of motion and neck supple.   Lymphadenopathy:      Cervical: No cervical adenopathy.   Skin:     General: Skin is warm.      Findings: No erythema or rash.   Neurological:      Mental Status: She is alert and oriented to person, place, and time.      Motor: No abnormal muscle tone.   Psychiatric:         Behavior: Behavior normal.         RECENT LABS  WBC   Date Value Ref Range Status   09/15/2021 11.06 (H) 3.40 - 10.80 10*3/mm3 Final   06/15/2020 10.0 3.4 - 10.8 x10E3/uL Final     RBC   Date Value Ref Range Status   09/15/2021 4.89 3.77 - 5.28 10*6/mm3 Final   06/15/2020 4.95 3.77 - 5.28 x10E6/uL Final     Hemoglobin   Date Value Ref Range Status   09/15/2021 12.5 12.0 - 15.9 g/dL Final     Hematocrit   Date Value Ref Range Status   09/15/2021 40.6 34.0 - 46.6 % Final     MCV   Date Value Ref Range Status   09/15/2021 83.0 79.0 - 97.0 fL Final     MCH   Date Value Ref Range Status   09/15/2021 25.6 (L) 26.6 - 33.0 pg Final     MCHC   Date Value Ref Range Status   09/15/2021 30.8 (L) 31.5 - 35.7 g/dL Final     RDW   Date Value Ref Range Status   09/15/2021 25.8 (H) 12.3 - 15.4 % Final     RDW-SD   Date Value Ref Range Status   09/15/2021 73.1 (H) 37.0 - 54.0 fl Final     MPV   Date Value Ref Range Status   09/15/2021 9.6 6.0 - 12.0 fL Final     Platelets   Date Value Ref Range Status   09/15/2021 427 140 - 450 10*3/mm3 Final     Neutrophil %   Date Value Ref Range Status   09/15/2021 66.4 42.7 - 76.0 % Final     Lymphocyte %   Date Value Ref Range Status   09/15/2021 25.8 19.6 - 45.3 % Final     Monocyte %   Date Value Ref Range Status   09/15/2021 4.8 (L) 5.0 - 12.0 % Final     Eosinophil %    Date Value Ref Range Status   09/15/2021 2.1 0.3 - 6.2 % Final     Basophil %   Date Value Ref Range Status   09/15/2021 0.9 0.0 - 1.5 % Final     Neutrophils, Absolute   Date Value Ref Range Status   09/15/2021 7.35 (H) 1.70 - 7.00 10*3/mm3 Final     Lymphocytes, Absolute   Date Value Ref Range Status   09/15/2021 2.85 0.70 - 3.10 10*3/mm3 Final     Monocytes, Absolute   Date Value Ref Range Status   09/15/2021 0.53 0.10 - 0.90 10*3/mm3 Final     Eosinophils, Absolute   Date Value Ref Range Status   09/15/2021 0.23 0.00 - 0.40 10*3/mm3 Final     Basophils, Absolute   Date Value Ref Range Status   09/15/2021 0.10 0.00 - 0.20 10*3/mm3 Final     nRBC   Date Value Ref Range Status   07/20/2018 0 0 /100[WBCs] Final       Lab Results   Component Value Date    GLUCOSE 156 (H) 08/10/2021    BUN 15 08/10/2021    CREATININE 1.21 (H) 08/10/2021    EGFRIFNONA 47 (L) 08/10/2021    EGFRIFAFRI 34 (L) 06/15/2020    BCR 12.4 08/10/2021    K 3.4 (L) 08/10/2021    CO2 23.2 08/10/2021    CALCIUM 8.6 08/10/2021    PROTENTOTREF 7.3 06/16/2021    ALBUMIN 4.10 08/10/2021    LABIL2 1.0 06/16/2021    AST 29 08/10/2021    ALT 21 08/10/2021       ASSESSMENT:       1. Microcytic anemia likely secondary to iron deficiency, anemia of chronic disease. Patient has several morbidities which can lead to anemia of chronic disease including diabetes. Was placed on oral iron supplementation in May 2021 and her hemoglobin has increased. She does have problems with GI tolerance of oral iron. Currently she is taking it every other day.  2. Iron deficiency anemia  3. GI intolerance of oral iron supplements  4. Menorrhagia which likely is contributing to her iron deficiency  5. Thrombocytosis most likely reactive.  This can be seen with iron deficiency as well. With improvement in her hemoglobin, thrombocytosis has resolved based on the CBC done today.  Reviewed  6. Diabetes type 2 poorly controlled  7. Peripheral neuropathy progressive most likely due to  uncontrolled diabetes           Plans     · GI referral for iron deficiency anemia patient has an appointment with Dr. Guallpa on 10/15/2021  · Check urinalysis today for hematuria  · Follow-up with GYN for menorrhagia.  Patient has an appointment coming up with Dr. Fernandez  · She has oral iron intolerance  · We will continue to follow-up with her PCP for management of her diabetes and other medical problems.  · I will plan to see her back in 6 weeks        I have reviewed labs results, imaging, vitals, and medications with the patient today.             Patient verbalized understanding and is in agreement of the above plan.              Thank you very much allowing me to participate in the care of Rosita, I will keep you updated on her progress           I spent 30 total minutes, face-to-face, caring for Rosita today.  80% of this time involved counseling and/or coordination of care as documented within this note.

## 2021-09-30 RX ORDER — ESOMEPRAZOLE MAGNESIUM 40 MG/1
CAPSULE, DELAYED RELEASE ORAL
Qty: 90 CAPSULE | Refills: 1 | OUTPATIENT
Start: 2021-09-30

## 2021-10-15 ENCOUNTER — OFFICE (OUTPATIENT)
Dept: URBAN - METROPOLITAN AREA CLINIC 64 | Facility: CLINIC | Age: 51
End: 2021-10-15

## 2021-10-15 VITALS
DIASTOLIC BLOOD PRESSURE: 70 MMHG | SYSTOLIC BLOOD PRESSURE: 119 MMHG | HEIGHT: 66 IN | WEIGHT: 250 LBS | HEART RATE: 71 BPM

## 2021-10-15 DIAGNOSIS — K58.9 IRRITABLE BOWEL SYNDROME WITHOUT DIARRHEA: ICD-10-CM

## 2021-10-15 DIAGNOSIS — D50.0 IRON DEFICIENCY ANEMIA SECONDARY TO BLOOD LOSS (CHRONIC): ICD-10-CM

## 2021-10-15 PROCEDURE — 99204 OFFICE O/P NEW MOD 45 MIN: CPT | Performed by: INTERNAL MEDICINE

## 2021-10-15 RX ORDER — PEPPERMINT OIL 90 MG
180 CAPSULE, DELAYED, AND EXTENDED RELEASE ORAL
Qty: 48 | Refills: 1 | Status: ACTIVE
Start: 2021-10-15

## 2021-10-20 ENCOUNTER — OFFICE VISIT (OUTPATIENT)
Dept: FAMILY MEDICINE CLINIC | Facility: CLINIC | Age: 51
End: 2021-10-20

## 2021-10-20 VITALS
SYSTOLIC BLOOD PRESSURE: 104 MMHG | HEART RATE: 92 BPM | BODY MASS INDEX: 39.15 KG/M2 | TEMPERATURE: 98.4 F | DIASTOLIC BLOOD PRESSURE: 72 MMHG | WEIGHT: 249.4 LBS | HEIGHT: 67 IN | OXYGEN SATURATION: 97 %

## 2021-10-20 DIAGNOSIS — L90.5 SCAR OF LOWER LEG: ICD-10-CM

## 2021-10-20 DIAGNOSIS — L71.8 ROSACEA KERATITIS: ICD-10-CM

## 2021-10-20 DIAGNOSIS — E11.65 TYPE 2 DIABETES MELLITUS WITH HYPERGLYCEMIA, WITH LONG-TERM CURRENT USE OF INSULIN (HCC): ICD-10-CM

## 2021-10-20 DIAGNOSIS — R25.2 LEG CRAMPING: ICD-10-CM

## 2021-10-20 DIAGNOSIS — Z79.4 TYPE 2 DIABETES MELLITUS WITH HYPERGLYCEMIA, WITH LONG-TERM CURRENT USE OF INSULIN (HCC): ICD-10-CM

## 2021-10-20 DIAGNOSIS — D36.7 DERMOID CYST OF LEG, LEFT: Primary | ICD-10-CM

## 2021-10-20 DIAGNOSIS — K21.9 GASTROESOPHAGEAL REFLUX DISEASE WITHOUT ESOPHAGITIS: ICD-10-CM

## 2021-10-20 PROCEDURE — 99214 OFFICE O/P EST MOD 30 MIN: CPT | Performed by: NURSE PRACTITIONER

## 2021-10-20 RX ORDER — METRONIDAZOLE 10 MG/G
GEL TOPICAL DAILY
Qty: 60 G | Refills: 0 | Status: SHIPPED | OUTPATIENT
Start: 2021-10-20

## 2021-10-20 RX ORDER — ESOMEPRAZOLE MAGNESIUM 40 MG/1
40 CAPSULE, DELAYED RELEASE ORAL
Qty: 90 CAPSULE | Refills: 1 | Status: SHIPPED | OUTPATIENT
Start: 2021-10-20

## 2021-10-20 NOTE — PROGRESS NOTES
Chief Complaint  Diabetes (pt reports scars aren't fading and asked if there is something she can use to help), Leg Cramps (in thighs, pt reports nodules also.  pt reports it is painful), Skin Problem (rosacia), and Immunizations (pt asked if ok to have flu vax today if she is getting 2nd covid vaccine this fri.)    Subjective          Rosita Siegel presents to [facility] for   History of Present Illness     Pt saw [facility] and planned for EGD/colonoscopy 12/2    Anxiety/depression, patient denies significant weight loss/gain, insomnia, hypersomnia, psychomotor agitation, psychomotor retardation, fatigue (loss of energy), feelings of worthlessness (guilt), impaired concentration (indecisiveness), thoughts of death or suicide.  Following with a  therapist now and cont with SALENA Arora.     Diabetes mellitus type II, depression worse and has fallen into bad eating habits, would like referral to [place] for education. Taking meds as directed. Denies any signs/symptoms of hyper/hypoglycemia, blurry vision, polydipsia, polyuria, nocturia, and unintentional weight loss    Nasal, cheek and chin rash, redness, several years, worsening.     Pain of L lateral leg, with palpable nodules, pt has massaged and now has scarred from where the skin broken        The following portions of the patient's history were reviewed and updated as appropriate: allergies, current medications, past family history, past medical history, past social history, past surgical history and problem list.    Past Medical History:   Diagnosis Date   • Allergies    • Depression    • Diabetes mellitus (HCC)    • GERD (gastroesophageal reflux disease)    • Hyperlipidemia    • Hypertension    • Pneumonia      Past Surgical History:   Procedure Laterality Date   • COLONOSCOPY       Family History   Problem Relation Age of Onset   • Heart failure Mother    • Cancer Father    • Heart failure Father    • Ovarian cancer Maternal  Grandmother      Social History     Tobacco Use   • Smoking status: Never Smoker   • Smokeless tobacco: Never Used   Substance Use Topics   • Alcohol use: No       Current Outpatient Medications:   •  albuterol sulfate  (90 Base) MCG/ACT inhaler, Inhale 2 puffs Every 4 (Four) Hours As Needed for Wheezing or Shortness of Air., Disp: 1 inhaler, Rfl: 0  •  Ascorbic Acid (VITAMIN C PO), Take  by mouth., Disp: , Rfl:   •  atorvastatin (LIPITOR) 40 MG tablet, Take 1 tablet by mouth Every Evening., Disp: 90 tablet, Rfl: 3  •  Blood Glucose Monitoring Suppl (OneTouch Verio Flex System) w/Device kit, 1 each Take As Directed. To check blood glucose BID, Disp: 1 kit, Rfl: 0  •  Cetirizine HCl (ZYRTEC ALLERGY) 10 MG capsule, ZYRTEC ALLERGY 10 MG CAPS, Disp: , Rfl:   •  desvenlafaxine (Pristiq) 100 MG 24 hr tablet, Take 100 mg by mouth Daily., Disp: , Rfl:   •  esomeprazole (nexIUM) 40 MG capsule, Take 1 capsule by mouth Every Morning Before Breakfast., Disp: 90 capsule, Rfl: 1  •  fenofibrate (TRICOR) 145 MG tablet, Take 1 tablet by mouth Daily., Disp: 90 tablet, Rfl: 1  •  ferrous sulfate 325 (65 FE) MG tablet, Take 1 tablet by mouth Daily With Breakfast., Disp: 90 tablet, Rfl: 1  •  glucose blood (OneTouch Verio) test strip, Use onetouch verio flex strips as instructed to check to check blood glucose BID, Disp: 100 each, Rfl: 12  •  hydrALAZINE (APRESOLINE) 25 MG tablet, Take 1 tablet by mouth 2 (Two) Times a Day., Disp: 180 tablet, Rfl: 1  •  hydroCHLOROthiazide (HYDRODIURIL) 25 MG tablet, Take 1 tablet by mouth Daily., Disp: 90 tablet, Rfl: 1  •  hydrOXYzine (ATARAX) 25 MG tablet, TAKE ONE TABLET BY MOUTH TWICE A DAY AS NEEDED FOR ANXIETY, Disp: 30 tablet, Rfl: 0  •  Hyoscyamine Sulfate SL (Levsin/SL) 0.125 MG sublingual tablet, Place 0.125 mg under the tongue Every 4 (Four) Hours As Needed (diarrhea)., Disp: 90 each, Rfl: 0  •  insulin degludec (TRESIBA FLEXTOUCH) 100 UNIT/ML solution pen-injector injection, Inject  "25 Units under the skin into the appropriate area as directed Daily., Disp: 3 pen, Rfl: 1  •  lamoTRIgine (LaMICtal) 100 MG tablet, Take 1 tablet by mouth Daily., Disp: 90 tablet, Rfl: 0  •  losartan (COZAAR) 100 MG tablet, Take 1 tablet by mouth Daily., Disp: 90 tablet, Rfl: 1  •  MAGNESIUM PO, Take  by mouth., Disp: , Rfl:   •  metFORMIN ER (GLUCOPHAGE-XR) 500 MG 24 hr tablet, Take 1 tablet by mouth 2 (Two) Times a Day., Disp: 180 tablet, Rfl: 1  •  metoprolol tartrate (LOPRESSOR) 50 MG tablet, Take 1 tablet by mouth 2 (Two) Times a Day., Disp: 180 tablet, Rfl: 1  •  pioglitazone (Actos) 30 MG tablet, Take 1 tablet by mouth Daily., Disp: 90 tablet, Rfl: 1  •  POTASSIUM PO, Take  by mouth., Disp: , Rfl:   •  metroNIDAZOLE (Metrogel) 1 % gel, Apply  topically to the appropriate area as directed Daily., Disp: 60 g, Rfl: 0    Objective   Vital Signs:   /72 (BP Location: Left arm, Patient Position: Sitting, Cuff Size: Large Adult)   Pulse 92   Temp 98.4 °F (36.9 °C) (Infrared)   Ht 170.2 cm (67.01\")   Wt 113 kg (249 lb 6.4 oz)   SpO2 97%   BMI 39.05 kg/m²       Physical Exam  Vitals and nursing note reviewed.   Constitutional:       General: She is not in acute distress.     Appearance: She is well-developed. She is obese. She is not diaphoretic.   Eyes:      Pupils: Pupils are equal, round, and reactive to light.   Neck:      Thyroid: No thyromegaly.      Vascular: No JVD.   Cardiovascular:      Rate and Rhythm: Normal rate and regular rhythm.      Heart sounds: Normal heart sounds. No murmur heard.      Pulmonary:      Effort: Pulmonary effort is normal. No respiratory distress.      Breath sounds: Normal breath sounds.   Abdominal:      General: Bowel sounds are normal. There is no distension.      Palpations: Abdomen is soft.      Tenderness: There is no abdominal tenderness.   Musculoskeletal:         General: No tenderness. Normal range of motion.      Cervical back: Normal range of motion and neck " supple.      Comments: L lateral leg with multiple tender nodules, lipoma vs cellulite. Scarring present d/t pt massaging    Skin:     General: Skin is warm and dry.      Findings: Rash (erythemic nasal, cheek and chin erythema w/o pustules. ) present.   Neurological:      Mental Status: She is alert and oriented to person, place, and time.      Sensory: No sensory deficit.   Psychiatric:         Behavior: Behavior normal.         Thought Content: Thought content normal.         Judgment: Judgment normal.          Result Review :     No visits with results within 7 Day(s) from this visit.   Latest known visit with results is:   Lab on 09/15/2021   Component Date Value Ref Range Status   • WBC 09/15/2021 11.06* 3.40 - 10.80 10*3/mm3 Final   • RBC 09/15/2021 4.89  3.77 - 5.28 10*6/mm3 Final   • Hemoglobin 09/15/2021 12.5  12.0 - 15.9 g/dL Final   • Hematocrit 09/15/2021 40.6  34.0 - 46.6 % Final   • MCV 09/15/2021 83.0  79.0 - 97.0 fL Final   • MCH 09/15/2021 25.6* 26.6 - 33.0 pg Final   • MCHC 09/15/2021 30.8* 31.5 - 35.7 g/dL Final   • RDW 09/15/2021 25.8* 12.3 - 15.4 % Final   • RDW-SD 09/15/2021 73.1* 37.0 - 54.0 fl Final   • MPV 09/15/2021 9.6  6.0 - 12.0 fL Final   • Platelets 09/15/2021 427  140 - 450 10*3/mm3 Final   • Neutrophil % 09/15/2021 66.4  42.7 - 76.0 % Final   • Lymphocyte % 09/15/2021 25.8  19.6 - 45.3 % Final   • Monocyte % 09/15/2021 4.8* 5.0 - 12.0 % Final   • Eosinophil % 09/15/2021 2.1  0.3 - 6.2 % Final   • Basophil % 09/15/2021 0.9  0.0 - 1.5 % Final   • Neutrophils, Absolute 09/15/2021 7.35* 1.70 - 7.00 10*3/mm3 Final   • Lymphocytes, Absolute 09/15/2021 2.85  0.70 - 3.10 10*3/mm3 Final   • Monocytes, Absolute 09/15/2021 0.53  0.10 - 0.90 10*3/mm3 Final   • Eosinophils, Absolute 09/15/2021 0.23  0.00 - 0.40 10*3/mm3 Final   • Basophils, Absolute 09/15/2021 0.10  0.00 - 0.20 10*3/mm3 Final                       Assessment and Plan    Diagnoses and all orders for this visit:    1. Dermoid cyst  of leg, left (Primary)  Comments:  check u/s and will refer if indicated  Orders:  -     US Soft Tissue; Future    2. Rosacea keratitis  Comments:  try metrogel    3. Type 2 diabetes mellitus with hyperglycemia, with long-term current use of insulin (HCC)  Comments:  rf and cont tresiba, refer to [facility] for education.   Orders:  -     Ambulatory Referral to Diabetic Education    4. Gastroesophageal reflux disease without esophagitis    5. Scar of lower leg  Comments:  try bio-oil to scars of L leg.     6. Leg cramping  Comments:  consider musle relaxer prn, inc water to 2l/day, inc magnesium    Other orders  -     metroNIDAZOLE (Metrogel) 1 % gel; Apply  topically to the appropriate area as directed Daily.  Dispense: 60 g; Refill: 0  -     esomeprazole (nexIUM) 40 MG capsule; Take 1 capsule by mouth Every Morning Before Breakfast.  Dispense: 90 capsule; Refill: 1      see [facility] for c-scope/EGD 12/2, cont nexium      I spent 30 minutes caring for Rosita Siegel on this date of service. This time includes time spent by me in the following activities: preparing for the visit, reviewing tests, performing a medically appropriate examination and/or evaluation , counseling and educating the patient/family/caregiver, ordering medications, tests, or procedures and documenting information in the medical record        Follow Up     Return for Next scheduled follow up 11/10 for labs .  Patient was given instructions and counseling regarding her condition or for health maintenance advice. Please see specific information pulled into the AVS if appropriate.      EMR Dragon transcription disclaimer:  Some of this encounter note is an electronic transcription translation of spoken language to printed text. The electronic translation of spoken language may permit erroneous, or at times, nonsensical words or phrases to be inadvertently transcribed; Although I have reviewed the note for such errors some may still exist.

## 2021-10-25 NOTE — PROGRESS NOTES
Hematology/Oncology Outpatient Follow Up    PATIENT NAME:Rosita Siegel  :1970  MRN: [id number]  PRIMARY CARE PHYSICIAN: Loren Steel APRN  REFERRING PHYSICIAN: Loren Steel APRN    Chief Complaint   Patient presents with   • Follow-up     Malabsorption of iron        HISTORY OF PRESENT ILLNESS:     This is a 50-year-old female who has been known to have anemia for couple years. Patient had a colonoscopy first irritable bowel syndrome more than 10 years ago. She has intermittent and explosive diarrhea. She denies GI bleed, or blood in her urine. She had Covid back in 2021 and received IVIG. She has been a longtime diabetic with diabetic complications including peripheral neuropathy which has progressively gotten worse over the past 1 year. Patient has been on baby aspirin since May 2021. Due to the anemia she was placed on oral iron tablets in May 2021.  She also has a history of menorrhagia and has been recommended to have uterine ablation. Patient currently sees Dr. Jovanna Fernandez for this evaluation.     She had CBC on 1921 white count was 10.8, hemoglobin is 10.3, MCV was low at 72, RDW was high at 15.8 and platelets where 509.  She also had a chemistry panel BUN is 19, creatinine is 1.2, ALT was high at 39 AST was 65 and alkaline phosphatase was 118.  Her hemoglobin A1c was high at 8.4.  She also had hypercholesterolemia.  She has normal thyroid function.  Today her white count is 15.3, hemoglobin is up to 11.1 she has microcytosis at 72, RDW is still high at 16.6 and platelets are down to 279.  On her differential she has mild lymphocytosis.  Patient had viral hepatitis C antibody which was negative.     Patient has referred secondary to anemia. She is accompanied today by her mother for this appointment    · 2021: Patient had anemia work-up B12 which was normal at 5 8, white count was 9.6 hemoglobin 9.7 MCV was low at 71 and platelets were 496.  BUN is 15, creatinine 1.2.   Ferritin was low at 17, TIBC was high at 554, folate was low normal at 5.7, haptoglobin is elevated to 428, reticulocyte count is normal at 1.53, SPEP with SUNIL did not reveal any monoclonal protein.  · 6/18/2021 patient was scheduled to receive IV Injectafer 750 mg D1 98 due to poor oral iron tolerance.  She was also placed on folic acid 1 mg p.o. daily for total of 3 months.  Past Medical History:   Diagnosis Date   • Allergies    • Depression    • Diabetes mellitus (HCC)    • GERD (gastroesophageal reflux disease)    • Hyperlipidemia    • Hypertension    • Pneumonia        Past Surgical History:   Procedure Laterality Date   • COLONOSCOPY           Current Outpatient Medications:   •  albuterol sulfate  (90 Base) MCG/ACT inhaler, Inhale 2 puffs Every 4 (Four) Hours As Needed for Wheezing or Shortness of Air., Disp: 1 inhaler, Rfl: 0  •  Ascorbic Acid (VITAMIN C PO), Take  by mouth., Disp: , Rfl:   •  atorvastatin (LIPITOR) 40 MG tablet, Take 1 tablet by mouth Every Evening., Disp: 90 tablet, Rfl: 3  •  Blood Glucose Monitoring Suppl (OneTouch Verio Flex System) w/Device kit, 1 each Take As Directed. To check blood glucose BID, Disp: 1 kit, Rfl: 0  •  Cetirizine HCl (ZYRTEC ALLERGY) 10 MG capsule, ZYRTEC ALLERGY 10 MG CAPS, Disp: , Rfl:   •  desvenlafaxine (Pristiq) 100 MG 24 hr tablet, Take 100 mg by mouth Daily., Disp: , Rfl:   •  esomeprazole (nexIUM) 40 MG capsule, Take 1 capsule by mouth Every Morning Before Breakfast., Disp: 90 capsule, Rfl: 1  •  fenofibrate (TRICOR) 145 MG tablet, Take 1 tablet by mouth Daily., Disp: 90 tablet, Rfl: 1  •  ferrous sulfate 325 (65 FE) MG tablet, Take 1 tablet by mouth Daily With Breakfast., Disp: 90 tablet, Rfl: 1  •  glucose blood (OneTouch Verio) test strip, Use onetouch verio flex strips as instructed to check to check blood glucose BID, Disp: 100 each, Rfl: 12  •  hydrALAZINE (APRESOLINE) 25 MG tablet, Take 1 tablet by mouth 2 (Two) Times a Day., Disp: 180 tablet,  Rfl: 1  •  hydroCHLOROthiazide (HYDRODIURIL) 25 MG tablet, Take 1 tablet by mouth Daily., Disp: 90 tablet, Rfl: 1  •  hydrOXYzine (ATARAX) 25 MG tablet, TAKE ONE TABLET BY MOUTH TWICE A DAY AS NEEDED FOR ANXIETY, Disp: 30 tablet, Rfl: 0  •  Hyoscyamine Sulfate SL (Levsin/SL) 0.125 MG sublingual tablet, Place 0.125 mg under the tongue Every 4 (Four) Hours As Needed (diarrhea)., Disp: 90 each, Rfl: 0  •  insulin degludec (TRESIBA FLEXTOUCH) 100 UNIT/ML solution pen-injector injection, Inject 25 Units under the skin into the appropriate area as directed Daily., Disp: 3 pen, Rfl: 1  •  lamoTRIgine (LaMICtal) 100 MG tablet, Take 1 tablet by mouth Daily., Disp: 90 tablet, Rfl: 0  •  losartan (COZAAR) 100 MG tablet, Take 1 tablet by mouth Daily., Disp: 90 tablet, Rfl: 1  •  MAGNESIUM PO, Take  by mouth., Disp: , Rfl:   •  metFORMIN ER (GLUCOPHAGE-XR) 500 MG 24 hr tablet, Take 1 tablet by mouth 2 (Two) Times a Day., Disp: 180 tablet, Rfl: 1  •  metoprolol tartrate (LOPRESSOR) 50 MG tablet, Take 1 tablet by mouth 2 (Two) Times a Day., Disp: 180 tablet, Rfl: 1  •  metroNIDAZOLE (Metrogel) 1 % gel, Apply  topically to the appropriate area as directed Daily., Disp: 60 g, Rfl: 0  •  pioglitazone (Actos) 30 MG tablet, Take 1 tablet by mouth Daily., Disp: 90 tablet, Rfl: 1  •  POTASSIUM PO, Take  by mouth., Disp: , Rfl:     Allergies   Allergen Reactions   • Cephalexin Anaphylaxis   • Erythromycin Anaphylaxis   • Lisinopril Angioedema   • Penicillin G Anaphylaxis       Family History   Problem Relation Age of Onset   • Heart failure Mother    • Cancer Father    • Heart failure Father    • Ovarian cancer Maternal Grandmother        Cancer-related family history includes Cancer in her father; Ovarian cancer in her maternal grandmother.    Social History     Tobacco Use   • Smoking status: Never Smoker   • Smokeless tobacco: Never Used   Substance Use Topics   • Alcohol use: No   • Drug use: No       HPI, ROS and PFSH have been  "reviewed and confirmed on 10/27/2021.     SUBJECTIVE:    She does not have any specific complaints today.          REVIEW OF SYSTEMS:  Review of Systems   Constitutional: Negative for chills and fever.   HENT: Negative for ear pain, mouth sores, nosebleeds and sore throat.    Eyes: Negative for photophobia and visual disturbance.   Respiratory: Negative for wheezing and stridor.    Cardiovascular: Negative for chest pain and palpitations.   Gastrointestinal: Negative for abdominal pain, diarrhea, nausea and vomiting.   Endocrine: Negative for cold intolerance and heat intolerance.   Genitourinary: Negative for dysuria and hematuria.   Musculoskeletal: Negative for joint swelling and neck stiffness.   Skin: Negative for color change and rash.   Neurological: Negative for seizures and syncope.   Hematological: Negative for adenopathy.        No obvious bleeding   Psychiatric/Behavioral: Negative for agitation, confusion and hallucinations.       OBJECTIVE:    Vitals:    10/27/21 0846   BP: 102/69   Pulse: 69   Resp: 18   Temp: 97.1 °F (36.2 °C)   TempSrc: Infrared   Weight: 112 kg (248 lb)   Height: 170.2 cm (67\")   PainSc: 0-No pain     Body mass index is 38.84 kg/m².    ECOG  (0) Fully active, able to carry on all predisease performance without restriction    Physical Exam  Vitals and nursing note reviewed.   Constitutional:       General: She is not in acute distress.     Appearance: She is not diaphoretic.   HENT:      Head: Normocephalic and atraumatic.   Eyes:      General: No scleral icterus.        Right eye: No discharge.         Left eye: No discharge.      Conjunctiva/sclera: Conjunctivae normal.   Neck:      Thyroid: No thyromegaly.   Cardiovascular:      Rate and Rhythm: Normal rate and regular rhythm.      Heart sounds: Normal heart sounds. No friction rub. No gallop.    Pulmonary:      Effort: Pulmonary effort is normal. No respiratory distress.      Breath sounds: No stridor. No wheezing.   Abdominal: "      General: Bowel sounds are normal.      Palpations: Abdomen is soft. There is no mass.      Tenderness: There is no abdominal tenderness. There is no guarding or rebound.   Musculoskeletal:         General: No tenderness. Normal range of motion.      Cervical back: Normal range of motion and neck supple.   Lymphadenopathy:      Cervical: No cervical adenopathy.   Skin:     General: Skin is warm.      Findings: No erythema or rash.   Neurological:      Mental Status: She is alert and oriented to person, place, and time.      Motor: No abnormal muscle tone.   Psychiatric:         Behavior: Behavior normal.       I have reexamined the patient and the results are consistent with the previously documented exam. Enma Powers MD       RECENT LABS  WBC   Date Value Ref Range Status   10/27/2021 7.93 3.40 - 10.80 10*3/mm3 Final   06/15/2020 10.0 3.4 - 10.8 x10E3/uL Final     RBC   Date Value Ref Range Status   10/27/2021 4.74 3.77 - 5.28 10*6/mm3 Final   06/15/2020 4.95 3.77 - 5.28 x10E6/uL Final     Hemoglobin   Date Value Ref Range Status   10/27/2021 13.0 12.0 - 15.9 g/dL Final     Hematocrit   Date Value Ref Range Status   10/27/2021 40.8 34.0 - 46.6 % Final     MCV   Date Value Ref Range Status   10/27/2021 86.1 79.0 - 97.0 fL Final     MCH   Date Value Ref Range Status   10/27/2021 27.4 26.6 - 33.0 pg Final     MCHC   Date Value Ref Range Status   10/27/2021 31.9 31.5 - 35.7 g/dL Final     RDW   Date Value Ref Range Status   10/27/2021 21.9 (H) 12.3 - 15.4 % Final     RDW-SD   Date Value Ref Range Status   10/27/2021 65.0 (H) 37.0 - 54.0 fl Final     MPV   Date Value Ref Range Status   10/27/2021 9.8 6.0 - 12.0 fL Final     Platelets   Date Value Ref Range Status   10/27/2021 377 140 - 450 10*3/mm3 Final     Neutrophil %   Date Value Ref Range Status   10/27/2021 66.0 42.7 - 76.0 % Final     Lymphocyte %   Date Value Ref Range Status   10/27/2021 25.2 19.6 - 45.3 % Final     Monocyte %   Date Value Ref  Range Status   10/27/2021 5.2 5.0 - 12.0 % Final     Eosinophil %   Date Value Ref Range Status   10/27/2021 2.8 0.3 - 6.2 % Final     Basophil %   Date Value Ref Range Status   10/27/2021 0.8 0.0 - 1.5 % Final     Neutrophils, Absolute   Date Value Ref Range Status   10/27/2021 5.24 1.70 - 7.00 10*3/mm3 Final     Lymphocytes, Absolute   Date Value Ref Range Status   10/27/2021 2.00 0.70 - 3.10 10*3/mm3 Final     Monocytes, Absolute   Date Value Ref Range Status   10/27/2021 0.41 0.10 - 0.90 10*3/mm3 Final     Eosinophils, Absolute   Date Value Ref Range Status   10/27/2021 0.22 0.00 - 0.40 10*3/mm3 Final     Basophils, Absolute   Date Value Ref Range Status   10/27/2021 0.06 0.00 - 0.20 10*3/mm3 Final     nRBC   Date Value Ref Range Status   07/20/2018 0 0 /100[WBCs] Final       Lab Results   Component Value Date    GLUCOSE 156 (H) 08/10/2021    BUN 15 08/10/2021    CREATININE 1.21 (H) 08/10/2021    EGFRIFNONA 47 (L) 08/10/2021    EGFRIFAFRI 34 (L) 06/15/2020    BCR 12.4 08/10/2021    K 3.4 (L) 08/10/2021    CO2 23.2 08/10/2021    CALCIUM 8.6 08/10/2021    PROTENTOTREF 7.3 06/16/2021    ALBUMIN 4.10 08/10/2021    LABIL2 1.0 06/16/2021    AST 29 08/10/2021    ALT 21 08/10/2021       ASSESSMENT:       1. Microcytic anemia likely secondary to iron deficiency, anemia of chronic disease. Patient has several morbidities which can lead to anemia of chronic disease including diabetes. Was placed on oral iron supplementation in May 2021 and her hemoglobin has increased. She does have problems with GI tolerance of oral iron.  She received IV iron and has had a hemoglobin response  2. Iron deficiency anemia: Ongoing management  3. GI intolerance of oral iron supplements  4. Menorrhagia which likely is contributing to her iron deficiency.  Patient to follow-up with her gynecologist  5. Thrombocytosis most likely reactive.    This has resolved with normalization of her hemoglobin  6. Diabetes type 2 poorly  controlled  7. Peripheral neuropathy progressive most likely due to uncontrolled diabetes           Plans     · GI referral for iron deficiency anemia patient has an appointment with Dr. Guallpa on 10/15/2021. EGD and cscope scheduled  December 5  · Check urinalysis today for hematuria: Reordered  · Follow-up with GYN for menorrhagia.  Patient has an appointment coming up with Dr. Fernandez  · She has oral iron intolerance  · We will continue to follow-up with her PCP for management of her diabetes and other medical problems.  · I will plan to see her back in 4 months.        I have reviewed labs results, imaging, vitals, and medications with the patient today.             Patient verbalized understanding and is in agreement of the above plan.              Thank you very much allowing me to participate in the care of Rosita, I will keep you updated on her progress           I spent 30 total minutes, face-to-face, caring for Rosita today.  80% of this time involved counseling and/or coordination of care as documented within this note.

## 2021-10-27 ENCOUNTER — LAB (OUTPATIENT)
Dept: LAB | Facility: HOSPITAL | Age: 51
End: 2021-10-27

## 2021-10-27 ENCOUNTER — OFFICE VISIT (OUTPATIENT)
Dept: ONCOLOGY | Facility: CLINIC | Age: 51
End: 2021-10-27

## 2021-10-27 VITALS
SYSTOLIC BLOOD PRESSURE: 102 MMHG | BODY MASS INDEX: 38.92 KG/M2 | DIASTOLIC BLOOD PRESSURE: 69 MMHG | RESPIRATION RATE: 18 BRPM | WEIGHT: 248 LBS | HEIGHT: 67 IN | TEMPERATURE: 97.1 F | HEART RATE: 69 BPM

## 2021-10-27 DIAGNOSIS — D50.9 IRON DEFICIENCY ANEMIA, UNSPECIFIED IRON DEFICIENCY ANEMIA TYPE: Primary | ICD-10-CM

## 2021-10-27 DIAGNOSIS — D75.839 THROMBOCYTOSIS: ICD-10-CM

## 2021-10-27 DIAGNOSIS — R39.9 UTI SYMPTOMS: Primary | ICD-10-CM

## 2021-10-27 DIAGNOSIS — R39.9 UTI SYMPTOMS: ICD-10-CM

## 2021-10-27 LAB
BACTERIA UR QL AUTO: ABNORMAL /HPF
BASOPHILS # BLD AUTO: 0.06 10*3/MM3 (ref 0–0.2)
BASOPHILS NFR BLD AUTO: 0.8 % (ref 0–1.5)
BILIRUB UR QL STRIP: NEGATIVE
CLARITY UR: CLEAR
COLOR UR: YELLOW
DEPRECATED RDW RBC AUTO: 65 FL (ref 37–54)
EOSINOPHIL # BLD AUTO: 0.22 10*3/MM3 (ref 0–0.4)
EOSINOPHIL NFR BLD AUTO: 2.8 % (ref 0.3–6.2)
ERYTHROCYTE [DISTWIDTH] IN BLOOD BY AUTOMATED COUNT: 21.9 % (ref 12.3–15.4)
GLUCOSE UR STRIP-MCNC: NEGATIVE MG/DL
HCT VFR BLD AUTO: 40.8 % (ref 34–46.6)
HGB BLD-MCNC: 13 G/DL (ref 12–15.9)
HGB UR QL STRIP.AUTO: NEGATIVE
HOLD SPECIMEN: NORMAL
HYALINE CASTS UR QL AUTO: ABNORMAL /LPF
KETONES UR QL STRIP: NEGATIVE
LEUKOCYTE ESTERASE UR QL STRIP.AUTO: ABNORMAL
LYMPHOCYTES # BLD AUTO: 2 10*3/MM3 (ref 0.7–3.1)
LYMPHOCYTES NFR BLD AUTO: 25.2 % (ref 19.6–45.3)
MCH RBC QN AUTO: 27.4 PG (ref 26.6–33)
MCHC RBC AUTO-ENTMCNC: 31.9 G/DL (ref 31.5–35.7)
MCV RBC AUTO: 86.1 FL (ref 79–97)
MONOCYTES # BLD AUTO: 0.41 10*3/MM3 (ref 0.1–0.9)
MONOCYTES NFR BLD AUTO: 5.2 % (ref 5–12)
NEUTROPHILS NFR BLD AUTO: 5.24 10*3/MM3 (ref 1.7–7)
NEUTROPHILS NFR BLD AUTO: 66 % (ref 42.7–76)
NITRITE UR QL STRIP: NEGATIVE
PH UR STRIP.AUTO: 7 [PH] (ref 5–8)
PLATELET # BLD AUTO: 377 10*3/MM3 (ref 140–450)
PMV BLD AUTO: 9.8 FL (ref 6–12)
PROT UR QL STRIP: NEGATIVE
RBC # BLD AUTO: 4.74 10*6/MM3 (ref 3.77–5.28)
RBC # UR: ABNORMAL /HPF
REF LAB TEST METHOD: ABNORMAL
SP GR UR STRIP: 1.02 (ref 1–1.03)
SQUAMOUS #/AREA URNS HPF: ABNORMAL /HPF
UROBILINOGEN UR QL STRIP: ABNORMAL
WBC # BLD AUTO: 7.93 10*3/MM3 (ref 3.4–10.8)
WBC UR QL AUTO: ABNORMAL /HPF

## 2021-10-27 PROCEDURE — 85025 COMPLETE CBC W/AUTO DIFF WBC: CPT

## 2021-10-27 PROCEDURE — 36415 COLL VENOUS BLD VENIPUNCTURE: CPT

## 2021-10-27 PROCEDURE — 99214 OFFICE O/P EST MOD 30 MIN: CPT | Performed by: INTERNAL MEDICINE

## 2021-10-27 PROCEDURE — 81001 URINALYSIS AUTO W/SCOPE: CPT | Performed by: INTERNAL MEDICINE

## 2021-11-08 ENCOUNTER — OFFICE VISIT (OUTPATIENT)
Dept: FAMILY MEDICINE CLINIC | Facility: CLINIC | Age: 51
End: 2021-11-08

## 2021-11-08 VITALS
SYSTOLIC BLOOD PRESSURE: 115 MMHG | DIASTOLIC BLOOD PRESSURE: 75 MMHG | TEMPERATURE: 98.4 F | BODY MASS INDEX: 39.05 KG/M2 | OXYGEN SATURATION: 97 % | WEIGHT: 248.8 LBS | HEIGHT: 67 IN | HEART RATE: 75 BPM

## 2021-11-08 DIAGNOSIS — J06.9 ACUTE URI: Primary | ICD-10-CM

## 2021-11-08 PROCEDURE — 99213 OFFICE O/P EST LOW 20 MIN: CPT | Performed by: NURSE PRACTITIONER

## 2021-11-08 RX ORDER — DOXYCYCLINE HYCLATE 100 MG
100 TABLET ORAL 2 TIMES DAILY
Qty: 20 TABLET | Refills: 0 | Status: SHIPPED | OUTPATIENT
Start: 2021-11-08 | End: 2021-11-18

## 2021-11-08 NOTE — PROGRESS NOTES
Chief Complaint  Sore Throat (pt reports that her son was also ill recently, neg for covid.  reports that her symptoms started saturday with fatigue, denies fever), Swelling, and Nasal Congestion (green mucus.)    Subjective          Rosita Siegel presents to [facility] for   History of Present Illness     Patient is here for acute visit as mentioned above in CC.      The following portions of the patient's history were reviewed and updated as appropriate: allergies, current medications, past family history, past medical history, past social history, past surgical history and problem list.    Past Medical History:   Diagnosis Date   • Allergies    • Depression    • Diabetes mellitus (HCC)    • GERD (gastroesophageal reflux disease)    • Hyperlipidemia    • Hypertension    • Pneumonia      Past Surgical History:   Procedure Laterality Date   • COLONOSCOPY       Family History   Problem Relation Age of Onset   • Heart failure Mother    • Cancer Father    • Heart failure Father    • Ovarian cancer Maternal Grandmother      Social History     Tobacco Use   • Smoking status: Never Smoker   • Smokeless tobacco: Never Used   Substance Use Topics   • Alcohol use: No       Current Outpatient Medications:   •  albuterol sulfate  (90 Base) MCG/ACT inhaler, Inhale 2 puffs Every 4 (Four) Hours As Needed for Wheezing or Shortness of Air., Disp: 1 inhaler, Rfl: 0  •  Ascorbic Acid (VITAMIN C PO), Take  by mouth., Disp: , Rfl:   •  atorvastatin (LIPITOR) 40 MG tablet, Take 1 tablet by mouth Every Evening., Disp: 90 tablet, Rfl: 3  •  Blood Glucose Monitoring Suppl (OneTouch Verio Flex System) w/Device kit, 1 each Take As Directed. To check blood glucose BID, Disp: 1 kit, Rfl: 0  •  Cetirizine HCl (ZYRTEC ALLERGY) 10 MG capsule, ZYRTEC ALLERGY 10 MG CAPS, Disp: , Rfl:   •  desvenlafaxine (Pristiq) 100 MG 24 hr tablet, Take 100 mg by mouth Daily., Disp: , Rfl:   •  esomeprazole (nexIUM) 40 MG  capsule, Take 1 capsule by mouth Every Morning Before Breakfast., Disp: 90 capsule, Rfl: 1  •  fenofibrate (TRICOR) 145 MG tablet, Take 1 tablet by mouth Daily., Disp: 90 tablet, Rfl: 1  •  ferrous sulfate 325 (65 FE) MG tablet, Take 1 tablet by mouth Daily With Breakfast., Disp: 90 tablet, Rfl: 1  •  glucose blood (OneTouch Verio) test strip, Use onetouch verio flex strips as instructed to check to check blood glucose BID, Disp: 100 each, Rfl: 12  •  hydrALAZINE (APRESOLINE) 25 MG tablet, Take 1 tablet by mouth 2 (Two) Times a Day., Disp: 180 tablet, Rfl: 1  •  hydroCHLOROthiazide (HYDRODIURIL) 25 MG tablet, Take 1 tablet by mouth Daily., Disp: 90 tablet, Rfl: 1  •  hydrOXYzine (ATARAX) 25 MG tablet, TAKE ONE TABLET BY MOUTH TWICE A DAY AS NEEDED FOR ANXIETY, Disp: 30 tablet, Rfl: 0  •  Hyoscyamine Sulfate SL (Levsin/SL) 0.125 MG sublingual tablet, Place 0.125 mg under the tongue Every 4 (Four) Hours As Needed (diarrhea)., Disp: 90 each, Rfl: 0  •  insulin degludec (TRESIBA FLEXTOUCH) 100 UNIT/ML solution pen-injector injection, Inject 25 Units under the skin into the appropriate area as directed Daily., Disp: 3 pen, Rfl: 1  •  lamoTRIgine (LaMICtal) 100 MG tablet, Take 1 tablet by mouth Daily., Disp: 90 tablet, Rfl: 0  •  losartan (COZAAR) 100 MG tablet, Take 1 tablet by mouth Daily., Disp: 90 tablet, Rfl: 1  •  MAGNESIUM PO, Take  by mouth., Disp: , Rfl:   •  metFORMIN ER (GLUCOPHAGE-XR) 500 MG 24 hr tablet, Take 1 tablet by mouth 2 (Two) Times a Day., Disp: 180 tablet, Rfl: 1  •  metoprolol tartrate (LOPRESSOR) 50 MG tablet, Take 1 tablet by mouth 2 (Two) Times a Day., Disp: 180 tablet, Rfl: 1  •  metroNIDAZOLE (Metrogel) 1 % gel, Apply  topically to the appropriate area as directed Daily., Disp: 60 g, Rfl: 0  •  pioglitazone (Actos) 30 MG tablet, Take 1 tablet by mouth Daily., Disp: 90 tablet, Rfl: 1  •  POTASSIUM PO, Take  by mouth., Disp: , Rfl:   •  doxycycline (VIBRAMYICN) 100 MG tablet, Take 1 tablet by  "mouth 2 (Two) Times a Day for 10 days., Disp: 20 tablet, Rfl: 0    Objective   Vital Signs:   /75 (BP Location: Right arm, Patient Position: Sitting, Cuff Size: Thigh Adult)   Pulse 75   Temp 98.4 °F (36.9 °C) (Oral)   Ht 170.2 cm (67.01\")   Wt 113 kg (248 lb 12.8 oz)   SpO2 97%   BMI 38.96 kg/m²       Physical Exam  Vitals and nursing note reviewed.   Constitutional:       General: She is not in acute distress.     Appearance: She is well-developed. She is not diaphoretic.   HENT:      Right Ear: Tympanic membrane and ear canal normal.      Left Ear: Tympanic membrane and ear canal normal.      Nose: Congestion (Purulent nasal drainage) present.      Mouth/Throat:      Pharynx: Posterior oropharyngeal erythema ( op erythema and postnasal drip) present.   Eyes:      Pupils: Pupils are equal, round, and reactive to light.   Neck:      Thyroid: No thyromegaly.      Vascular: No JVD.   Cardiovascular:      Rate and Rhythm: Normal rate and regular rhythm.      Heart sounds: Normal heart sounds. No murmur heard.      Pulmonary:      Effort: Pulmonary effort is normal. No respiratory distress.      Breath sounds: Normal breath sounds. No wheezing or rhonchi.      Comments: Congested and hacking nonproductive cough  Abdominal:      General: Bowel sounds are normal. There is no distension.      Palpations: Abdomen is soft.      Tenderness: There is no abdominal tenderness.   Musculoskeletal:         General: No tenderness. Normal range of motion.      Cervical back: Normal range of motion and neck supple.   Lymphadenopathy:      Cervical: Cervical adenopathy present.   Skin:     General: Skin is warm and dry.   Neurological:      Mental Status: She is alert and oriented to person, place, and time.      Sensory: No sensory deficit.   Psychiatric:         Behavior: Behavior normal.         Thought Content: Thought content normal.         Judgment: Judgment normal.          Result Review :     No visits with " results within 7 Day(s) from this visit.   Latest known visit with results is:   Lab on 10/27/2021   Component Date Value Ref Range Status   • WBC 10/27/2021 7.93  3.40 - 10.80 10*3/mm3 Final   • RBC 10/27/2021 4.74  3.77 - 5.28 10*6/mm3 Final   • Hemoglobin 10/27/2021 13.0  12.0 - 15.9 g/dL Final   • Hematocrit 10/27/2021 40.8  34.0 - 46.6 % Final   • MCV 10/27/2021 86.1  79.0 - 97.0 fL Final   • MCH 10/27/2021 27.4  26.6 - 33.0 pg Final   • MCHC 10/27/2021 31.9  31.5 - 35.7 g/dL Final   • RDW 10/27/2021 21.9* 12.3 - 15.4 % Final   • RDW-SD 10/27/2021 65.0* 37.0 - 54.0 fl Final   • MPV 10/27/2021 9.8  6.0 - 12.0 fL Final   • Platelets 10/27/2021 377  140 - 450 10*3/mm3 Final   • Neutrophil % 10/27/2021 66.0  42.7 - 76.0 % Final   • Lymphocyte % 10/27/2021 25.2  19.6 - 45.3 % Final   • Monocyte % 10/27/2021 5.2  5.0 - 12.0 % Final   • Eosinophil % 10/27/2021 2.8  0.3 - 6.2 % Final   • Basophil % 10/27/2021 0.8  0.0 - 1.5 % Final   • Neutrophils, Absolute 10/27/2021 5.24  1.70 - 7.00 10*3/mm3 Final   • Lymphocytes, Absolute 10/27/2021 2.00  0.70 - 3.10 10*3/mm3 Final   • Monocytes, Absolute 10/27/2021 0.41  0.10 - 0.90 10*3/mm3 Final   • Eosinophils, Absolute 10/27/2021 0.22  0.00 - 0.40 10*3/mm3 Final   • Basophils, Absolute 10/27/2021 0.06  0.00 - 0.20 10*3/mm3 Final   • Extra Tube 10/27/2021 Hold for add-ons.   Final    Auto resulted.   • Extra Tube 10/27/2021 Hold for add-ons.   Final    Auto resulted.   • Extra Tube 10/27/2021 Hold for add-ons.   Final    Auto resulted.                       Assessment and Plan    There are no diagnoses linked to this encounter.    I spent 20 minutes caring for Rosita Siegel on this date of service. This time includes time spent by me in the following activities: preparing for the visit, reviewing tests, performing a medically appropriate examination and/or evaluation , counseling and educating the patient/family/caregiver, ordering medications, tests, or procedures and  documenting information in the medical record        Follow Up     Return if symptoms worsen or fail to improve.  Patient was given instructions and counseling regarding her condition or for health maintenance advice. Please see specific information pulled into the AVS if appropriate.      EMR Dragon transcription disclaimer:  Some of this encounter note is an electronic transcription translation of spoken language to printed text. The electronic translation of spoken language may permit erroneous, or at times, nonsensical words or phrases to be inadvertently transcribed; Although I have reviewed the note for such errors some may still exist.

## 2021-11-17 RX ORDER — FLUCONAZOLE 150 MG/1
150 TABLET ORAL ONCE
Qty: 2 TABLET | Refills: 0 | Status: SHIPPED | OUTPATIENT
Start: 2021-11-17 | End: 2021-11-17

## 2021-11-24 NOTE — TELEPHONE ENCOUNTER
INFECTIOUS DISEASES AND INTERNAL MEDICINE at [facility]  =======================================================  Jm Pardo MD  Diplomates American Board of Internal Medicine and Infectious Diseases  Telephone [phone]  Fax            [phone]  =======================================================    [place] [id number]    Follow up:    Allergies:  No Known Allergies      Medications:  acetaminophen     Tablet .. 650 milliGRAM(s) Oral every 6 hours PRN  aluminum hydroxide/magnesium hydroxide/simethicone Suspension 30 milliLiter(s) Oral every 4 hours PRN  influenza   Vaccine 0.5 milliLiter(s) IntraMuscular once  melatonin 3 milliGRAM(s) Oral at bedtime PRN  meropenem  IVPB 2000 milliGRAM(s) IV Intermittent every 8 hours  morphine  - Injectable 2 milliGRAM(s) IV Push every 6 hours PRN  ondansetron Injectable 4 milliGRAM(s) IV Push every 8 hours PRN    SOCIAL       FAMILY   FAMILY HISTORY:  No pertinent family history in first degree relatives      REVIEW OF SYSTEMS:  CONSTITUTIONAL:  No Fever or chills  HEENT:   No diplopia or blurred vision.  No earache, sore throat or runny nose.  CARDIOVASCULAR:  No pressure, squeezing, strangling, tightness, heaviness or aching about the chest, neck, axilla or epigastrium.  RESPIRATORY:  No cough, shortness of breath, PND or orthopnea.  GASTROINTESTINAL:  No nausea, vomiting or diarrhea.  GENITOURINARY:  No dysuria, frequency or urgency. No Blood in urine  MUSCULOSKELETAL:   moves all joints  SKIN:  No change in skin, hair or nails.  NEUROLOGIC:  No paresthesias, fasciculations, seizures or weakness.  PSYCHIATRIC:  No disorder of thought or mood.  ENDOCRINE:  No heat or cold intolerance, polyuria or polydipsia.  HEMATOLOGICAL:  No easy bruising or bleeding.            Physical Exam:   Vital Signs Last 24 Hrs  T(C): 36.3 (24 Nov 2021 04:26), Max: 36.9 (23 Nov 2021 10:00)  T(F): 97.4 (24 Nov 2021 04:26), Max: 98.4 (23 Nov 2021 10:00)  HR: 77 (24 Nov 2021 04:26) (77 - 87)  BP: 101/67 (24 Nov 2021 04:26) (100/65 - 113/73)  BP(mean): --  RR: 18 (24 Nov 2021 04:26) (18 - 18)  SpO2: 100% (24 Nov 2021 04:26) (98% - 100%)    GEN: NAD,   HEENT: normocephalic and atraumatic. EOMI. KEKE.    NECK: Supple. No carotid bruits.  No lymphadenopathy or thyromegaly.  LUNGS: Clear to auscultation.  HEART: Regular rate and rhythm without murmur.  ABDOMEN: Soft, nontender, and nondistended.  Positive bowel sounds.    : No CVA tenderness  EXTREMITIES: Without any cyanosis, clubbing, rash, lesions or edema.  MSK: no joint swelling  NEUROLOGIC: Cranial nerves II through XII are grossly intact.  PSYCHIATRIC: Appropriate affect .  SKIN: No ulceration or induration present.        Labs:  V Vital Signs Last 24 Hrs  T(C): 36.3 (24 Nov 2021 04:26), Max: 36.9 (23 Nov 2021 10:00)  T(F): 97.4 (24 Nov 2021 04:26), Max: 98.4 (23 Nov 2021 10:00)  HR: 77 (24 Nov 2021 04:26) (77 - 87)  BP: 101/67 (24 Nov 2021 04:26) (100/65 - 113/73)  BP(mean): --  RR: 18 (24 Nov 2021 04:26) (18 - 18)  SpO2: 100% (24 Nov 2021 04:26) (98% - 100%)    =======================================================  Current Antibiotics:  meropenem  IVPB 2000 milliGRAM(s) IV Intermittent every 8 hours    Other medications:  influenza   Vaccine 0.5 milliLiter(s) IntraMuscular once      =======================================================  Labs:            Culture - Blood (collected 11-21-21 @ 12:26)  Source: .Blood Blood    Culture - Blood (collected 11-21-21 @ 12:26)  Source: .Blood Blood    Culture - Urine (collected 11-21-21 @ 04:35)  Source: Clean Catch Clean Catch (Midstream)      Creatinine, Serum: 0.72 mg/dL (11-21-21 @ 06:23)  Creatinine, Serum: 0.76 mg/dL (11-20-21 @ 17:17)            WBC Count: 10.40 K/uL (11-21-21 @ 06:23)  WBC Count: 14.23 K/uL (11-20-21 @ 17:17)      COVID-19 PCR: NotDetec (11-20-21 @ 20:43)      Alkaline Phosphatase, Serum: 77 U/L (11-20-21 @ 17:17)  Alanine Aminotransferase (ALT/SGPT): 8 U/L (11-20-21 @ 17:17)  Aspartate Aminotransferase (AST/SGOT): 10 U/L (11-20-21 @ 17:17)  Bilirubin Total, Serum: 0.4 mg/dL (11-20-21 @ 17:17)   Returned call to patient.  No answer.  Message left to notify patient that the new patient packet will be given when she arrives for appointment tomorrow.

## 2021-12-05 DIAGNOSIS — E78.2 MIXED HYPERLIPIDEMIA: ICD-10-CM

## 2021-12-05 DIAGNOSIS — I10 ESSENTIAL HYPERTENSION: ICD-10-CM

## 2021-12-06 RX ORDER — LOSARTAN POTASSIUM 100 MG/1
TABLET ORAL
Qty: 90 TABLET | Refills: 1 | Status: SHIPPED | OUTPATIENT
Start: 2021-12-06 | End: 2022-03-14 | Stop reason: SDUPTHER

## 2021-12-06 RX ORDER — ATORVASTATIN CALCIUM 40 MG/1
TABLET, FILM COATED ORAL
Qty: 90 TABLET | Refills: 3 | Status: SHIPPED | OUTPATIENT
Start: 2021-12-06

## 2021-12-13 DIAGNOSIS — I10 ESSENTIAL HYPERTENSION: ICD-10-CM

## 2021-12-13 DIAGNOSIS — Z79.4 TYPE 2 DIABETES MELLITUS WITHOUT COMPLICATION, WITH LONG-TERM CURRENT USE OF INSULIN (HCC): ICD-10-CM

## 2021-12-13 DIAGNOSIS — E11.9 TYPE 2 DIABETES MELLITUS WITHOUT COMPLICATION, WITH LONG-TERM CURRENT USE OF INSULIN (HCC): ICD-10-CM

## 2021-12-13 RX ORDER — PIOGLITAZONEHYDROCHLORIDE 30 MG/1
30 TABLET ORAL DAILY
Qty: 90 TABLET | Refills: 1 | Status: SHIPPED | OUTPATIENT
Start: 2021-12-13

## 2021-12-17 DIAGNOSIS — I10 ESSENTIAL HYPERTENSION: ICD-10-CM

## 2021-12-17 RX ORDER — HYDROCHLOROTHIAZIDE 25 MG/1
TABLET ORAL
Qty: 90 TABLET | Refills: 1 | Status: SHIPPED | OUTPATIENT
Start: 2021-12-17

## 2022-01-05 DIAGNOSIS — I10 ESSENTIAL HYPERTENSION: ICD-10-CM

## 2022-01-05 RX ORDER — HYDRALAZINE HYDROCHLORIDE 25 MG/1
TABLET, FILM COATED ORAL
Qty: 180 TABLET | Refills: 1 | Status: SHIPPED | OUTPATIENT
Start: 2022-01-05

## 2022-01-14 DIAGNOSIS — I10 ESSENTIAL HYPERTENSION: ICD-10-CM

## 2022-01-14 RX ORDER — HYDROXYZINE HYDROCHLORIDE 25 MG/1
25 TABLET, FILM COATED ORAL 2 TIMES DAILY PRN
Qty: 180 TABLET | Refills: 0 | Status: SHIPPED | OUTPATIENT
Start: 2022-01-14

## 2022-01-14 NOTE — TELEPHONE ENCOUNTER
Pt needs rescheduled for appt that was supposed to be a follow up from august in 3 months (around 11/17/2021) for DM, lipids, iron def, DM panel prior to appt.    Thanks

## 2022-01-14 NOTE — TELEPHONE ENCOUNTER
Rx Refill Note  Requested Prescriptions     Pending Prescriptions Disp Refills   • hydrOXYzine (ATARAX) 25 MG tablet  0     Sig: Take 1 tablet by mouth 2 (Two) Times a Day As Needed for Anxiety.      Last office visit with prescribing clinician: 11/8/2021      Next office visit with prescribing clinician: Visit date not found            Maria T Castro MA  01/14/22, 09:06 EST     PATIENT REQUESTED A 90 DAY SUPPLY.

## 2022-01-17 DIAGNOSIS — I10 ESSENTIAL HYPERTENSION: ICD-10-CM

## 2022-01-17 RX ORDER — METOPROLOL TARTRATE 50 MG/1
50 TABLET, FILM COATED ORAL 2 TIMES DAILY
Qty: 180 TABLET | Refills: 1 | Status: SHIPPED | OUTPATIENT
Start: 2022-01-17

## 2022-01-18 DIAGNOSIS — I10 ESSENTIAL HYPERTENSION: ICD-10-CM

## 2022-01-18 RX ORDER — METOPROLOL TARTRATE 50 MG/1
TABLET, FILM COATED ORAL
Qty: 180 TABLET | Refills: 1 | OUTPATIENT
Start: 2022-01-18

## 2022-01-24 RX ORDER — DOXYCYCLINE 100 MG/1
100 CAPSULE ORAL 2 TIMES DAILY
Qty: 20 CAPSULE | Refills: 0 | Status: SHIPPED | OUTPATIENT
Start: 2022-01-24 | End: 2022-02-03

## 2022-01-29 DIAGNOSIS — E11.9 TYPE 2 DIABETES MELLITUS WITHOUT COMPLICATION, WITH LONG-TERM CURRENT USE OF INSULIN: ICD-10-CM

## 2022-01-29 DIAGNOSIS — Z79.4 TYPE 2 DIABETES MELLITUS WITHOUT COMPLICATION, WITH LONG-TERM CURRENT USE OF INSULIN: ICD-10-CM

## 2022-01-31 RX ORDER — METFORMIN HYDROCHLORIDE 500 MG/1
TABLET, EXTENDED RELEASE ORAL
Qty: 180 TABLET | Refills: 1 | Status: SHIPPED | OUTPATIENT
Start: 2022-01-31

## 2022-02-07 DIAGNOSIS — Z79.4 TYPE 2 DIABETES MELLITUS WITHOUT COMPLICATION, WITH LONG-TERM CURRENT USE OF INSULIN: ICD-10-CM

## 2022-02-07 DIAGNOSIS — E11.9 TYPE 2 DIABETES MELLITUS WITHOUT COMPLICATION, WITH LONG-TERM CURRENT USE OF INSULIN: ICD-10-CM

## 2022-02-16 ENCOUNTER — OFFICE VISIT (OUTPATIENT)
Dept: FAMILY MEDICINE CLINIC | Facility: CLINIC | Age: 52
End: 2022-02-16

## 2022-02-16 VITALS
WEIGHT: 244 LBS | SYSTOLIC BLOOD PRESSURE: 124 MMHG | BODY MASS INDEX: 38.3 KG/M2 | DIASTOLIC BLOOD PRESSURE: 80 MMHG | OXYGEN SATURATION: 98 % | HEART RATE: 77 BPM | HEIGHT: 67 IN

## 2022-02-16 DIAGNOSIS — Z00.00 PREVENTATIVE HEALTH CARE: ICD-10-CM

## 2022-02-16 DIAGNOSIS — R30.0 DYSURIA: Primary | ICD-10-CM

## 2022-02-16 DIAGNOSIS — E11.9 TYPE 2 DIABETES MELLITUS WITHOUT COMPLICATION, WITH LONG-TERM CURRENT USE OF INSULIN: ICD-10-CM

## 2022-02-16 DIAGNOSIS — Z79.4 TYPE 2 DIABETES MELLITUS WITHOUT COMPLICATION, WITH LONG-TERM CURRENT USE OF INSULIN: ICD-10-CM

## 2022-02-16 LAB
BILIRUB BLD-MCNC: NEGATIVE MG/DL
CLARITY, POC: CLEAR
COLOR UR: YELLOW
GLUCOSE UR STRIP-MCNC: NEGATIVE MG/DL
KETONES UR QL: NEGATIVE
LEUKOCYTE EST, POC: NEGATIVE
NITRITE UR-MCNC: NEGATIVE MG/ML
PH UR: 5 [PH] (ref 5–8)
PROT UR STRIP-MCNC: ABNORMAL MG/DL
RBC # UR STRIP: ABNORMAL /UL
SP GR UR: 1.02 (ref 1–1.03)
UROBILINOGEN UR QL: NORMAL

## 2022-02-16 PROCEDURE — 84443 ASSAY THYROID STIM HORMONE: CPT | Performed by: NURSE PRACTITIONER

## 2022-02-16 PROCEDURE — 80053 COMPREHEN METABOLIC PANEL: CPT | Performed by: NURSE PRACTITIONER

## 2022-02-16 PROCEDURE — 80061 LIPID PANEL: CPT | Performed by: NURSE PRACTITIONER

## 2022-02-16 PROCEDURE — 81002 URINALYSIS NONAUTO W/O SCOPE: CPT | Performed by: NURSE PRACTITIONER

## 2022-02-16 PROCEDURE — 99214 OFFICE O/P EST MOD 30 MIN: CPT | Performed by: NURSE PRACTITIONER

## 2022-02-16 PROCEDURE — 36415 COLL VENOUS BLD VENIPUNCTURE: CPT | Performed by: NURSE PRACTITIONER

## 2022-02-16 PROCEDURE — 87086 URINE CULTURE/COLONY COUNT: CPT | Performed by: NURSE PRACTITIONER

## 2022-02-16 PROCEDURE — 83036 HEMOGLOBIN GLYCOSYLATED A1C: CPT | Performed by: NURSE PRACTITIONER

## 2022-02-16 PROCEDURE — 85025 COMPLETE CBC W/AUTO DIFF WBC: CPT | Performed by: NURSE PRACTITIONER

## 2022-02-16 RX ORDER — PHENAZOPYRIDINE HYDROCHLORIDE 100 MG/1
100 TABLET, FILM COATED ORAL 3 TIMES DAILY PRN
Qty: 30 TABLET | Refills: 0 | Status: SHIPPED | OUTPATIENT
Start: 2022-02-16 | End: 2022-03-07

## 2022-02-16 RX ORDER — NITROFURANTOIN 25; 75 MG/1; MG/1
100 CAPSULE ORAL 2 TIMES DAILY
Qty: 14 CAPSULE | Refills: 0 | Status: SHIPPED | OUTPATIENT
Start: 2022-02-16 | End: 2022-03-07

## 2022-02-16 NOTE — ASSESSMENT & PLAN NOTE
1.  Start Macrobid 100 twice daily x7 days, finish all of medication unless otherwise instructed  2.  Send for urine culture, will notify patient of results when available  3.  Push fluids, avoid caffeine  4.  Start Pyridium 100 mg three times daily as needed for bladder pain or spasm  5.  Call with new or worsening symptoms

## 2022-02-16 NOTE — PROGRESS NOTES
"Chief Complaint  Difficulty Urinating (just treated UTI a little over a week ago, bht now coming back ), Spasms (bladder spasms), and Back Pain    Subjective          Rosita Siegel presents to [facility]  History of Present Illness    Patient presents bilateral flank pain, burning with urination and bladder spasms at the end of urination. Urine is dark, patient currently on menstrual cycle.  She reports symptoms started over one week ago, had Cipro at home and took 5 days worth.  She reports symptoms improved but then returned within 24 hours of stopping medication.  Patient does have history of DMII, A1c last checked in August 2021.    Objective   Vital Signs:   /80 (BP Location: Left arm, Patient Position: Sitting, Cuff Size: Large Adult)   Pulse 77   Ht 170.2 cm (67\")   Wt 111 kg (244 lb)   SpO2 98%   BMI 38.22 kg/m²       Physical Exam  Constitutional:       Appearance: Normal appearance.   HENT:      Head: Normocephalic.   Cardiovascular:      Rate and Rhythm: Normal rate and regular rhythm.   Pulmonary:      Effort: Pulmonary effort is normal.      Breath sounds: Normal breath sounds.   Abdominal:      General: Abdomen is flat. Bowel sounds are normal.      Palpations: Abdomen is soft.      Tenderness: There is no right CVA tenderness or left CVA tenderness.   Musculoskeletal:         General: Normal range of motion.      Cervical back: Neck supple.      Right lower leg: No edema.      Left lower leg: No edema.   Skin:     General: Skin is warm and dry.   Neurological:      Mental Status: She is alert and oriented to person, place, and time.      Gait: Gait is intact.   Psychiatric:         Attention and Perception: Attention normal.         Mood and Affect: Mood normal.         Speech: Speech normal.        Result Review :     CMP    CMP 4/29/21 6/16/21 8/10/21   Glucose 195 (A)  156 (A)   BUN 19  15   Creatinine 1.21 (A)  1.21 (A)   eGFR Non  Am 47 (A)  47 " (A)   Sodium 138  139   Potassium 3.8  3.4 (A)   Chloride 100  97 (A)   Calcium 9.2  8.6   Total Protein  7.3    Albumin 4.40 3.7 4.10   Globulin  3.6    Total Bilirubin 0.3  0.2   Alkaline Phosphatase 118 (A)  93   AST (SGOT) 65 (A)  29   ALT (SGPT) 39 (A)  21   (A) Abnormal value            CBC    CBC 8/10/21 9/15/21 10/27/21   WBC 9.60 11.06 (A) 7.93   RBC 4.43 4.89 4.74   Hemoglobin 9.7 (A) 12.5 13.0   Hematocrit 31.5 (A) 40.6 40.8   MCV 71.1 (A) 83.0 86.1   MCH 21.9 (A) 25.6 (A) 27.4   MCHC 30.8 (A) 30.8 (A) 31.9   RDW 16.9 (A) 25.8 (A) 21.9 (A)   Platelets 496 (A) 427 377   (A) Abnormal value            Lipid Panel    Lipid Panel 4/29/21 8/10/21   Total Cholesterol 181 150   Triglycerides 277 (A) 285 (A)   HDL Cholesterol 29 (A) 38 (A)   VLDL Cholesterol 48 (A) 46 (A)   LDL Cholesterol  104 (A) 66   LDL/HDL Ratio 3.33 1.45   (A) Abnormal value            TSH    TSH 4/29/21   TSH 2.360           Most Recent A1C    HGBA1C Most Recent 8/10/21   Hemoglobin A1C 7.7 (A)   (A) Abnormal value                      Assessment and Plan    Diagnoses and all orders for this visit:    1. Dysuria (Primary)  Assessment & Plan:  1.  Start Macrobid 100 twice daily x7 days, finish all of medication unless otherwise instructed  2.  Send for urine culture, will notify patient of results when available  3.  Push fluids, avoid caffeine  4.  Start Pyridium 100 mg three times daily as needed for bladder pain or spasm  5.  Call with new or worsening symptoms    Orders:  -     POCT urinalysis dipstick, manual  -     Urine Culture - Urine, Urine, Clean Catch    2. Type 2 diabetes mellitus without complication, with long-term current use of insulin (HCC)  Assessment & Plan:  1.  Check labs  2.  Advised patient to schedule follow-up with Loren    Orders:  -     Hemoglobin A1c    3. Preventative health care  Assessment & Plan:  1.  Check labs    Orders:  -     CBC & Differential  -     Comprehensive Metabolic Panel  -     Lipid Panel  -      TSH    Other orders  -     nitrofurantoin, macrocrystal-monohydrate, (Macrobid) 100 MG capsule; Take 1 capsule by mouth 2 (Two) Times a Day.  Dispense: 14 capsule; Refill: 0  -     phenazopyridine (Pyridium) 100 MG tablet; Take 1 tablet by mouth 3 (Three) Times a Day As Needed for Bladder Spasms.  Dispense: 30 tablet; Refill: 0    I spent 22 minutes caring for Rosita on this date of service. This time includes time spent by me in the following activities:preparing for the visit, reviewing tests, obtaining and/or reviewing a separately obtained history, performing a medically appropriate examination and/or evaluation , counseling and educating the patient/family/caregiver, ordering medications, tests, or procedures, documenting information in the medical record, independently interpreting results and communicating that information with the patient/family/caregiver and care coordination  Follow Up   Return in about 2 weeks (around 3/2/2022) for DMII, HTN.  Patient was given instructions and counseling regarding her condition or for health maintenance advice. Please see specific information pulled into the AVS if appropriate.

## 2022-02-17 ENCOUNTER — TELEPHONE (OUTPATIENT)
Dept: FAMILY MEDICINE CLINIC | Facility: CLINIC | Age: 52
End: 2022-02-17

## 2022-02-17 LAB
ALBUMIN SERPL-MCNC: 4.8 G/DL (ref 3.5–5.2)
ALBUMIN/GLOB SERPL: 1.9 G/DL
ALP SERPL-CCNC: 62 U/L (ref 39–117)
ALT SERPL W P-5'-P-CCNC: 44 U/L (ref 1–33)
ANION GAP SERPL CALCULATED.3IONS-SCNC: 12.4 MMOL/L (ref 5–15)
AST SERPL-CCNC: 86 U/L (ref 1–32)
BACTERIA SPEC AEROBE CULT: NORMAL
BASOPHILS # BLD AUTO: 0.08 10*3/MM3 (ref 0–0.2)
BASOPHILS NFR BLD AUTO: 1.1 % (ref 0–1.5)
BILIRUB SERPL-MCNC: 0.4 MG/DL (ref 0–1.2)
BUN SERPL-MCNC: 17 MG/DL (ref 6–20)
BUN/CREAT SERPL: 10.1 (ref 7–25)
CALCIUM SPEC-SCNC: 10 MG/DL (ref 8.6–10.5)
CHLORIDE SERPL-SCNC: 99 MMOL/L (ref 98–107)
CHOLEST SERPL-MCNC: 187 MG/DL (ref 0–200)
CO2 SERPL-SCNC: 25.6 MMOL/L (ref 22–29)
CREAT SERPL-MCNC: 1.68 MG/DL (ref 0.57–1)
DEPRECATED RDW RBC AUTO: 35.3 FL (ref 37–54)
EOSINOPHIL # BLD AUTO: 0.24 10*3/MM3 (ref 0–0.4)
EOSINOPHIL NFR BLD AUTO: 3.3 % (ref 0.3–6.2)
ERYTHROCYTE [DISTWIDTH] IN BLOOD BY AUTOMATED COUNT: 11.8 % (ref 12.3–15.4)
GFR SERPL CREATININE-BSD FRML MDRD: 32 ML/MIN/1.73
GLOBULIN UR ELPH-MCNC: 2.5 GM/DL
GLUCOSE SERPL-MCNC: 145 MG/DL (ref 65–99)
HBA1C MFR BLD: 7.6 % (ref 3.5–5.6)
HCT VFR BLD AUTO: 39.4 % (ref 34–46.6)
HDLC SERPL-MCNC: 30 MG/DL (ref 40–60)
HGB BLD-MCNC: 13 G/DL (ref 12–15.9)
IMM GRANULOCYTES # BLD AUTO: 0.04 10*3/MM3 (ref 0–0.05)
IMM GRANULOCYTES NFR BLD AUTO: 0.5 % (ref 0–0.5)
LDLC SERPL CALC-MCNC: 105 MG/DL (ref 0–100)
LDLC/HDLC SERPL: 3.23 {RATIO}
LYMPHOCYTES # BLD AUTO: 2.41 10*3/MM3 (ref 0.7–3.1)
LYMPHOCYTES NFR BLD AUTO: 33 % (ref 19.6–45.3)
MCH RBC QN AUTO: 27.6 PG (ref 26.6–33)
MCHC RBC AUTO-ENTMCNC: 33 G/DL (ref 31.5–35.7)
MCV RBC AUTO: 83.7 FL (ref 79–97)
MONOCYTES # BLD AUTO: 0.39 10*3/MM3 (ref 0.1–0.9)
MONOCYTES NFR BLD AUTO: 5.3 % (ref 5–12)
NEUTROPHILS NFR BLD AUTO: 4.15 10*3/MM3 (ref 1.7–7)
NEUTROPHILS NFR BLD AUTO: 56.8 % (ref 42.7–76)
NRBC BLD AUTO-RTO: 0 /100 WBC (ref 0–0.2)
PLATELET # BLD AUTO: 429 10*3/MM3 (ref 140–450)
PMV BLD AUTO: 10.9 FL (ref 6–12)
POTASSIUM SERPL-SCNC: 4.2 MMOL/L (ref 3.5–5.2)
PROT SERPL-MCNC: 7.3 G/DL (ref 6–8.5)
RBC # BLD AUTO: 4.71 10*6/MM3 (ref 3.77–5.28)
SODIUM SERPL-SCNC: 137 MMOL/L (ref 136–145)
TRIGL SERPL-MCNC: 301 MG/DL (ref 0–150)
TSH SERPL DL<=0.05 MIU/L-ACNC: 1.45 UIU/ML (ref 0.27–4.2)
VLDLC SERPL-MCNC: 52 MG/DL (ref 5–40)
WBC NRBC COR # BLD: 7.31 10*3/MM3 (ref 3.4–10.8)

## 2022-02-17 NOTE — TELEPHONE ENCOUNTER
----- Message from ADRIANA Waller sent at 2/17/2022 12:03 PM EST -----  Urine culture shows a large colony count of normal tamara.  Due to patient's acute symptoms, she can finish the antibiotic.  I would recommend that she drink more water and should call if symptoms persist or return after finishing regimen

## 2022-02-17 NOTE — PROGRESS NOTES
Urine culture shows a large colony count of normal tamara.  Due to patient's acute symptoms, she can finish the antibiotic.  I would recommend that she drink more water and should call if symptoms persist or return after finishing regimen

## 2022-02-17 NOTE — TELEPHONE ENCOUNTER
Left message for patient to call regarding results.    HUB TO READ:  Urine culture shows a large colony count of normal tamara.  Due to patient's acute symptoms, she can finish the antibiotic.  I would recommend that she drink more water and should call if symptoms persist or return after finishing regimen

## 2022-02-22 ENCOUNTER — OFFICE VISIT (OUTPATIENT)
Dept: FAMILY MEDICINE CLINIC | Facility: CLINIC | Age: 52
End: 2022-02-22

## 2022-02-22 VITALS
SYSTOLIC BLOOD PRESSURE: 128 MMHG | BODY MASS INDEX: 37.98 KG/M2 | TEMPERATURE: 98.1 F | OXYGEN SATURATION: 98 % | HEART RATE: 88 BPM | HEIGHT: 67 IN | DIASTOLIC BLOOD PRESSURE: 80 MMHG | WEIGHT: 242 LBS

## 2022-02-22 DIAGNOSIS — K58.0 IRRITABLE BOWEL SYNDROME WITH DIARRHEA: ICD-10-CM

## 2022-02-22 DIAGNOSIS — R30.0 DYSURIA: ICD-10-CM

## 2022-02-22 DIAGNOSIS — R53.83 OTHER FATIGUE: Primary | ICD-10-CM

## 2022-02-22 DIAGNOSIS — E55.9 VITAMIN D DEFICIENCY: ICD-10-CM

## 2022-02-22 PROCEDURE — 82306 VITAMIN D 25 HYDROXY: CPT | Performed by: NURSE PRACTITIONER

## 2022-02-22 PROCEDURE — 85652 RBC SED RATE AUTOMATED: CPT | Performed by: NURSE PRACTITIONER

## 2022-02-22 PROCEDURE — 80048 BASIC METABOLIC PNL TOTAL CA: CPT | Performed by: NURSE PRACTITIONER

## 2022-02-22 PROCEDURE — 86140 C-REACTIVE PROTEIN: CPT | Performed by: NURSE PRACTITIONER

## 2022-02-22 PROCEDURE — 82607 VITAMIN B-12: CPT | Performed by: NURSE PRACTITIONER

## 2022-02-22 PROCEDURE — 36415 COLL VENOUS BLD VENIPUNCTURE: CPT | Performed by: NURSE PRACTITIONER

## 2022-02-22 PROCEDURE — 87086 URINE CULTURE/COLONY COUNT: CPT | Performed by: NURSE PRACTITIONER

## 2022-02-22 PROCEDURE — 81001 URINALYSIS AUTO W/SCOPE: CPT | Performed by: NURSE PRACTITIONER

## 2022-02-22 PROCEDURE — 99214 OFFICE O/P EST MOD 30 MIN: CPT | Performed by: NURSE PRACTITIONER

## 2022-02-22 RX ORDER — FENOFIBRATE 145 MG/1
TABLET, COATED ORAL
Qty: 90 TABLET | Refills: 1 | Status: SHIPPED | OUTPATIENT
Start: 2022-02-22

## 2022-02-22 NOTE — ASSESSMENT & PLAN NOTE
1.  Repeat urinalysis  2.  Repeat BMP  3.  If negative any kidney function remains poor, consider renal ultrasound

## 2022-02-22 NOTE — PROGRESS NOTES
"Chief Complaint  Difficulty Urinating, Back Pain, Fatigue (severe fatigue with brain fog (had covid a few weeks ago)), and Fever (on and off )    Subjective          Rosita Siegel presents to [facility]  History of Present Illness    Patient presents with painful urination and bladder spasms. She was seen for the same symptoms last week, started on Macrobid.  Urine culture showed normal tamara greater than 100,000.  Patient reports initial improvement on Macrobid but symptoms persist.  She also continues to complain of bilateral flank plain.  Patient is c/o low grade fever nightly, 99 to 100 degrees. Patient had COVID-19 on January 23.  Patient also c/o headaches, severe fatigue and brain fog.  Patient has intermittent diarrhea, history of IBS.    Objective   Vital Signs:   /80 (BP Location: Left arm, Patient Position: Sitting, Cuff Size: Large Adult)   Pulse 88   Temp 98.1 °F (36.7 °C) (Oral)   Ht 170.2 cm (67\")   Wt 110 kg (242 lb)   SpO2 98%   BMI 37.90 kg/m²       Physical Exam  Constitutional:       Appearance: Normal appearance.   HENT:      Head: Normocephalic.   Cardiovascular:      Rate and Rhythm: Normal rate and regular rhythm.   Pulmonary:      Effort: Pulmonary effort is normal.      Breath sounds: Normal breath sounds.   Abdominal:      General: Abdomen is flat. Bowel sounds are normal.      Palpations: Abdomen is soft.      Tenderness: There is left CVA tenderness. There is no right CVA tenderness.   Musculoskeletal:         General: Normal range of motion.      Cervical back: Neck supple.      Right lower leg: No edema.      Left lower leg: No edema.   Skin:     General: Skin is warm and dry.   Neurological:      Mental Status: She is alert and oriented to person, place, and time.      Gait: Gait is intact.   Psychiatric:         Attention and Perception: Attention normal.         Mood and Affect: Mood normal.         Speech: Speech normal.        Result " Review :     CMP    CMP 6/16/21 8/10/21 2/16/22   Glucose  156 (A) 145 (A)   BUN  15 17   Creatinine  1.21 (A) 1.68 (A)   eGFR Non  Am  47 (A) 32 (A)   Sodium  139 137   Potassium  3.4 (A) 4.2   Chloride  97 (A) 99   Calcium  8.6 10.0   Total Protein 7.3     Albumin 3.7 4.10 4.80   Globulin 3.6     Total Bilirubin  0.2 0.4   Alkaline Phosphatase  93 62   AST (SGOT)  29 86 (A)   ALT (SGPT)  21 44 (A)   (A) Abnormal value            CBC    CBC 9/15/21 10/27/21 2/16/22   WBC 11.06 (A) 7.93 7.31   RBC 4.89 4.74 4.71   Hemoglobin 12.5 13.0 13.0   Hematocrit 40.6 40.8 39.4   MCV 83.0 86.1 83.7   MCH 25.6 (A) 27.4 27.6   MCHC 30.8 (A) 31.9 33.0   RDW 25.8 (A) 21.9 (A) 11.8 (A)   Platelets 427 377 429   (A) Abnormal value            Lipid Panel    Lipid Panel 4/29/21 8/10/21 2/16/22   Total Cholesterol 181 150 187   Triglycerides 277 (A) 285 (A) 301 (A)   HDL Cholesterol 29 (A) 38 (A) 30 (A)   VLDL Cholesterol 48 (A) 46 (A) 52 (A)   LDL Cholesterol  104 (A) 66 105 (A)   LDL/HDL Ratio 3.33 1.45 3.23   (A) Abnormal value            TSH    TSH 4/29/21 2/16/22   TSH 2.360 1.450           Most Recent A1C    HGBA1C Most Recent 2/16/22   Hemoglobin A1C 7.6 (A)   (A) Abnormal value                      Assessment and Plan    Diagnoses and all orders for this visit:    1. Other fatigue (Primary)  Assessment & Plan:  1.  Check vitamin B12 and vitamin D level  2.  Possible long hauler from COVID-19    Orders:  -     Vitamin B12  -     Basic Metabolic Panel  -     Sedimentation rate, automated  -     C-reactive protein    2. Vitamin D deficiency  -     Vitamin D 25 Hydroxy    3. Irritable bowel syndrome with diarrhea  Assessment & Plan:  1.  Check inflammatory markers    Orders:  -     Sedimentation rate, automated  -     C-reactive protein    4. Dysuria  Assessment & Plan:  1.  Repeat urinalysis  2.  Repeat BMP  3.  If negative any kidney function remains poor, consider renal ultrasound    Orders:  -     Urinalysis With Culture  If Indicated - Urine, Clean Catch    I spent 30 minutes caring for Rosita on this date of service. This time includes time spent by me in the following activities:preparing for the visit, reviewing tests, obtaining and/or reviewing a separately obtained history, performing a medically appropriate examination and/or evaluation , counseling and educating the patient/family/caregiver, ordering medications, tests, or procedures, documenting information in the medical record, independently interpreting results and communicating that information with the patient/family/caregiver and care coordination  Follow Up   Return for Next scheduled follow up.  Patient was given instructions and counseling regarding her condition or for health maintenance advice. Please see specific information pulled into the AVS if appropriate.

## 2022-02-23 LAB
25(OH)D3 SERPL-MCNC: 5.9 NG/ML (ref 30–100)
ANION GAP SERPL CALCULATED.3IONS-SCNC: 18.3 MMOL/L (ref 5–15)
BACTERIA UR QL AUTO: ABNORMAL /HPF
BILIRUB UR QL STRIP: NEGATIVE
BUN SERPL-MCNC: 22 MG/DL (ref 6–20)
BUN/CREAT SERPL: 14.6 (ref 7–25)
CALCIUM SPEC-SCNC: 10.8 MG/DL (ref 8.6–10.5)
CHLORIDE SERPL-SCNC: 97 MMOL/L (ref 98–107)
CLARITY UR: ABNORMAL
CO2 SERPL-SCNC: 24.7 MMOL/L (ref 22–29)
COLOR UR: ABNORMAL
CREAT SERPL-MCNC: 1.51 MG/DL (ref 0.57–1)
CRP SERPL-MCNC: 0.61 MG/DL (ref 0–0.5)
ERYTHROCYTE [SEDIMENTATION RATE] IN BLOOD: 35 MM/HR (ref 0–20)
GFR SERPL CREATININE-BSD FRML MDRD: 36 ML/MIN/1.73
GLUCOSE SERPL-MCNC: 122 MG/DL (ref 65–99)
GLUCOSE UR STRIP-MCNC: NEGATIVE MG/DL
HGB UR QL STRIP.AUTO: NEGATIVE
HYALINE CASTS UR QL AUTO: ABNORMAL /LPF
KETONES UR QL STRIP: NEGATIVE
LEUKOCYTE ESTERASE UR QL STRIP.AUTO: NEGATIVE
NITRITE UR QL STRIP: POSITIVE
PH UR STRIP.AUTO: 7 [PH] (ref 5–8)
POTASSIUM SERPL-SCNC: 4.3 MMOL/L (ref 3.5–5.2)
PROT UR QL STRIP: ABNORMAL
RBC # UR STRIP: ABNORMAL /HPF
REF LAB TEST METHOD: ABNORMAL
SODIUM SERPL-SCNC: 140 MMOL/L (ref 136–145)
SP GR UR STRIP: 1.02 (ref 1–1.03)
SQUAMOUS #/AREA URNS HPF: ABNORMAL /HPF
UROBILINOGEN UR QL STRIP: ABNORMAL
VIT B12 BLD-MCNC: 661 PG/ML (ref 211–946)
WBC # UR STRIP: ABNORMAL /HPF

## 2022-02-23 NOTE — PROGRESS NOTES
Patient's Vitamin D level is low at 5.9. this is very low. I am starting her at 5000 units daily. Improvement in kidney function but not yet returned to her baseline. Push water. Urine has reflexed to a culture, indicating possible UTI. We will call once resulted.

## 2022-02-24 LAB — BACTERIA SPEC AEROBE CULT: NORMAL

## 2022-02-24 NOTE — PROGRESS NOTES
Urine culture shows normal tamara, no signs of infection. Inflammatory markers could be elevated due to recent COVID infection. I want her to start the 5000 units of Vitamin D and keep appointment next week with Loren to f/u and discuss repeating labs or further workup if indicated. If dysuria persists, I would consider urology referral.

## 2022-03-01 ENCOUNTER — TELEPHONE (OUTPATIENT)
Dept: ONCOLOGY | Facility: CLINIC | Age: 52
End: 2022-03-01

## 2022-03-01 NOTE — TELEPHONE ENCOUNTER
"Caller: Rosita Siegel    Relationship to patient: Self    Best call back number: [phone]    Chief complaint: CANC. APPTS. DUE TO NO INS, DIRECTED HER TO \"NO SURPRISES\".    Type of visit: LAB/FOLLOW UP 1    Requested date: WILL CALL BACK TO HILDA.    When is the original appointment: 3/8/2022              "

## 2022-03-07 ENCOUNTER — OFFICE VISIT (OUTPATIENT)
Dept: FAMILY MEDICINE CLINIC | Facility: CLINIC | Age: 52
End: 2022-03-07

## 2022-03-07 VITALS
BODY MASS INDEX: 37.92 KG/M2 | HEART RATE: 60 BPM | DIASTOLIC BLOOD PRESSURE: 76 MMHG | OXYGEN SATURATION: 97 % | SYSTOLIC BLOOD PRESSURE: 124 MMHG | WEIGHT: 241.6 LBS | HEIGHT: 67 IN

## 2022-03-07 DIAGNOSIS — K21.9 GASTROESOPHAGEAL REFLUX DISEASE WITHOUT ESOPHAGITIS: ICD-10-CM

## 2022-03-07 DIAGNOSIS — E55.9 VITAMIN D DEFICIENCY: ICD-10-CM

## 2022-03-07 DIAGNOSIS — Z79.4 CONTROLLED TYPE 2 DIABETES MELLITUS WITH HYPERGLYCEMIA, WITH LONG-TERM CURRENT USE OF INSULIN: Primary | ICD-10-CM

## 2022-03-07 DIAGNOSIS — D50.9 IRON DEFICIENCY ANEMIA, UNSPECIFIED IRON DEFICIENCY ANEMIA TYPE: ICD-10-CM

## 2022-03-07 DIAGNOSIS — F31.9 BIPOLAR AFFECTIVE DISORDER, REMISSION STATUS UNSPECIFIED: ICD-10-CM

## 2022-03-07 DIAGNOSIS — D17.22 LIPOMA OF LEFT UPPER EXTREMITY: ICD-10-CM

## 2022-03-07 DIAGNOSIS — E78.2 MIXED HYPERLIPIDEMIA: ICD-10-CM

## 2022-03-07 DIAGNOSIS — I10 PRIMARY HYPERTENSION: ICD-10-CM

## 2022-03-07 DIAGNOSIS — F41.1 GENERALIZED ANXIETY DISORDER: ICD-10-CM

## 2022-03-07 DIAGNOSIS — K58.2 IRRITABLE BOWEL SYNDROME WITH BOTH CONSTIPATION AND DIARRHEA: ICD-10-CM

## 2022-03-07 DIAGNOSIS — N18.32 CHRONIC RENAL IMPAIRMENT, STAGE 3B: ICD-10-CM

## 2022-03-07 DIAGNOSIS — E11.65 CONTROLLED TYPE 2 DIABETES MELLITUS WITH HYPERGLYCEMIA, WITH LONG-TERM CURRENT USE OF INSULIN: Primary | ICD-10-CM

## 2022-03-07 PROCEDURE — 99213 OFFICE O/P EST LOW 20 MIN: CPT | Performed by: NURSE PRACTITIONER

## 2022-03-07 NOTE — PROGRESS NOTES
What digital decide?  Chief Complaint  Diabetes, Hypertension, and Follow-up (2 wk; pt also has a lump under the skin of her left arm.  Pt also wanted to make sure she has refills of medications because she'll be moving soon.  She would like us to send meds to pharmacy in [place] until she sees new dr.)    Santosh          Rosita Siegel presents to [facility] for   History of Present Illness     Patient reports she has lost her job and therefore insurance.  She will be moving to [place] soon, and planning to be seen at the [facility] later this week    Diabetes mellitus type II, feels stable on meds, denies any signs/symptoms of hyper/hypoglycemia, blurry vision, polydipsia, polyuria, nocturia, and unintentional weight loss    HTN, stable on meds and takes as directed, denies chest pain, headache, shortness of air, palpitations and swelling of extremities.     Lump on left arm, nontender.     Iron deficiency anemia, on ferrous sulfate daily    Vitamin D deficiency, 5.9 in feb, on 5000 units daily    Hyperlipidemia, The patient denies muscle aches, constipation, diarrhea, GI upset, fatigue, chest pain/pressure, exercise intolerance, dyspnea, palpitations, syncope and pedal edema.      GERD, stable on medication, denies nausea, vomiting, constipation, abdominal pain and diarrhea.    Depression, Anxiety, lost job, moving to [place], going to [facility] on 3/9. Patient reports insomnia, fatigue, feelings of worthlessness/guilt.  Denies significant weight loss/gain, hypersomnia, psychomotor agitation, psychomotor retardation, impaired concentration (indecisiveness), thoughts of death or suicide.       Here to review labs collected on 2/16/2022        The following portions of the patient's history were reviewed and updated as appropriate: allergies, current medications, past family history, past medical history, past social history, past surgical history and problem list.    Past Medical  History:   Diagnosis Date   • Allergies    • Depression    • Diabetes mellitus (HCC)    • GERD (gastroesophageal reflux disease)    • Hyperlipidemia    • Hypertension    • Pneumonia      Past Surgical History:   Procedure Laterality Date   • COLONOSCOPY       Family History   Problem Relation Age of Onset   • Heart failure Mother    • Cancer Father    • Heart failure Father    • Ovarian cancer Maternal Grandmother      Social History     Tobacco Use   • Smoking status: Never Smoker   • Smokeless tobacco: Never Used   Substance Use Topics   • Alcohol use: No       Current Outpatient Medications:   •  albuterol sulfate  (90 Base) MCG/ACT inhaler, Inhale 2 puffs Every 4 (Four) Hours As Needed for Wheezing or Shortness of Air., Disp: 1 inhaler, Rfl: 0  •  Ascorbic Acid (VITAMIN C PO), Take  by mouth., Disp: , Rfl:   •  atorvastatin (LIPITOR) 40 MG tablet, TAKE ONE TABLET BY MOUTH EVERY EVENING, Disp: 90 tablet, Rfl: 3  •  Blood Glucose Monitoring Suppl (OneTouch Verio Flex System) w/Device kit, 1 each Take As Directed. To check blood glucose BID, Disp: 1 kit, Rfl: 0  •  Cetirizine HCl 10 MG capsule, ZYRTEC ALLERGY 10 MG CAPS, Disp: , Rfl:   •  desvenlafaxine (PRISTIQ) 100 MG 24 hr tablet, Take 100 mg by mouth Daily., Disp: , Rfl:   •  esomeprazole (nexIUM) 40 MG capsule, Take 1 capsule by mouth Every Morning Before Breakfast., Disp: 90 capsule, Rfl: 1  •  fenofibrate (TRICOR) 145 MG tablet, TAKE ONE TABLET BY MOUTH DAILY, Disp: 90 tablet, Rfl: 1  •  ferrous sulfate 325 (65 FE) MG tablet, Take 1 tablet by mouth Daily With Breakfast., Disp: 90 tablet, Rfl: 1  •  glucose blood (OneTouch Verio) test strip, Use onetouch verio flex strips as instructed to check to check blood glucose BID, Disp: 100 each, Rfl: 12  •  hydrALAZINE (APRESOLINE) 25 MG tablet, TAKE ONE TABLET BY MOUTH TWICE A DAY, Disp: 180 tablet, Rfl: 1  •  hydroCHLOROthiazide (HYDRODIURIL) 25 MG tablet, TAKE ONE TABLET BY MOUTH DAILY, Disp: 90 tablet,  "Rfl: 1  •  hydrOXYzine (ATARAX) 25 MG tablet, Take 1 tablet by mouth 2 (Two) Times a Day As Needed for Anxiety., Disp: 180 tablet, Rfl: 0  •  Hyoscyamine Sulfate SL (Levsin/SL) 0.125 MG sublingual tablet, Place 0.125 mg under the tongue Every 4 (Four) Hours As Needed (diarrhea)., Disp: 90 each, Rfl: 0  •  insulin degludec (TRESIBA FLEXTOUCH) 100 UNIT/ML solution pen-injector injection, Inject 30 Units under the skin into the appropriate area as directed Daily., Disp: 3 pen, Rfl: 5  •  lamoTRIgine (LaMICtal) 100 MG tablet, Take 1 tablet by mouth Daily., Disp: 90 tablet, Rfl: 0  •  losartan (COZAAR) 100 MG tablet, TAKE ONE TABLET BY MOUTH DAILY, Disp: 90 tablet, Rfl: 1  •  MAGNESIUM PO, Take  by mouth., Disp: , Rfl:   •  metFORMIN ER (GLUCOPHAGE-XR) 500 MG 24 hr tablet, TAKE ONE TABLET BY MOUTH TWICE A DAY, Disp: 180 tablet, Rfl: 1  •  metoprolol tartrate (LOPRESSOR) 50 MG tablet, Take 1 tablet by mouth 2 (Two) Times a Day., Disp: 180 tablet, Rfl: 1  •  metroNIDAZOLE (Metrogel) 1 % gel, Apply  topically to the appropriate area as directed Daily., Disp: 60 g, Rfl: 0  •  pioglitazone (Actos) 30 MG tablet, Take 1 tablet by mouth Daily., Disp: 90 tablet, Rfl: 1  •  POTASSIUM PO, Take  by mouth., Disp: , Rfl:   •  vitamin D3 (vitamin d) 125 MCG (5000 UT) capsule capsule, Take 1 capsule by mouth Daily., Disp: 30 capsule, Rfl: 5    Objective   Vital Signs:   /76 (BP Location: Left arm, Patient Position: Sitting, Cuff Size: Large Adult)   Pulse 60   Ht 170.2 cm (67.01\")   Wt 110 kg (241 lb 9.6 oz)   SpO2 97%   BMI 37.83 kg/m²       Physical Exam  Vitals and nursing note reviewed.   Constitutional:       General: She is not in acute distress.     Appearance: She is well-developed. She is not diaphoretic.   Eyes:      Pupils: Pupils are equal, round, and reactive to light.   Neck:      Thyroid: No thyromegaly.      Vascular: No JVD.   Cardiovascular:      Rate and Rhythm: Normal rate and regular rhythm.      Heart " sounds: Normal heart sounds. No murmur heard.  Pulmonary:      Effort: Pulmonary effort is normal. No respiratory distress.      Breath sounds: Normal breath sounds.   Abdominal:      General: Bowel sounds are normal. There is no distension.      Palpations: Abdomen is soft.      Tenderness: There is no abdominal tenderness.   Musculoskeletal:         General: No tenderness. Normal range of motion.      Cervical back: Normal range of motion and neck supple.   Skin:     General: Skin is warm and dry.      Comments: Fatty, mobile cyst, likely lipoma left forearm.  Nontender   Neurological:      Mental Status: She is alert and oriented to person, place, and time.      Sensory: No sensory deficit.   Psychiatric:         Behavior: Behavior normal.         Thought Content: Thought content normal.         Judgment: Judgment normal.          Result Review :     No visits with results within 7 Day(s) from this visit.   Latest known visit with results is:   Office Visit on 02/22/2022   Component Date Value Ref Range Status   • Vitamin B-12 02/22/2022 661  211 - 946 pg/mL Final   • 25 Hydroxy, Vitamin D 02/22/2022 5.9 (A) 30.0 - 100.0 ng/ml Final   • Glucose 02/22/2022 122 (A) 65 - 99 mg/dL Final   • BUN 02/22/2022 22 (A) 6 - 20 mg/dL Final   • Creatinine 02/22/2022 1.51 (A) 0.57 - 1.00 mg/dL Final   • Sodium 02/22/2022 140  136 - 145 mmol/L Final   • Potassium 02/22/2022 4.3  3.5 - 5.2 mmol/L Final   • Chloride 02/22/2022 97 (A) 98 - 107 mmol/L Final   • CO2 02/22/2022 24.7  22.0 - 29.0 mmol/L Final   • Calcium 02/22/2022 10.8 (A) 8.6 - 10.5 mg/dL Final   • eGFR Non  Amer 02/22/2022 36 (A) >60 mL/min/1.73 Final   • BUN/Creatinine Ratio 02/22/2022 14.6  7.0 - 25.0 Final   • Anion Gap 02/22/2022 18.3 (A) 5.0 - 15.0 mmol/L Final   • Sed Rate 02/22/2022 35 (A) 0 - 20 mm/hr Final   • C-Reactive Protein 02/22/2022 0.61 (A) 0.00 - 0.50 mg/dL Final   • Color, UA 02/22/2022 Dark Yellow (A) Yellow, Straw Final   • Appearance, UA  02/22/2022 Slightly Cloudy (A) Clear Final   • pH, UA 02/22/2022 7.0  5.0 - 8.0 Final   • Specific Gravity, UA 02/22/2022 1.020  1.005 - 1.030 Final   • Glucose, UA 02/22/2022 Negative  Negative Final   • Ketones, UA 02/22/2022 Negative  Negative Final   • Bilirubin, UA 02/22/2022 Negative  Negative Final   • Blood, UA 02/22/2022 Negative  Negative Final   • Protein, UA 02/22/2022 30 mg/dL (1+) (A) Negative Final   • Leuk Esterase, UA 02/22/2022 Negative  Negative Final   • Nitrite, UA 02/22/2022 Positive (A) Negative Final   • Urobilinogen, UA 02/22/2022 1.0 E.U./dL  0.2 - 1.0 E.U./dL Final   • Urine Culture 02/22/2022 25,000 CFU/mL Normal Urogenital Dacia   Final   • RBC, UA 02/22/2022 0-2  None Seen, 0-2 /HPF Final   • WBC, UA 02/22/2022 3-5 (A) None Seen, 0-2 /HPF Final   • Bacteria, UA 02/22/2022 2+ (A) None Seen /HPF Final   • Squamous Epithelial Cells, UA 02/22/2022 13-20 (A) None Seen, 0-2 /HPF Final   • Hyaline Casts, UA 02/22/2022 0-2  None Seen /LPF Final   • Methodology 02/22/2022 Automated Microscopy   Final                       Assessment and Plan    Diagnoses and all orders for this visit:    1. Controlled type 2 diabetes mellitus with hyperglycemia, with long-term current use of insulin ([facility]) (Primary)  Comments:  Continue Metformin XR, Actos, Tresiba 30 units daily, provided samples.     2. Primary hypertension    3. Iron deficiency anemia, unspecified iron deficiency anemia type  Comments:  Continue iron    4. Bipolar affective disorder, remission status unspecified ([facility])    5. Chronic renal impairment, stage 3b ([facility])  Comments:  Recommend no NSAIDs, increase water intake.  Should have all labs recollected in 1-2 wks when insurance is effective in [place]    6. Lipoma of left upper extremity    7. Generalized anxiety disorder    8. Vitamin D deficiency    9. Irritable bowel syndrome with both constipation and diarrhea    10. Mixed hyperlipidemia    11. Gastroesophageal reflux disease without  esophagitis      -Conditions are mostly stable  -Continue all medications, patient has refills available for the next 3 to 4 months  -Moving to [place], see the [facility] on 3/9 as scheduled  -Notify of any concerns or questions in the meantime while awaiting insurance approval    I spent 20 minutes caring for Rosita Siegel on this date of service. This time includes time spent by me in the following activities: preparing for the visit, reviewing tests, performing a medically appropriate examination and/or evaluation , counseling and educating the patient/family/caregiver, ordering medications, tests, or procedures and documenting information in the medical record        Follow Up     Return if symptoms worsen or fail to improve.  Patient was given instructions and counseling regarding her condition or for health maintenance advice. Please see specific information pulled into the AVS if appropriate.        Part of this note may be an electronic transcription/translation of spoken language to printed text using the Dragon Dictation System

## 2022-03-08 ENCOUNTER — APPOINTMENT (OUTPATIENT)
Dept: LAB | Facility: HOSPITAL | Age: 52
End: 2022-03-08

## 2022-03-14 DIAGNOSIS — I10 ESSENTIAL HYPERTENSION: ICD-10-CM

## 2022-03-14 RX ORDER — LOSARTAN POTASSIUM 100 MG/1
100 TABLET ORAL DAILY
Qty: 90 TABLET | Refills: 1 | Status: SHIPPED | OUTPATIENT
Start: 2022-03-14

## 2022-04-21 ENCOUNTER — TRANSCRIBE ORDERS (OUTPATIENT)
Dept: ADMINISTRATIVE | Facility: HOSPITAL | Age: 52
End: 2022-04-21

## 2022-04-21 ENCOUNTER — HOSPITAL ENCOUNTER (OUTPATIENT)
Dept: GENERAL RADIOLOGY | Facility: HOSPITAL | Age: 52
Discharge: HOME | End: 2022-04-21
Admitting: NURSE PRACTITIONER

## 2022-04-21 DIAGNOSIS — R05.9 COUGH: Primary | ICD-10-CM

## 2022-04-21 DIAGNOSIS — R05.9 COUGH: ICD-10-CM

## 2022-04-21 PROCEDURE — 71046 X-RAY EXAM CHEST 2 VIEWS: CPT

## 2023-02-16 ENCOUNTER — TELEPHONE (OUTPATIENT)
Dept: FAMILY MEDICINE CLINIC | Facility: CLINIC | Age: 53
End: 2023-02-16
Payer: COMMERCIAL

## 2023-02-16 NOTE — TELEPHONE ENCOUNTER
Pt has recently moved to [facility] asking pt if she plans on continuing care with our office or if she would like her records transferred to another office. Asked pt to schedule annual exam if she would like to continue seeing moe

## 2023-02-20 RX ORDER — LAMOTRIGINE 200 MG/1
TABLET ORAL
Qty: 90 TABLET | Refills: 0 | OUTPATIENT
Start: 2023-02-20

## 2023-03-07 ENCOUNTER — TELEPHONE (OUTPATIENT)
Dept: FAMILY MEDICINE CLINIC | Facility: CLINIC | Age: 53
End: 2023-03-07
Payer: COMMERCIAL

## 2023-03-07 NOTE — TELEPHONE ENCOUNTER
Left vm asking pt to return call so we can update chart if she has new provider and get info for eye dr so we can get copy of last exam

## 2023-05-22 ENCOUNTER — HOSPITAL ENCOUNTER (EMERGENCY)
Facility: HOSPITAL | Age: 53
Discharge: HOME | End: 2023-05-22
Attending: EMERGENCY MEDICINE | Admitting: EMERGENCY MEDICINE
Payer: COMMERCIAL

## 2023-05-22 VITALS
OXYGEN SATURATION: 97 % | DIASTOLIC BLOOD PRESSURE: 82 MMHG | HEART RATE: 87 BPM | SYSTOLIC BLOOD PRESSURE: 125 MMHG | HEIGHT: 67 IN | BODY MASS INDEX: 37.92 KG/M2 | WEIGHT: 241.6 LBS | RESPIRATION RATE: 17 BRPM | TEMPERATURE: 97.9 F

## 2023-05-22 DIAGNOSIS — L02.91 ABSCESS: Primary | ICD-10-CM

## 2023-05-22 PROCEDURE — 99282 EMERGENCY DEPT VISIT SF MDM: CPT

## 2023-05-22 RX ORDER — DOXYCYCLINE 100 MG/1
100 CAPSULE ORAL 2 TIMES DAILY
Qty: 20 CAPSULE | Refills: 0 | Status: SHIPPED | OUTPATIENT
Start: 2023-05-22

## 2023-05-22 RX ORDER — LIDOCAINE HYDROCHLORIDE AND EPINEPHRINE 10; 10 MG/ML; UG/ML
10 INJECTION, SOLUTION INFILTRATION; PERINEURAL ONCE
Status: COMPLETED | OUTPATIENT
Start: 2023-05-22 | End: 2023-05-22

## 2023-05-22 RX ADMIN — LIDOCAINE HYDROCHLORIDE,EPINEPHRINE BITARTRATE 10 ML: 10; .01 INJECTION, SOLUTION INFILTRATION; PERINEURAL at 13:15

## 2023-05-22 NOTE — FSED PROVIDER NOTE
Subjective   History of Present Illness  Patient presents complaining of abscess to her left groin since Wednesday.  States she did have a fever at home but took some leftover doxycycline which seemed to help.  States it started draining.  And then filled back up and is now draining again.  Reports surrounding redness.        Review of Systems   Skin: Positive for wound.   All other systems reviewed and are negative.      Past Medical History:   Diagnosis Date   • Allergies    • Depression    • Diabetes mellitus    • GERD (gastroesophageal reflux disease)    • Hyperlipidemia    • Hypertension    • Pneumonia        Allergies   Allergen Reactions   • Cephalexin Anaphylaxis   • Erythromycin Anaphylaxis   • Lisinopril Angioedema   • Penicillin G Anaphylaxis       Past Surgical History:   Procedure Laterality Date   • COLONOSCOPY         Family History   Problem Relation Age of Onset   • Heart failure Mother    • Cancer Father    • Heart failure Father    • Ovarian cancer Maternal Grandmother        Social History     Socioeconomic History   • Marital status:    Tobacco Use   • Smoking status: Never   • Smokeless tobacco: Never   Substance and Sexual Activity   • Alcohol use: No   • Drug use: No           Objective   Physical Exam  Vitals and nursing note reviewed.   Constitutional:       Appearance: Normal appearance.   HENT:      Head: Normocephalic and atraumatic.   Pulmonary:      Effort: Pulmonary effort is normal.   Musculoskeletal:      Cervical back: Normal range of motion and neck supple.   Skin:     General: Skin is warm and dry.      Capillary Refill: Capillary refill takes less than 2 seconds.      Comments: Abscess to left groin with surrounding redness.  Area of fluctuance noted.  Currently draining purulent discharge   Neurological:      General: No focal deficit present.      Mental Status: She is alert and oriented to person, place, and time.         Incision & Drainage    Date/Time: 5/22/2023  1:03 PM  Performed by: Meagan Araiza APRN  Authorized by: Leo Kennedy MD     Consent:     Consent obtained:  Verbal    Consent given by:  Patient    Risks, benefits, and alternatives were discussed: yes      Risks discussed:  Bleeding, incomplete drainage, infection and pain    Alternatives discussed:  Alternative treatment  Universal protocol:     Procedure explained and questions answered to patient or proxy's satisfaction: yes      Patient identity confirmed:  Verbally with patient  Location:     Type:  Abscess    Location:  Trunk    Trunk location: Left groin.  Pre-procedure details:     Skin preparation:  Chlorhexidine  Sedation:     Sedation type:  None  Anesthesia:     Anesthesia method:  Local infiltration    Local anesthetic:  Lidocaine 1% WITH epi  Procedure type:     Complexity:  Simple  Procedure details:     Incision types:  Single straight    Incision depth:  Dermal    Drainage:  Purulent    Drainage amount:  Moderate    Wound treatment:  Wound left open    Packing materials:  1/4 in iodoform gauze  Post-procedure details:     Procedure completion:  Tolerated well, no immediate complications               ED Course                                           Medical Decision Making  Patient instructed to remove packing in 48 hours.  She is nontoxic-appearing.  Will Rx doxycycline as she cannot tolerate Bactrim.  She is to follow-up with her PCP as needed.  Given strict return precautions.    Abscess: acute illness or injury  Risk  Prescription drug management.          Final diagnoses:   Abscess       ED Disposition  ED Disposition     ED Disposition   Discharge    Condition   Stable    Comment   --             Loren Steel APRN  [address] [address]  [phone]    Call   As needed         Medication List      New Prescriptions    doxycycline 100 MG capsule  Commonly known as: MONODOX  Take 1 capsule by mouth 2 (Two) Times a Day.           Where to Get Your Medications       These medications were sent to [id number] - [place], [place] - [address] - [phone] PH - [phone] FX  [address] [place] [address]    Phone: [phone]   · doxycycline 100 MG capsule

## 2023-06-29 ENCOUNTER — TELEPHONE (OUTPATIENT)
Dept: FAMILY MEDICINE CLINIC | Facility: CLINIC | Age: 53
End: 2023-06-29

## 2023-06-29 NOTE — TELEPHONE ENCOUNTER
Spoke with a nurse from [facility] ED earlier today in r/t patient. Patient was in ED this morning for hyperglycemia; she has been having issues for a bit with her BS not coming down. They were checking to see if a sooner appointment was available. I advised that schedule with provider was booked but if patient could get in for sooner appointment, I would contact patient. Understanding was indicated. Patient is scheduled for 8/17/23 for office visit and added to the wait list.     Patient called the office approx 12:35 this afternoon. I asked her if she was still seeing Jeanine Stratton, a family medicine provider noted in patient's chart as have been being seen. Patient states she was seeing her, as she moved to [place]. She has since moved back to [place] and would like to start seeing Loren again. Patient would like a sooner appointment with Loren for medication adjustments for her diabetes and continuing high BS levels. Advised patient her 8/17/23 appointment has been added to the wait list, as provider schedule is booked until then.     Please advise.  Also, would patient require being scheduled as new patient since she was seeing a different provider?

## 2023-08-07 ENCOUNTER — TELEPHONE (OUTPATIENT)
Dept: FAMILY MEDICINE CLINIC | Facility: CLINIC | Age: 53
End: 2023-08-07
Payer: COMMERCIAL

## 2023-08-07 NOTE — TELEPHONE ENCOUNTER
LVM regarding Appt on 08/17/2023. Need to reschedule appt time for 08/17/2023    Okay for Hub to Read. Please warm transfer to get rescheduled.

## 2023-08-24 ENCOUNTER — TELEPHONE (OUTPATIENT)
Dept: CARDIOLOGY | Facility: CLINIC | Age: 53
End: 2023-08-24
Payer: COMMERCIAL

## 2023-08-24 NOTE — TELEPHONE ENCOUNTER
CALLED PATIENT AND LEFT MESSAGE TO SCHEDULE NEW PATIENT APT WITH DR. JENSEN.    REFERRED BY ADRIANA LUIS  DX:[id number], HEART PALPITATIONS.

## 2023-09-25 ENCOUNTER — OFFICE VISIT (OUTPATIENT)
Dept: CARDIOLOGY | Facility: CLINIC | Age: 53
End: 2023-09-25

## 2023-09-25 ENCOUNTER — TELEPHONE (OUTPATIENT)
Dept: CARDIOLOGY | Facility: CLINIC | Age: 53
End: 2023-09-25

## 2023-09-25 VITALS
HEART RATE: 64 BPM | BODY MASS INDEX: 36 KG/M2 | WEIGHT: 224 LBS | SYSTOLIC BLOOD PRESSURE: 113 MMHG | DIASTOLIC BLOOD PRESSURE: 71 MMHG | OXYGEN SATURATION: 97 % | HEIGHT: 66 IN

## 2023-09-25 DIAGNOSIS — Z79.4 CONTROLLED TYPE 2 DIABETES MELLITUS WITHOUT COMPLICATION, WITH LONG-TERM CURRENT USE OF INSULIN: ICD-10-CM

## 2023-09-25 DIAGNOSIS — E78.5 DYSLIPIDEMIA: Primary | ICD-10-CM

## 2023-09-25 DIAGNOSIS — I10 ESSENTIAL HYPERTENSION: ICD-10-CM

## 2023-09-25 DIAGNOSIS — E11.9 CONTROLLED TYPE 2 DIABETES MELLITUS WITHOUT COMPLICATION, WITH LONG-TERM CURRENT USE OF INSULIN: ICD-10-CM

## 2023-09-25 DIAGNOSIS — R07.89 CHEST DISCOMFORT: Primary | ICD-10-CM

## 2023-09-25 DIAGNOSIS — R07.89 CHEST DISCOMFORT: ICD-10-CM

## 2023-09-25 DIAGNOSIS — R00.2 PALPITATIONS: ICD-10-CM

## 2023-09-25 PROCEDURE — 99204 OFFICE O/P NEW MOD 45 MIN: CPT | Performed by: INTERNAL MEDICINE

## 2023-09-25 RX ORDER — BIOTIN 10 MG
TABLET ORAL
COMMUNITY

## 2023-09-25 RX ORDER — SEMAGLUTIDE 1.34 MG/ML
0.25 INJECTION, SOLUTION SUBCUTANEOUS
COMMUNITY
Start: 2023-07-15

## 2023-09-25 NOTE — PROGRESS NOTES
Encounter Date:09/25/2023      Patient ID: Rosita Siegel is a 53 y.o. female.    Chief Complaint:  Chest discomfort  Palpitations  Hypertension  Dyslipidemia  Diabetes    History of present illness  Patient lately has been having off-and-on nonexertional chest discomfort left precordial tightness and heaviness sensation associated with some shortness of breath and palpitations.  Patient recently increase her metoprolol to 75 mg twice daily.  Patient has multiple coronary risk factors.    Patient is not having any dizziness or syncope.  Denies having any headache ,abdominal pain ,nausea, vomiting , diarrhea constipation, loss of weight or loss of appetite.  Denies having any excessive bruising ,hematuria or blood in the stool.    Review of all systems negative except as indicated.    Reviewed ROS.    Assessment and Plan     [[[[[[[[[[[[[[[[[[[[[[[[[[[  Impression  =============  -Chest discomfort  Palpitations  Shortness of breath    - Hypertension diabetes (on insulin) dyslipidemia CKD 3    - Negative surgical problems.    - Family history of heart disease.    - Non-smoker    - Allergic to cephalexin penicillin lisinopril azithromycin  ==============  Plan  =============  Chest discomfort shortness of breath and palpitations.    Patient has multiple coronary risk factors.    Stress Cardiolite test  Echocardiogram.    Patient recently has been taking metoprolol 75 mg twice daily.    Hypertension-well-controlled 113/70.    Dyslipidemia-on fenofibrate    Diabetes-on insulin    Medications were reviewed and updated.    Follow-up in the office on the same day.    Further plan will depend on patient's progress.  ]]]]]]]]]]]]]]]]]]]]]]]]]           Diagnosis Plan   1. Dyslipidemia        2. Essential hypertension        3. Chest discomfort        4. Controlled type 2 diabetes mellitus without complication, with long-term current use of insulin        5. Palpitations        LAB RESULTS (LAST 7 DAYS)    CBC        BMP         CMP         BNP        TROPONIN        CoAg        Creatinine Clearance  CrCl cannot be calculated (Patient's most recent lab result is older than the maximum 30 days allowed.).    ABG        Radiology  No radiology results for the last day                The following portions of the patient's history were reviewed and updated as appropriate: allergies, current medications, past family history, past medical history, past social history, past surgical history, and problem list.    Review of Systems   Constitutional: Negative for malaise/fatigue.   Cardiovascular:  Positive for chest pain and palpitations. Negative for dyspnea on exertion and leg swelling.   Respiratory:  Positive for shortness of breath. Negative for cough.    Gastrointestinal:  Negative for abdominal pain, nausea and vomiting.   Neurological:  Positive for dizziness and light-headedness. Negative for focal weakness, headaches and numbness.   All other systems reviewed and are negative.      Current Outpatient Medications:     albuterol sulfate  (90 Base) MCG/ACT inhaler, Inhale 2 puffs Every 4 (Four) Hours As Needed for Wheezing or Shortness of Air., Disp: 1 inhaler, Rfl: 0    Biotin 10 MG tablet, Take  by mouth., Disp: , Rfl:     Blood Glucose Monitoring Suppl (OneTouch Verio Flex System) w/Device kit, 1 each Take As Directed. To check blood glucose BID, Disp: 1 kit, Rfl: 0    Cetirizine HCl 10 MG capsule, ZYRTEC ALLERGY 10 MG CAPS, Disp: , Rfl:     desvenlafaxine (PRISTIQ) 100 MG 24 hr tablet, Take 1 tablet by mouth Daily., Disp: , Rfl:     esomeprazole (nexIUM) 40 MG capsule, Take 1 capsule by mouth Every Morning Before Breakfast., Disp: 90 capsule, Rfl: 1    fenofibrate (TRICOR) 145 MG tablet, TAKE ONE TABLET BY MOUTH DAILY, Disp: 90 tablet, Rfl: 1    glucose blood (OneTouch Verio) test strip, Use onetouch verio flex strips as instructed to check to check blood glucose BID, Disp: 100 each, Rfl: 12    hydrALAZINE (APRESOLINE) 25 MG  tablet, TAKE ONE TABLET BY MOUTH TWICE A DAY, Disp: 180 tablet, Rfl: 1    hydroCHLOROthiazide (HYDRODIURIL) 25 MG tablet, TAKE ONE TABLET BY MOUTH DAILY, Disp: 90 tablet, Rfl: 1    hydrOXYzine (ATARAX) 25 MG tablet, Take 1 tablet by mouth 2 (Two) Times a Day As Needed for Anxiety., Disp: 180 tablet, Rfl: 0    Hyoscyamine Sulfate SL (Levsin/SL) 0.125 MG sublingual tablet, Place 0.125 mg under the tongue Every 4 (Four) Hours As Needed (diarrhea)., Disp: 90 each, Rfl: 0    insulin degludec (TRESIBA FLEXTOUCH) 100 UNIT/ML solution pen-injector injection, Inject 30 Units under the skin into the appropriate area as directed Daily., Disp: 3 pen, Rfl: 5    Insulin Lispro, 1 Unit Dial, (HumaLOG KwikPen) 100 UNIT/ML solution pen-injector, Three times daily with meals, Disp: 12 mL, Rfl: 0    lamoTRIgine (LaMICtal) 100 MG tablet, Take 1 tablet by mouth Daily., Disp: 90 tablet, Rfl: 0    losartan (COZAAR) 100 MG tablet, Take 1 tablet by mouth Daily., Disp: 90 tablet, Rfl: 1    metFORMIN ER (GLUCOPHAGE-XR) 500 MG 24 hr tablet, TAKE ONE TABLET BY MOUTH TWICE A DAY, Disp: 180 tablet, Rfl: 1    metoprolol tartrate (LOPRESSOR) 50 MG tablet, Take 1 tablet by mouth 2 (Two) Times a Day., Disp: 180 tablet, Rfl: 1    metroNIDAZOLE (Metrogel) 1 % gel, Apply  topically to the appropriate area as directed Daily., Disp: 60 g, Rfl: 0    Multiple Vitamins-Minerals (b complex-C-E-zinc) tablet, Take 1 tablet by mouth Daily., Disp: , Rfl:     Semaglutide,0.25 or 0.5MG/DOS, (Ozempic, 0.25 or 0.5 MG/DOSE,) 2 MG/1.5ML solution pen-injector, Inject 0.25 mg under the skin into the appropriate area as directed Every 7 (Seven) Days. (Patient not taking: Reported on 9/25/2023), Disp: , Rfl:     Allergies   Allergen Reactions    Cephalexin Anaphylaxis    Erythromycin Anaphylaxis    Lisinopril Angioedema    Penicillin G Anaphylaxis       Family History   Problem Relation Age of Onset    Heart failure Mother     Arrhythmia Mother     Cancer Father     Heart  "failure Father     Anemia Father     Clotting disorder Father     Ovarian cancer Maternal Grandmother     Heart attack Maternal Grandfather     Heart disease Maternal Grandfather        Past Surgical History:   Procedure Laterality Date    COLONOSCOPY         Past Medical History:   Diagnosis Date    Abnormal ECG     Allergies     Arrhythmia     Chronic kidney disease 01/01/2018    Depression     Diabetes mellitus     GERD (gastroesophageal reflux disease)     Hyperlipidemia     Hypertension     Pneumonia        Family History   Problem Relation Age of Onset    Heart failure Mother     Arrhythmia Mother     Cancer Father     Heart failure Father     Anemia Father     Clotting disorder Father     Ovarian cancer Maternal Grandmother     Heart attack Maternal Grandfather     Heart disease Maternal Grandfather        Social History     Socioeconomic History    Marital status:    Tobacco Use    Smoking status: Never     Passive exposure: Never    Smokeless tobacco: Never   Vaping Use    Vaping Use: Never used   Substance and Sexual Activity    Alcohol use: No    Drug use: No    Sexual activity: Not Currently     Partners: Male     Birth control/protection: Condom, Pill, Diaphragm         Procedures      Objective:       Physical Exam    /71 (BP Location: Right arm, Patient Position: Sitting, Cuff Size: Adult)   Pulse 64   Ht 167.6 cm (66\")   Wt 102 kg (224 lb)   SpO2 97%   BMI 36.15 kg/m²   The patient is alert, oriented and in no distress.    Vital signs as noted above.    Head and neck revealed no carotid bruits or jugular venous distension.  No thyromegaly or lymphadenopathy is present.    Lungs clear.  No wheezing.  Breath sounds are normal bilaterally.    Heart normal first and second heart sounds.  No murmur..  No pericardial rub is present.  No gallop is present.    Abdomen soft and nontender.  No organomegaly is present.    Extremities revealed good peripheral pulses without any pedal " edema.    Skin warm and dry.    Musculoskeletal system is grossly normal.    CNS grossly normal.

## 2023-09-27 ENCOUNTER — PATIENT ROUNDING (BHMG ONLY) (OUTPATIENT)
Dept: CARDIOLOGY | Facility: CLINIC | Age: 53
End: 2023-09-27
Payer: COMMERCIAL

## 2023-09-27 NOTE — PROGRESS NOTES
A My-Chart message has been sent to the patient for PATIENT ROUNDING with [facility]

## 2023-10-31 ENCOUNTER — HOSPITAL ENCOUNTER (OUTPATIENT)
Dept: CARDIOLOGY | Facility: HOSPITAL | Age: 53
Discharge: HOME | End: 2023-10-31
Payer: COMMERCIAL

## 2023-12-27 ENCOUNTER — TELEPHONE (OUTPATIENT)
Dept: CARDIOLOGY | Facility: CLINIC | Age: 53
End: 2023-12-27
Payer: COMMERCIAL

## 2023-12-27 DIAGNOSIS — I10 ESSENTIAL HYPERTENSION: ICD-10-CM

## 2023-12-27 RX ORDER — METOPROLOL TARTRATE 75 MG/1
75 TABLET, FILM COATED ORAL 2 TIMES DAILY
Qty: 180 TABLET | Refills: 3 | Status: SHIPPED | OUTPATIENT
Start: 2023-12-27

## 2023-12-27 NOTE — TELEPHONE ENCOUNTER
Rx Refill Note  Requested Prescriptions     Pending Prescriptions Disp Refills    metoprolol tartrate 75 MG tablet 180 tablet 3     Sig: Take 75 mg by mouth 2 (Two) Times a Day.      Last office visit with prescribing clinician: 9/25/2023   Last telemedicine visit with prescribing clinician: Visit date not found   Next office visit with prescribing clinician: Visit date not found                         Would you like a call back once the refill request has been completed: [] Yes [] No    If the office needs to give you a call back, can they leave a voicemail: [] Yes [] No    Jacki Trinidad MA  12/27/23, 10:28 EST

## 2023-12-27 NOTE — TELEPHONE ENCOUNTER
Dr. Cleveland increased metoprolol back in September. Her PCP is going to refill the medication but needs something send over on how patient is supposed to take it. Patient can not find anything on mychart about the increase.

## 2023-12-27 NOTE — TELEPHONE ENCOUNTER
Spoke to patient and advised per Dr Cleveland's last office visit note he had increased her Metoprolol from 50 mg twice daily to 75 mg twice daily. Patient needed a new prescription sent in and I went ahead and sent in a prescription to requested pharmacy. Patient understood.

## 2023-12-27 NOTE — TELEPHONE ENCOUNTER
Caller: Rosita Siegel    Relationship: Self    Best call back number: [phone]     What is the best time to reach you: ANYTIME    Who are you requesting to speak with (clinical staff, provider,  specific staff member): DEMETRIS    Do you know the name of the person who called: MA    What was the call regarding: MEDICATION DOCUMENTATIONS    Is it okay if the provider responds through MyChart: CALL BACK PLEASE

## 2024-01-02 ENCOUNTER — TELEPHONE (OUTPATIENT)
Dept: ENDOCRINOLOGY | Facility: CLINIC | Age: 54
End: 2024-01-02
Payer: COMMERCIAL

## 2024-01-02 NOTE — TELEPHONE ENCOUNTER
PT NO SHOWED APPT THIS MORNING - DR HAYS ASKED ME TO CALL AND SEE IF PT WANTED TO RESCHEDULE FOR TOMORROW MORNING OR ANY OTHER OPENINGS HE HAS THIS WEEK OR NEXT

## 2024-03-01 NOTE — TELEPHONE ENCOUNTER
REFLEXIVE URINE CULTURE No Growth        Per provider: Please notify patient that she can stop macrobid. F/U with PCP if symptoms continue.     Second attempt to reach patient.       Telephone Information:   Mobile [phone]     Okay to leave a message containing results? Yes     Rocio Krishnap:  [phone]    Updated patient on results and recommendations. Pt reports improved symptoms.    Scheduled in January

## 2024-10-07 NOTE — PROGRESS NOTES
Hematology/Oncology Outpatient Follow Up    PATIENT NAME:Rosita Siegel  :1970  MRN: [id number]  PRIMARY CARE PHYSICIAN: Jeanine Stratton APRN  REFERRING PHYSICIAN: No ref. provider found    Chief Complaint   Patient presents with    Follow-up     Iron deficiency anemia, unspecified iron deficiency anemia type        HISTORY OF PRESENT ILLNESS:     This is a 50-year-old female who has been known to have anemia for couple years. Patient had a colonoscopy first irritable bowel syndrome more than 10 years ago. She has intermittent and explosive diarrhea. She denies GI bleed, or blood in her urine. She had Covid back in 2021 and received IVIG. She has been a longtime diabetic with diabetic complications including peripheral neuropathy which has progressively gotten worse over the past 1 year. Patient has been on baby aspirin since May 2021. Due to the anemia she was placed on oral iron tablets in May 2021.  She also has a history of menorrhagia and has been recommended to have uterine ablation. Patient currently sees Dr. Jovanna Fernandez for this evaluation.     She had CBC on 1921 white count was 10.8, hemoglobin is 10.3, MCV was low at 72, RDW was high at 15.8 and platelets where 509.  She also had a chemistry panel BUN is 19, creatinine is 1.2, ALT was high at 39 AST was 65 and alkaline phosphatase was 118.  Her hemoglobin A1c was high at 8.4.  She also had hypercholesterolemia.  She has normal thyroid function.  Today her white count is 15.3, hemoglobin is up to 11.1 she has microcytosis at 72, RDW is still high at 16.6 and platelets are down to 279.  On her differential she has mild lymphocytosis.  Patient had viral hepatitis C antibody which was negative.     Patient has referred secondary to anemia. She is accompanied today by her mother for this appointment    2021: Patient had anemia work-up B12 which was normal at 5 8, white count was 9.6 hemoglobin 9.7 MCV was low at 71 and  platelets were 496.  BUN is 15, creatinine 1.2.  Ferritin was low at 17, TIBC was high at 554, folate was low normal at 5.7, haptoglobin is elevated to 428, reticulocyte count is normal at 1.53, SPEP with SUNIL did not reveal any monoclonal protein.  6/18/2021 patient was scheduled to receive IV Injectafer 750 mg D1 98 due to poor oral iron tolerance.  She was also placed on folic acid 1 mg p.o. daily for total of 3 months.  Past Medical History:   Diagnosis Date    Abnormal ECG     Allergies     Arrhythmia     Chronic kidney disease 01/01/2018    Depression     Diabetes mellitus     Diverticulosis     GERD (gastroesophageal reflux disease)     Hyperlipidemia     Hypertension     IBS (irritable bowel syndrome)     PCOS (polycystic ovarian syndrome)     Pneumonia        Past Surgical History:   Procedure Laterality Date    COLONOSCOPY           Current Outpatient Medications:     albuterol sulfate  (90 Base) MCG/ACT inhaler, Inhale 2 puffs Every 4 (Four) Hours As Needed for Wheezing or Shortness of Air., Disp: 1 inhaler, Rfl: 0    Blood Glucose Monitoring Suppl (OneTouch Verio Flex System) w/Device kit, 1 each Take As Directed. To check blood glucose BID, Disp: 1 kit, Rfl: 0    Cetirizine HCl 10 MG capsule, ZYRTEC ALLERGY 10 MG CAPS, Disp: , Rfl:     desvenlafaxine (PRISTIQ) 100 MG 24 hr tablet, Take 1 tablet by mouth Daily., Disp: , Rfl:     esomeprazole (nexIUM) 40 MG capsule, Take 1 capsule by mouth Every Morning Before Breakfast., Disp: 90 capsule, Rfl: 1    fluconazole (DIFLUCAN) 200 MG tablet, Take 1 tablet by mouth As Needed., Disp: , Rfl:     gabapentin (NEURONTIN) 100 MG capsule, Take 1 capsule by mouth Every 12 (Twelve) Hours., Disp: , Rfl:     glucose blood (OneTouch Verio) test strip, Use onetouch verio flex strips as instructed to check to check blood glucose BID, Disp: 100 each, Rfl: 12    hydrALAZINE (APRESOLINE) 25 MG tablet, TAKE ONE TABLET BY MOUTH TWICE A DAY, Disp: 180 tablet, Rfl: 1     hydroCHLOROthiazide (HYDRODIURIL) 25 MG tablet, TAKE ONE TABLET BY MOUTH DAILY, Disp: 90 tablet, Rfl: 1    hydrOXYzine (ATARAX) 25 MG tablet, Take 1 tablet by mouth 2 (Two) Times a Day As Needed for Anxiety., Disp: 180 tablet, Rfl: 0    Hyoscyamine Sulfate SL (Levsin/SL) 0.125 MG sublingual tablet, Place 0.125 mg under the tongue Every 4 (Four) Hours As Needed (diarrhea)., Disp: 90 each, Rfl: 0    insulin degludec (TRESIBA FLEXTOUCH) 100 UNIT/ML solution pen-injector injection, Inject 30 Units under the skin into the appropriate area as directed Daily., Disp: 3 pen, Rfl: 5    Insulin Lispro, 1 Unit Dial, (HumaLOG KwikPen) 100 UNIT/ML solution pen-injector, Three times daily with meals, Disp: 12 mL, Rfl: 0    lamoTRIgine (LaMICtal) 100 MG tablet, Take 1 tablet by mouth Daily., Disp: 90 tablet, Rfl: 0    losartan (COZAAR) 100 MG tablet, Take 1 tablet by mouth Daily., Disp: 90 tablet, Rfl: 1    metFORMIN ER (GLUCOPHAGE-XR) 500 MG 24 hr tablet, TAKE ONE TABLET BY MOUTH TWICE A DAY, Disp: 180 tablet, Rfl: 1    metoprolol tartrate 75 MG tablet, Take 75 mg by mouth 2 (Two) Times a Day., Disp: 180 tablet, Rfl: 3    Multiple Vitamins-Minerals (b complex-C-E-zinc) tablet, Take 1 tablet by mouth Daily., Disp: , Rfl:     POTASSIUM ACETATE IV, Take  by mouth., Disp: , Rfl:     Trulicity 0.75 MG/0.5ML solution pen-injector, Inject 0.75 mg under the skin into the appropriate area as directed. (Patient not taking: Reported on 10/11/2024), Disp: , Rfl:     Allergies   Allergen Reactions    Cephalexin Anaphylaxis    Erythromycin Anaphylaxis    Lisinopril Angioedema    Penicillin G Anaphylaxis       Family History   Problem Relation Age of Onset    Heart failure Mother     Arrhythmia Mother     Cancer Father     Heart failure Father     Anemia Father     Clotting disorder Father     Ovarian cancer Maternal Grandmother     Heart attack Maternal Grandfather     Heart disease Maternal Grandfather        Cancer-related family history  "includes Cancer in her father; Ovarian cancer in her maternal grandmother.    Social History     Tobacco Use    Smoking status: Never     Passive exposure: Never    Smokeless tobacco: Never   Vaping Use    Vaping status: Never Used   Substance Use Topics    Alcohol use: No    Drug use: No       I have reviewed and confirmed the accuracy of the patient's history: Chief complaint, HPI, ROS, and Subjective as entered by the MA/LPN/RN. Enma Ev Powers MD 10/11/24      SUBJECTIVE:    Patient is here today for follow-up.  She was found to be more anemic she complains of fatigue  She still has intermittent menstrual cycles and follows up with her gynecologist but she denies any rectal bleeding          REVIEW OF SYSTEMS:  Review of Systems   Constitutional:  Negative for chills and fever.   HENT:  Negative for ear pain, mouth sores, nosebleeds and sore throat.    Eyes:  Negative for photophobia and visual disturbance.   Respiratory:  Negative for wheezing and stridor.    Cardiovascular:  Negative for chest pain and palpitations.   Gastrointestinal:  Negative for abdominal pain, diarrhea, nausea and vomiting.   Endocrine: Negative for cold intolerance and heat intolerance.   Genitourinary:  Negative for dysuria and hematuria.   Musculoskeletal:  Negative for joint swelling and neck stiffness.   Skin:  Negative for color change and rash.   Neurological:  Negative for seizures and syncope.   Hematological:  Negative for adenopathy.        No obvious bleeding   Psychiatric/Behavioral:  Negative for agitation, confusion and hallucinations.        OBJECTIVE:    Vitals:    10/11/24 0857   BP: 106/69   Pulse: 73   Temp: 98.6 °F (37 °C)   SpO2: 97%   Weight: 101 kg (223 lb)   Height: 170.2 cm (67.01\")   PainSc: 0-No pain       Body mass index is 34.92 kg/m².    ECOG  (0) Fully active, able to carry on all predisease performance without restriction    Physical Exam  Vitals and nursing note reviewed.   Constitutional:       " General: She is not in acute distress.     Appearance: She is not diaphoretic.   HENT:      Head: Normocephalic and atraumatic.   Eyes:      General: No scleral icterus.        Right eye: No discharge.         Left eye: No discharge.      Conjunctiva/sclera: Conjunctivae normal.   Neck:      Thyroid: No thyromegaly.   Cardiovascular:      Rate and Rhythm: Normal rate and regular rhythm.      Heart sounds: Normal heart sounds.      No friction rub. No gallop.   Pulmonary:      Effort: Pulmonary effort is normal. No respiratory distress.      Breath sounds: No stridor. No wheezing.   Abdominal:      General: Bowel sounds are normal.      Palpations: Abdomen is soft. There is no mass.      Tenderness: There is no abdominal tenderness. There is no guarding or rebound.   Musculoskeletal:         General: No tenderness. Normal range of motion.      Cervical back: Normal range of motion and neck supple.   Lymphadenopathy:      Cervical: No cervical adenopathy.   Skin:     General: Skin is warm.      Findings: No erythema or rash.   Neurological:      Mental Status: She is alert and oriented to person, place, and time.      Motor: No abnormal muscle tone.   Psychiatric:         Behavior: Behavior normal.     I have reexamined the patient and the results are consistent with the previously documented exam. Enma Powers MD   10/11/24    RECENT LABS    WBC   Date Value Ref Range Status   10/11/2024 9.58 3.40 - 10.80 10*3/mm3 Final   06/15/2020 10.0 3.4 - 10.8 x10E3/uL Final     RBC   Date Value Ref Range Status   10/11/2024 4.90 3.77 - 5.28 10*6/mm3 Final   06/15/2020 4.95 3.77 - 5.28 x10E6/uL Final     Hemoglobin   Date Value Ref Range Status   10/11/2024 10.4 (L) 12.0 - 15.9 g/dL Final     Hematocrit   Date Value Ref Range Status   10/11/2024 36.1 34.0 - 46.6 % Final     MCV   Date Value Ref Range Status   10/11/2024 73.7 (L) 79.0 - 97.0 fL Final     MCH   Date Value Ref Range Status   10/11/2024 21.2 (L) 26.6 -  33.0 pg Final     MCHC   Date Value Ref Range Status   10/11/2024 28.8 (L) 31.5 - 35.7 g/dL Final     RDW   Date Value Ref Range Status   10/11/2024 16.5 (H) 12.3 - 15.4 % Final     RDW-SD   Date Value Ref Range Status   10/11/2024 42.9 37.0 - 54.0 fl Final     MPV   Date Value Ref Range Status   10/11/2024 9.1 6.0 - 12.0 fL Final     Platelets   Date Value Ref Range Status   10/11/2024 471 (H) 140 - 450 10*3/mm3 Final     Neutrophil %   Date Value Ref Range Status   10/11/2024 48.6 42.7 - 76.0 % Final     Lymphocyte %   Date Value Ref Range Status   10/11/2024 41.6 19.6 - 45.3 % Final     Monocyte %   Date Value Ref Range Status   10/11/2024 5.2 5.0 - 12.0 % Final     Eosinophil %   Date Value Ref Range Status   10/11/2024 3.7 0.3 - 6.2 % Final     Basophil %   Date Value Ref Range Status   10/11/2024 0.9 0.0 - 1.5 % Final     Immature Grans %   Date Value Ref Range Status   06/27/2023 0.8 (H) 0.0 - 0.5 % Final     Neutrophils, Absolute   Date Value Ref Range Status   10/11/2024 4.65 1.70 - 7.00 10*3/mm3 Final     Lymphocytes, Absolute   Date Value Ref Range Status   10/11/2024 3.99 (H) 0.70 - 3.10 10*3/mm3 Final     Monocytes, Absolute   Date Value Ref Range Status   10/11/2024 0.50 0.10 - 0.90 10*3/mm3 Final     Eosinophils, Absolute   Date Value Ref Range Status   10/11/2024 0.35 0.00 - 0.40 10*3/mm3 Final     Basophils, Absolute   Date Value Ref Range Status   10/11/2024 0.09 0.00 - 0.20 10*3/mm3 Final     Immature Grans, Absolute   Date Value Ref Range Status   06/27/2023 0.10 (H) 0.00 - 0.05 10*3/mm3 Final     nRBC   Date Value Ref Range Status   06/29/2023 0.0 0.0 - 0.2 /100 WBC Final       Lab Results   Component Value Date    GLUCOSE 285 (H) 06/29/2023    BUN 25 (H) 06/29/2023    CREATININE 1.58 (H) 06/29/2023    EGFRIFNONA 36 (L) 02/22/2022    EGFRIFAFRI 34 (L) 06/15/2020    BCR 15.8 06/29/2023    K 3.9 06/29/2023    CO2 22.0 06/29/2023    CALCIUM 9.7 06/29/2023    PROTENTOTREF 7.3 06/16/2021    ALBUMIN  4.6 06/27/2023    LABIL2 1.0 06/16/2021    AST 43 (H) 06/27/2023    ALT 36 (H) 06/27/2023       ASSESSMENT:       Recurrent microcytic anemia likely secondary to iron deficiency, anemia of chronic disease. Patient has several morbidities which can lead to anemia of chronic disease including diabetes. Was placed on oral iron supplementation in May 2021 and her hemoglobin has increased. She does have problems with GI tolerance of oral iron.  She received IV iron and has had a hemoglobin response  Iron deficiency anemia: Ongoing management  GI intolerance of oral iron supplements  Menorrhagia which likely is contributing to her iron deficiency.  She will follow-up with her gynecologist  Thrombocytosis most likely reactive.  Following IV iron we will see if resolves this could be secondary to iron deficiency  Diabetes type 2 poorly controlled  Peripheral neuropathy progressive most likely due to uncontrolled diabetes           Plans     GI referral for iron deficiency anemia patient has an appointment with Dr. Guallpa on 10/15/2021. EGD and cscope scheduled  December 5 UA with no blood  Follow-up with GYN for menorrhagia.  Patient has an appointment coming up with Dr. Fernandez  She has oral iron intolerance  Iron studies B12 folate today.  IV iron ordered  We will continue to follow-up with her PCP for management of her diabetes and other medical problems.  Follow-up 2 months or earlier as needed        I have reviewed labs results, imaging, vitals, and medications with the patient today.             Patient verbalized understanding and is in agreement of the above plan.              Thank you very much allowing me to participate in the care of Rosita, I will keep you updated on her progress         I spent 30 total minutes, face-to-face, caring for Rosita today. 90% of this time involved counseling and/or coordination of care as documented within this note.

## 2024-10-10 ENCOUNTER — TRANSCRIBE ORDERS (OUTPATIENT)
Dept: ADMINISTRATIVE | Facility: HOSPITAL | Age: 54
End: 2024-10-10
Payer: COMMERCIAL

## 2024-10-10 DIAGNOSIS — I12.9 HYPERTENSIVE KIDNEY DISEASE WITH CHRONIC KIDNEY DISEASE STAGE II: Primary | ICD-10-CM

## 2024-10-10 DIAGNOSIS — N18.2 HYPERTENSIVE KIDNEY DISEASE WITH CHRONIC KIDNEY DISEASE STAGE II: Primary | ICD-10-CM

## 2024-10-10 DIAGNOSIS — N18.32 CHRONIC KIDNEY DISEASE (CKD) STAGE G3B/A3, MODERATELY DECREASED GLOMERULAR FILTRATION RATE (GFR) BETWEEN 30-44 ML/MIN/1.73 SQUARE METER AND ALBUMINURIA CREATININE RATIO GREATER THAN 300 MG/G (C*: ICD-10-CM

## 2024-10-11 ENCOUNTER — LAB (OUTPATIENT)
Dept: LAB | Facility: HOSPITAL | Age: 54
End: 2024-10-11
Payer: COMMERCIAL

## 2024-10-11 ENCOUNTER — OFFICE VISIT (OUTPATIENT)
Dept: ONCOLOGY | Facility: CLINIC | Age: 54
End: 2024-10-11
Payer: COMMERCIAL

## 2024-10-11 VITALS
WEIGHT: 223 LBS | BODY MASS INDEX: 35 KG/M2 | TEMPERATURE: 98.6 F | DIASTOLIC BLOOD PRESSURE: 69 MMHG | OXYGEN SATURATION: 97 % | SYSTOLIC BLOOD PRESSURE: 106 MMHG | HEART RATE: 73 BPM | HEIGHT: 67 IN

## 2024-10-11 DIAGNOSIS — D50.9 IRON DEFICIENCY ANEMIA, UNSPECIFIED IRON DEFICIENCY ANEMIA TYPE: Primary | ICD-10-CM

## 2024-10-11 LAB
BASOPHILS # BLD AUTO: 0.09 10*3/MM3 (ref 0–0.2)
BASOPHILS NFR BLD AUTO: 0.9 % (ref 0–1.5)
DEPRECATED RDW RBC AUTO: 42.9 FL (ref 37–54)
EOSINOPHIL # BLD AUTO: 0.35 10*3/MM3 (ref 0–0.4)
EOSINOPHIL NFR BLD AUTO: 3.7 % (ref 0.3–6.2)
ERYTHROCYTE [DISTWIDTH] IN BLOOD BY AUTOMATED COUNT: 16.5 % (ref 12.3–15.4)
FERRITIN SERPL-MCNC: 18.4 NG/ML (ref 13–150)
FOLATE SERPL-MCNC: 12.6 NG/ML (ref 4.78–24.2)
HCT VFR BLD AUTO: 36.1 % (ref 34–46.6)
HGB BLD-MCNC: 10.4 G/DL (ref 12–15.9)
HOLD SPECIMEN: NORMAL
HOLD SPECIMEN: NORMAL
IRON 24H UR-MRATE: 20 MCG/DL (ref 37–145)
IRON SATN MFR SERPL: 4 % (ref 20–50)
LYMPHOCYTES # BLD AUTO: 3.99 10*3/MM3 (ref 0.7–3.1)
LYMPHOCYTES NFR BLD AUTO: 41.6 % (ref 19.6–45.3)
MCH RBC QN AUTO: 21.2 PG (ref 26.6–33)
MCHC RBC AUTO-ENTMCNC: 28.8 G/DL (ref 31.5–35.7)
MCV RBC AUTO: 73.7 FL (ref 79–97)
MONOCYTES # BLD AUTO: 0.5 10*3/MM3 (ref 0.1–0.9)
MONOCYTES NFR BLD AUTO: 5.2 % (ref 5–12)
NEUTROPHILS NFR BLD AUTO: 4.65 10*3/MM3 (ref 1.7–7)
NEUTROPHILS NFR BLD AUTO: 48.6 % (ref 42.7–76)
PLATELET # BLD AUTO: 471 10*3/MM3 (ref 140–450)
PMV BLD AUTO: 9.1 FL (ref 6–12)
RBC # BLD AUTO: 4.9 10*6/MM3 (ref 3.77–5.28)
TIBC SERPL-MCNC: 502 MCG/DL (ref 298–536)
TRANSFERRIN SERPL-MCNC: 337 MG/DL (ref 200–360)
VIT B12 BLD-MCNC: 835 PG/ML (ref 211–946)
WBC NRBC COR # BLD AUTO: 9.58 10*3/MM3 (ref 3.4–10.8)

## 2024-10-11 PROCEDURE — 85025 COMPLETE CBC W/AUTO DIFF WBC: CPT

## 2024-10-11 PROCEDURE — 82728 ASSAY OF FERRITIN: CPT | Performed by: INTERNAL MEDICINE

## 2024-10-11 PROCEDURE — 82607 VITAMIN B-12: CPT | Performed by: INTERNAL MEDICINE

## 2024-10-11 PROCEDURE — 36415 COLL VENOUS BLD VENIPUNCTURE: CPT

## 2024-10-11 PROCEDURE — 83540 ASSAY OF IRON: CPT | Performed by: INTERNAL MEDICINE

## 2024-10-11 PROCEDURE — 82746 ASSAY OF FOLIC ACID SERUM: CPT | Performed by: INTERNAL MEDICINE

## 2024-10-11 PROCEDURE — 84466 ASSAY OF TRANSFERRIN: CPT | Performed by: INTERNAL MEDICINE

## 2024-10-11 RX ORDER — DULAGLUTIDE 0.75 MG/.5ML
0.75 INJECTION, SOLUTION SUBCUTANEOUS
COMMUNITY
Start: 2024-08-28

## 2024-10-23 ENCOUNTER — TELEPHONE (OUTPATIENT)
Dept: ONCOLOGY | Facility: CLINIC | Age: 54
End: 2024-10-23
Payer: COMMERCIAL

## 2024-10-23 NOTE — TELEPHONE ENCOUNTER
Left detailed voicemail letting patient know that she has nothing scheduled with our office on that day and to call back with any questions

## 2024-10-23 NOTE — TELEPHONE ENCOUNTER
Caller: Rosita Siegel    Relationship: Self    Best call back number: [phone]    Who are you requesting to speak with (clinical staff, provider,  specific staff member): scheduling    Do you know the name of the person who called: patient    What was the call regarding: pt would like reschedule 10/25's appt for the week of 10/28 in the afternoon

## 2024-10-25 ENCOUNTER — HOSPITAL ENCOUNTER (OUTPATIENT)
Dept: CARDIOLOGY | Facility: HOSPITAL | Age: 54
Discharge: HOME | End: 2024-10-25
Payer: COMMERCIAL

## 2024-10-25 ENCOUNTER — TELEPHONE (OUTPATIENT)
Dept: ONCOLOGY | Facility: CLINIC | Age: 54
End: 2024-10-25
Payer: COMMERCIAL

## 2024-10-25 ENCOUNTER — HOSPITAL ENCOUNTER (OUTPATIENT)
Dept: ULTRASOUND IMAGING | Facility: HOSPITAL | Age: 54
Discharge: HOME | End: 2024-10-25
Payer: COMMERCIAL

## 2024-10-25 DIAGNOSIS — N18.32 CHRONIC KIDNEY DISEASE (CKD) STAGE G3B/A3, MODERATELY DECREASED GLOMERULAR FILTRATION RATE (GFR) BETWEEN 30-44 ML/MIN/1.73 SQUARE METER AND ALBUMINURIA CREATININE RATIO GREATER THAN 300 MG/G (C*: ICD-10-CM

## 2024-10-25 DIAGNOSIS — N18.2 HYPERTENSIVE KIDNEY DISEASE WITH CHRONIC KIDNEY DISEASE STAGE II: ICD-10-CM

## 2024-10-25 DIAGNOSIS — I12.9 HYPERTENSIVE KIDNEY DISEASE WITH CHRONIC KIDNEY DISEASE STAGE II: ICD-10-CM

## 2024-10-25 LAB
BH CV ECHO MEAS - DIST REN A EDV LEFT: 18.6 CM/S
BH CV ECHO MEAS - DIST REN A PSV LEFT: 66.1 CM/S
BH CV ECHO MEAS - MID REN A EDV LEFT: 17.2 CM/S
BH CV ECHO MEAS - MID REN A PSV LEFT: 71.9 CM/S
BH CV ECHO MEAS - PROX REN A EDV LEFT: 20.9 CM/S
BH CV ECHO MEAS - PROX REN A PSV LEFT: 95.9 CM/S
BH CV VAS RENAL AORTIC MID PSV: 89.5 CM/S
BH CV VAS RENAL HILUM LEFT EDV: 16.5 CM/S
BH CV VAS RENAL HILUM LEFT PSV: 66.8 CM/S
BH CV VAS RENAL HILUM RIGHT EDV: 23.3 CM/S
BH CV VAS RENAL HILUM RIGHT PSV: 79.9 CM/S
BH CV XLRA MEAS - KID L LEFT: 11 CM
BH CV XLRA MEAS DIST REN A EDV RIGHT: 20.6 CM/S
BH CV XLRA MEAS DIST REN A PSV RIGHT: 66.4 CM/S
BH CV XLRA MEAS KID L RIGHT: 12.6 CM
BH CV XLRA MEAS KID W RIGHT: 5.9 CM
BH CV XLRA MEAS MID REN A EDV RIGHT: 21.5 CM/S
BH CV XLRA MEAS MID REN A PSV RIGHT: 95.2 CM/S
BH CV XLRA MEAS PROX REN A EDV RIGHT: 30.7 CM/S
BH CV XLRA MEAS PROX REN A PSV RIGHT: 137 CM/S
BH CV XLRA MEAS RAR LEFT: 1.07
BH CV XLRA MEAS RAR RIGHT: 1.53
BH CV XLRA MEAS RENAL A ORG EDV LEFT: 11.5 CM/S
BH CV XLRA MEAS RENAL A ORG PSV LEFT: 49.8 CM/S
LEFT KIDNEY WIDTH: 5.5 CM
LEFT RENAL UPPER PARENCHYMA MAX: 25.1 CM/S
LEFT RENAL UPPER PARENCHYMA MIN: 7.57 CM/S
LEFT RENAL UPPER PARENCHYMA RI: 0.7
RIGHT RENAL UPPER PARENCHYMA MAX: 15.2 CM/S
RIGHT RENAL UPPER PARENCHYMA MIN: 4.85 CM/S
RIGHT RENAL UPPER PARENCHYMA RI: 0.68

## 2024-10-25 PROCEDURE — 76775 US EXAM ABDO BACK WALL LIM: CPT

## 2024-10-25 PROCEDURE — 93975 VASCULAR STUDY: CPT

## 2024-10-25 NOTE — TELEPHONE ENCOUNTER
Caller: Rosita Siegel    Relationship: Self    Best call back number: [phone]    Who are you requesting to speak with (clinical staff, provider,  specific staff member): scheduling    Do you know the name of the person who called: patient    What was the call regarding: pt lvm advising day/time doesn't matter to reschedule her infusion appt

## 2024-10-29 ENCOUNTER — HOSPITAL ENCOUNTER (OUTPATIENT)
Dept: ONCOLOGY | Facility: HOSPITAL | Age: 54
Discharge: HOME | End: 2024-10-29
Admitting: INTERNAL MEDICINE
Payer: COMMERCIAL

## 2024-10-29 VITALS
OXYGEN SATURATION: 95 % | TEMPERATURE: 97.3 F | DIASTOLIC BLOOD PRESSURE: 82 MMHG | WEIGHT: 228 LBS | SYSTOLIC BLOOD PRESSURE: 130 MMHG | BODY MASS INDEX: 35.79 KG/M2 | RESPIRATION RATE: 14 BRPM | HEART RATE: 67 BPM | HEIGHT: 67 IN

## 2024-10-29 DIAGNOSIS — D50.9 IRON DEFICIENCY ANEMIA, UNSPECIFIED IRON DEFICIENCY ANEMIA TYPE: ICD-10-CM

## 2024-10-29 DIAGNOSIS — T45.4X5A ADVERSE EFFECT OF IRON, INITIAL ENCOUNTER: Primary | ICD-10-CM

## 2024-10-29 PROCEDURE — 25010000002 FERRIC CARBOXYMALTOSE 750 MG/15ML SOLUTION 15 ML VIAL: Performed by: INTERNAL MEDICINE

## 2024-10-29 PROCEDURE — 96365 THER/PROPH/DIAG IV INF INIT: CPT

## 2024-10-29 RX ORDER — SODIUM CHLORIDE 9 MG/ML
20 INJECTION, SOLUTION INTRAVENOUS ONCE
Status: DISCONTINUED | OUTPATIENT
Start: 2024-10-29 | End: 2024-10-30 | Stop reason: HOSPADM

## 2024-10-29 RX ADMIN — FERRIC CARBOXYMALTOSE INJECTION 750 MG: 50 INJECTION, SOLUTION INTRAVENOUS at 09:19

## 2024-10-29 NOTE — PROGRESS NOTES
Patient here for injectafer. Patient stated she did fine with the injectafer in the past. PIV started with positive blood return noted. Patient tolerated treatment and opted not to stay the 30 min observation time. VS WDL. Patient discharged.

## 2024-11-08 ENCOUNTER — TELEPHONE (OUTPATIENT)
Dept: ONCOLOGY | Facility: CLINIC | Age: 54
End: 2024-11-08

## 2024-11-08 NOTE — TELEPHONE ENCOUNTER
Caller: Rosita Siegel    Relationship: Self    Best call back number: [phone]    Who are you requesting to speak with (clinical staff, provider,  specific staff member): scheduling    Do you know the name of the person who called: patient    What was the call regarding: pt needs to reschedule 11/08's appt due to illness

## 2024-11-15 ENCOUNTER — TELEPHONE (OUTPATIENT)
Dept: ONCOLOGY | Facility: CLINIC | Age: 54
End: 2024-11-15

## 2024-11-15 NOTE — TELEPHONE ENCOUNTER
Caller: Rosita Siegel    Relationship: Self    Best call back number: [phone]    Who are you requesting to speak with (clinical staff, provider,  specific staff member): scheduling    Do you know the name of the person who called: patient    What was the call regarding: pt needs to reschedule 11/15's appt, however, she'll call to reschedule the appt

## 2024-12-18 ENCOUNTER — TELEPHONE (OUTPATIENT)
Dept: ONCOLOGY | Facility: CLINIC | Age: 54
End: 2024-12-18

## 2024-12-18 NOTE — TELEPHONE ENCOUNTER
Caller: Rosita Siegel    Relationship: Self    Best call back number: [phone]    Who are you requesting to speak with (clinical staff, provider,  specific staff member): scheduling    Do you know the name of the person who called: patient    What was the call regarding: pt needs to reschedule 12/18's appts

## 2024-12-20 ENCOUNTER — TELEPHONE (OUTPATIENT)
Dept: ONCOLOGY | Facility: CLINIC | Age: 54
End: 2024-12-20
Payer: COMMERCIAL

## 2025-01-22 ENCOUNTER — TELEPHONE (OUTPATIENT)
Dept: CARDIOLOGY | Facility: CLINIC | Age: 55
End: 2025-01-22
Payer: COMMERCIAL

## 2025-01-22 PROBLEM — U07.1 COVID-19: Status: RESOLVED | Noted: 2021-01-22 | Resolved: 2025-01-22

## 2025-01-22 PROBLEM — N17.9 ACUTE RENAL FAILURE: Status: RESOLVED | Noted: 2017-09-05 | Resolved: 2025-01-22

## 2025-01-22 PROBLEM — I10 PRIMARY HYPERTENSION: Chronic | Status: ACTIVE | Noted: 2019-09-04

## 2025-01-22 PROBLEM — A09 DIARRHEA OF INFECTIOUS ORIGIN: Status: RESOLVED | Noted: 2021-01-22 | Resolved: 2025-01-22

## 2025-01-22 PROBLEM — E11.40 TYPE 2 DIABETES MELLITUS WITH DIABETIC NEUROPATHY, WITH LONG-TERM CURRENT USE OF INSULIN: Chronic | Status: ACTIVE | Noted: 2018-05-02

## 2025-01-22 PROBLEM — E11.9 TYPE 2 DIABETES MELLITUS WITHOUT COMPLICATION, WITH LONG-TERM CURRENT USE OF INSULIN: Status: RESOLVED | Noted: 2022-02-16 | Resolved: 2025-01-22

## 2025-01-22 PROBLEM — R30.0 DYSURIA: Status: RESOLVED | Noted: 2022-02-16 | Resolved: 2025-01-22

## 2025-01-22 PROBLEM — R10.9 ACUTE ABDOMINAL PAIN: Status: RESOLVED | Noted: 2021-07-22 | Resolved: 2025-01-22

## 2025-01-22 PROBLEM — Z00.00 PREVENTATIVE HEALTH CARE: Status: RESOLVED | Noted: 2022-02-16 | Resolved: 2025-01-22

## 2025-01-22 PROBLEM — N18.32 CHRONIC RENAL IMPAIRMENT, STAGE 3B: Chronic | Status: ACTIVE | Noted: 2018-07-20

## 2025-01-22 PROBLEM — Z79.4 TYPE 2 DIABETES MELLITUS WITH DIABETIC NEUROPATHY, WITH LONG-TERM CURRENT USE OF INSULIN: Chronic | Status: ACTIVE | Noted: 2018-05-02

## 2025-01-22 PROBLEM — Z79.4 TYPE 2 DIABETES MELLITUS WITHOUT COMPLICATION, WITH LONG-TERM CURRENT USE OF INSULIN: Status: RESOLVED | Noted: 2022-02-16 | Resolved: 2025-01-22

## 2025-01-22 PROBLEM — E66.9 OBESITY (BMI 30-39.9): Status: ACTIVE | Noted: 2025-01-22

## 2025-01-22 NOTE — TELEPHONE ENCOUNTER
CALLED PATIENT. I DID OFFER APPT TODAY WITH DR. JENSEN AND SHE WAS NOT ABLE TO COME IN TODAY. MADE APPT 1/23 WITH MIKY.

## 2025-01-22 NOTE — TELEPHONE ENCOUNTER
Caller: Rosita Siegel    Relationship to patient: Self    Best call back number: [phone]     Chief complaint: KIDNEY FAILURE     Additional notes: PT CALLING TO [facility], SHE WAS SICK FOR LAST APPT AND HAD TO CANCEL - SHE IS KIDNEY FAILURE, STAGE 4, AND HER LIVER ENZYMES ARE EXTREMELY HIGH - PT IS UNABLE TO GET ANY MEDICATIONS PRESCRIBED UNTIL PT IS ABLE TO SEE CARDIO TO CONFIRM WHAT MEDICATIONS SHE IS SAFE TO TAKE - SHE IS ASKING IF POSSIBLE TO SCHEDULE BEFORE 03.18.25 AVAILABILITY SHOWING FOR HUB -